# Patient Record
Sex: FEMALE | Race: WHITE | NOT HISPANIC OR LATINO | Employment: OTHER | URBAN - NONMETROPOLITAN AREA
[De-identification: names, ages, dates, MRNs, and addresses within clinical notes are randomized per-mention and may not be internally consistent; named-entity substitution may affect disease eponyms.]

---

## 2017-01-09 ENCOUNTER — OFFICE VISIT (OUTPATIENT)
Dept: INTERNAL MEDICINE | Facility: CLINIC | Age: 67
End: 2017-01-09

## 2017-01-09 VITALS
DIASTOLIC BLOOD PRESSURE: 68 MMHG | BODY MASS INDEX: 32.18 KG/M2 | SYSTOLIC BLOOD PRESSURE: 160 MMHG | TEMPERATURE: 97.6 F | HEART RATE: 52 BPM | HEIGHT: 67 IN | WEIGHT: 205 LBS | OXYGEN SATURATION: 98 %

## 2017-01-09 DIAGNOSIS — F41.9 ANXIETY: ICD-10-CM

## 2017-01-09 DIAGNOSIS — I10 ESSENTIAL HYPERTENSION: Primary | ICD-10-CM

## 2017-01-09 DIAGNOSIS — E78.5 HYPERLIPIDEMIA, UNSPECIFIED HYPERLIPIDEMIA TYPE: ICD-10-CM

## 2017-01-09 DIAGNOSIS — I48.0 PAROXYSMAL ATRIAL FIBRILLATION (HCC): ICD-10-CM

## 2017-01-09 DIAGNOSIS — F01.50 VASCULAR DEMENTIA WITHOUT BEHAVIORAL DISTURBANCE (HCC): ICD-10-CM

## 2017-01-09 DIAGNOSIS — R05.9 COUGH: ICD-10-CM

## 2017-01-09 PROCEDURE — 99213 OFFICE O/P EST LOW 20 MIN: CPT | Performed by: INTERNAL MEDICINE

## 2017-01-09 PROCEDURE — G0009 ADMIN PNEUMOCOCCAL VACCINE: HCPCS | Performed by: INTERNAL MEDICINE

## 2017-01-09 PROCEDURE — 90670 PCV13 VACCINE IM: CPT | Performed by: INTERNAL MEDICINE

## 2017-01-09 RX ORDER — AMLODIPINE BESYLATE 5 MG/1
5 TABLET ORAL DAILY
Qty: 30 TABLET | Refills: 6 | Status: SHIPPED | OUTPATIENT
Start: 2017-01-09 | End: 2017-01-10

## 2017-01-09 NOTE — MR AVS SNAPSHOT
Nery Garcia   1/9/2017 8:45 AM   Office Visit    Provider:  Marilyn K Vermeesch, MD   Department:  [facility] PRIMARY CARE   Dept Phone:  [phone]                Your Full Care Plan              Today's Medication Changes          These changes are accurate as of: 1/9/17  9:21 AM.  If you have any questions, ask your nurse or doctor.               New Medication(s)Ordered:     amLODIPine 5 MG tablet   Commonly known as:  NORVASC   Take 1 tablet by mouth Daily.            Where to Get Your Medications      These medications were sent to [facility] [address] [place] [address] - [address] [place] [address] - [phone]  - [phone]   [address] [place] [id number]     Phone:  [phone]     amLODIPine 5 MG tablet                  Your Updated Medication List          This list is accurate as of: 1/9/17  9:21 AM.  Always use your most recent med list.                Acidophilus wafer       amLODIPine 5 MG tablet   Commonly known as:  NORVASC   Take 1 tablet by mouth Daily.       ANORO ELLIPTA 62.5-25 MCG/INH aerosol powder    Generic drug:  Umeclidinium-Vilanterol       aspirin 81 MG tablet       atorvastatin 40 MG tablet   Commonly known as:  LIPITOR   Take 1 tablet by mouth Daily.       carbidopa-levodopa  MG per tablet   Commonly known as:  SINEMET       donepezil 10 MG tablet   Commonly known as:  ARICEPT   Take 1 tablet by mouth every night.       escitalopram 20 MG tablet   Commonly known as:  LEXAPRO       FIBER PO       fluticasone 50 MCG/ACT nasal spray   Commonly known as:  FLONASE       losartan 100 MG tablet   Commonly known as:  COZAAR   Take 1 tablet by mouth Daily.       montelukast 10 MG tablet   Commonly known as:  SINGULAIR   Take 1 tablet by mouth Daily.       MULTI-DAY VITAMINS tablet       OSCAL 500/200 D-3 500-200 MG-UNIT per tablet   Generic drug:  calcium-vitamin D       pantoprazole 40 MG EC tablet   Commonly known as:  PROTONIX    TAKE ONE TABLET BY MOUTH IN THE MORNING 1/2  HOUR  BEFORE  BREAKFAST       PRADAXA 150 MG capsu   Generic drug:  dabigatran etexilate       sotalol 80 MG tablet   Commonly known as:  BETAPACE   Take 1 tablet by mouth 2 (Two) Times a Day.       vitamin B-12 1000 MCG tablet   Commonly known as:  CYANOCOBALAMIN       Vitamin E 400 UNITS tablet               You Were Diagnosed With        Codes Comments    Essential hypertension    -  Primary ICD-10-CM: I10  ICD-9-CM: 401.9     Hyperlipidemia, unspecified hyperlipidemia type     ICD-10-CM: E78.5  ICD-9-CM: 272.4     Vascular dementia without behavioral disturbance     ICD-10-CM: F01.50  ICD-9-CM: 290.40     Anxiety     ICD-10-CM: F41.9  ICD-9-CM: 300.00     Paroxysmal atrial fibrillation     ICD-10-CM: I48.0  ICD-9-CM: 427.31     Cough     ICD-10-CM: R05  ICD-9-CM: 786.2       Instructions     None    Patient Instructions History      [employer] Signup     Our records indicate that you have an active [employer] account.    You can view your After Visit Summary by going to [employer] and logging in with your [employer] username and password.  If you don't have a [employer] username and password but a parent or guardian has access to your record, the parent or guardian should login with their own [employer] username and password and access your record to view the After Visit Summary.    If you have questions, you can email [email or web address] or call [phone] to talk to our [employer] staff.  Remember, [employer] is NOT to be used for urgent needs.  For medical emergencies, dial 911.               Other Info from Your Visit           Your Appointments     Sep 20, 2017 11:45 AM EDT   Follow Up with Mega Kendall MD   [facility] (--)    [address] [place] [id number] [phone]           Arrive 15 minutes prior to appointment.              Allergies     Lisinopril  Cough      Reason for  "Visit     Follow-up 1 month for HTN, GERD, Dementia, and Depression. Pt states she's been coughing up dark green mucus X 1 week, states she had one day where she lost her voice.       Vital Signs     Blood Pressure Pulse Temperature Height Weight Oxygen Saturation    160/68 52 97.6 °F (36.4 °C) 67\" (170.2 cm) 205 lb (93 kg) 98%    Body Mass Index Smoking Status                32.11 kg/m2 Former Smoker          Problems and Diagnoses Noted     Anxiety problem    Cough    Dementia    High cholesterol or triglycerides    High blood pressure    Atrial fibrillation (irregular heartbeat)      No Longer an Issue     Cognitive disorder    Dyslipidemia    Encounter for screening mammogram for breast cancer    Heartburn    Malignant essential hypertension    Periodic limb movement disorder      "

## 2017-01-09 NOTE — PROGRESS NOTES
"Chief Complaint   Patient presents with   • Follow-up     1 month for HTN, GERD, Dementia, and Depression. Pt states she's been coughing up dark green mucus X 1 week, states she had one day where she lost her voice.      Subjective   Novel Azael Garcia is a 66 y.o. female.     HPI Comments: PMH of HTN, HLD, parox. Afib, depression, PAT, dementia, history of PE, fatty liver disease, COPD and GERD.   GERD is well controlled. No abdominal pain, dark stools or blood in stool.   She is now enrolled in a study at  for dementia.  She remains on aricept and Sinemet.  She is on Lexapro for depression symptoms.  She has had a recent cough with dark green mucus for the past week.  Denies SOB or wheezing. She is on oxygen at night for PAT.  BP is elevated, home reads are running 152-177/56-69.  No CP, SOB or edema.   No EA, ST, fever.  Always has PND.           The following portions of the patient's history were reviewed and updated as appropriate: allergies, current medications, past family history, past medical history, past social history, past surgical history and problem list.    Review of Systems   Constitutional: Negative for chills and fever.   HENT: Positive for congestion and postnasal drip. Negative for ear pain and sore throat.    Respiratory: Positive for cough. Negative for shortness of breath.    Cardiovascular: Negative for chest pain and palpitations.   Psychiatric/Behavioral: Positive for confusion and dysphoric mood. The patient is not nervous/anxious.    All other systems reviewed and are negative.      Objective   Visit Vitals   • /68   • Pulse 52   • Temp 97.6 °F (36.4 °C)   • Ht 67\" (170.2 cm)   • Wt 205 lb (93 kg)   • SpO2 98%   • BMI 32.11 kg/m2     Body mass index is 32.11 kg/(m^2).  Physical Exam   Constitutional: She appears well-developed and well-nourished.   HENT:   Head: Normocephalic and atraumatic.   Right Ear: External ear normal.   Left Ear: External ear normal.   Mouth/Throat: " Oropharynx is clear and moist.   Eyes: Conjunctivae and EOM are normal. Pupils are equal, round, and reactive to light.   Neck: Normal range of motion. Neck supple. No thyromegaly present.   Cardiovascular: Normal rate, regular rhythm, normal heart sounds and intact distal pulses.    No murmur heard.  Pulmonary/Chest: Effort normal and breath sounds normal. No respiratory distress. She has no wheezes.   Abdominal: Soft. Bowel sounds are normal.   Musculoskeletal: She exhibits no edema.   Lymphadenopathy:     She has no cervical adenopathy.   Neurological: She is alert. No cranial nerve deficit.   Psychiatric: She has a normal mood and affect. Her behavior is normal. Judgment normal.   Nursing note and vitals reviewed.      Assessment/Plan   Nery Garcia is here today and the following problems have been addressed:      Nery was seen today for follow-up.    Diagnoses and all orders for this visit:    Essential hypertension    Hyperlipidemia, unspecified hyperlipidemia type    Vascular dementia without behavioral disturbance    Anxiety    Paroxysmal atrial fibrillation    Cough    Other orders  -     amLODIPine (NORVASC) 5 MG tablet; Take 1 tablet by mouth Daily.    She is uncertain if taking amlodipine, will call us and let us know, sent in new script for 5 mg tabs  Check BP regularly  Recommend mucinex once a day  Increase fluids and rest  No other med changes  Labs are UTD  Mammogram is UTD    RTC 2 mo for BP followup

## 2017-01-10 ENCOUNTER — TELEPHONE (OUTPATIENT)
Dept: INTERNAL MEDICINE | Facility: CLINIC | Age: 67
End: 2017-01-10

## 2017-01-10 RX ORDER — AMLODIPINE BESYLATE 10 MG/1
10 TABLET ORAL DAILY
Qty: 30 TABLET | Refills: 5 | Status: SHIPPED | OUTPATIENT
Start: 2017-01-10 | End: 2017-03-09 | Stop reason: SDUPTHER

## 2017-01-16 RX ORDER — ESCITALOPRAM OXALATE 10 MG/1
TABLET ORAL
Qty: 30 TABLET | Refills: 0 | Status: SHIPPED | OUTPATIENT
Start: 2017-01-16 | End: 2017-02-18 | Stop reason: SDUPTHER

## 2017-02-20 RX ORDER — ESCITALOPRAM OXALATE 10 MG/1
TABLET ORAL
Qty: 30 TABLET | Refills: 0 | Status: SHIPPED | OUTPATIENT
Start: 2017-02-20 | End: 2017-03-09

## 2017-03-09 ENCOUNTER — OFFICE VISIT (OUTPATIENT)
Dept: INTERNAL MEDICINE | Facility: CLINIC | Age: 67
End: 2017-03-09

## 2017-03-09 VITALS
WEIGHT: 208 LBS | OXYGEN SATURATION: 98 % | TEMPERATURE: 98 F | BODY MASS INDEX: 32.65 KG/M2 | HEART RATE: 53 BPM | SYSTOLIC BLOOD PRESSURE: 120 MMHG | HEIGHT: 67 IN | DIASTOLIC BLOOD PRESSURE: 66 MMHG

## 2017-03-09 DIAGNOSIS — I10 ESSENTIAL HYPERTENSION: ICD-10-CM

## 2017-03-09 DIAGNOSIS — R68.89 COLD FEELING: ICD-10-CM

## 2017-03-09 DIAGNOSIS — Z11.59 NEED FOR HEPATITIS C SCREENING TEST: Primary | ICD-10-CM

## 2017-03-09 PROBLEM — R05.9 COUGH: Status: RESOLVED | Noted: 2017-01-09 | Resolved: 2017-03-09

## 2017-03-09 LAB
BASOPHILS # BLD AUTO: 0.02 10*3/MM3 (ref 0–0.2)
BASOPHILS NFR BLD AUTO: 0.4 % (ref 0–1)
DEPRECATED RDW RBC AUTO: 44.7 FL (ref 37–54)
EOSINOPHIL # BLD AUTO: 0.21 10*3/MM3 (ref 0.1–0.3)
EOSINOPHIL NFR BLD AUTO: 3.8 % (ref 0–3)
ERYTHROCYTE [DISTWIDTH] IN BLOOD BY AUTOMATED COUNT: 12.9 % (ref 11.3–14.5)
HCT VFR BLD AUTO: 39.4 % (ref 34.5–44)
HGB BLD-MCNC: 12.7 G/DL (ref 11.5–15.5)
IMM GRANULOCYTES # BLD: 0 10*3/MM3 (ref 0–0.03)
IMM GRANULOCYTES NFR BLD: 0 % (ref 0–0.6)
LYMPHOCYTES # BLD AUTO: 2.25 10*3/MM3 (ref 0.6–4.8)
LYMPHOCYTES NFR BLD AUTO: 40.5 % (ref 24–44)
MCH RBC QN AUTO: 30.4 PG (ref 27–31)
MCHC RBC AUTO-ENTMCNC: 32.2 G/DL (ref 32–36)
MCV RBC AUTO: 94.3 FL (ref 80–99)
MONOCYTES # BLD AUTO: 0.36 10*3/MM3 (ref 0–1)
MONOCYTES NFR BLD AUTO: 6.5 % (ref 0–12)
NEUTROPHILS # BLD AUTO: 2.71 10*3/MM3 (ref 1.5–8.3)
NEUTROPHILS NFR BLD AUTO: 48.8 % (ref 41–71)
PLATELET # BLD AUTO: 159 10*3/MM3 (ref 150–450)
PMV BLD AUTO: 10.7 FL (ref 6–12)
RBC # BLD AUTO: 4.18 10*6/MM3 (ref 3.89–5.14)
WBC NRBC COR # BLD: 5.55 10*3/MM3 (ref 3.5–10.8)

## 2017-03-09 PROCEDURE — 85025 COMPLETE CBC W/AUTO DIFF WBC: CPT | Performed by: INTERNAL MEDICINE

## 2017-03-09 PROCEDURE — 36415 COLL VENOUS BLD VENIPUNCTURE: CPT | Performed by: INTERNAL MEDICINE

## 2017-03-09 PROCEDURE — 99213 OFFICE O/P EST LOW 20 MIN: CPT | Performed by: INTERNAL MEDICINE

## 2017-03-09 RX ORDER — AMLODIPINE BESYLATE 10 MG/1
10 TABLET ORAL DAILY
Qty: 90 TABLET | Refills: 1 | Status: SHIPPED | OUTPATIENT
Start: 2017-03-09 | End: 2017-06-09 | Stop reason: SDUPTHER

## 2017-03-09 RX ORDER — PANTOPRAZOLE SODIUM 40 MG/1
40 TABLET, DELAYED RELEASE ORAL DAILY
Qty: 90 TABLET | Refills: 1 | Status: SHIPPED | OUTPATIENT
Start: 2017-03-09 | End: 2017-06-09 | Stop reason: SDUPTHER

## 2017-03-09 RX ORDER — SOTALOL HYDROCHLORIDE 80 MG/1
80 TABLET ORAL 2 TIMES DAILY
Qty: 180 TABLET | Refills: 1 | Status: SHIPPED | OUTPATIENT
Start: 2017-03-09 | End: 2017-06-09 | Stop reason: SDUPTHER

## 2017-03-09 RX ORDER — LOSARTAN POTASSIUM 100 MG/1
100 TABLET ORAL DAILY
Qty: 90 TABLET | Refills: 1 | Status: SHIPPED | OUTPATIENT
Start: 2017-03-09 | End: 2017-06-09 | Stop reason: SDUPTHER

## 2017-03-09 RX ORDER — MONTELUKAST SODIUM 10 MG/1
10 TABLET ORAL DAILY
Qty: 90 TABLET | Refills: 1 | Status: SHIPPED | OUTPATIENT
Start: 2017-03-09 | End: 2017-06-09 | Stop reason: SDUPTHER

## 2017-03-09 NOTE — PROGRESS NOTES
"Chief Complaint   Patient presents with   • Follow-up     2 months for BP. Pt states BP seems to be doing better. Also states she is cold all the time.      Subjective   Novel Azael Garcia is a 66 y.o. female.     HPI Comments: Here for follow up of HTN.  BP is well controlled today.  No CP, but she has chronic SOB.  Has some edema at end of day.   She is on several meds for her BP.  She has not been checking it often, but it is usually good when she does.   Is complaining that she is cold when every one else is warm.   All labs were normal 3 months ago.   She does have loose stools and did have one episode of incontinence.   Last night she developed pain over her left upper chest wall.  It hurts with palpation.  She does not notice a nodule.         The following portions of the patient's history were reviewed and updated as appropriate: allergies, current medications, past family history, past medical history, past social history, past surgical history and problem list.    Review of Systems   Respiratory: Positive for shortness of breath.    Cardiovascular: Positive for leg swelling.   Gastrointestinal:        Loose stools but not frequent   Musculoskeletal:        Right upper chest wall pain       Objective   Visit Vitals   • /66   • Pulse 53   • Temp 98 °F (36.7 °C)   • Ht 67\" (170.2 cm)   • Wt 208 lb (94.3 kg)   • SpO2 98%   • BMI 32.58 kg/m2     Body mass index is 32.58 kg/(m^2).  Physical Exam   Constitutional: She is oriented to person, place, and time. She appears well-developed and well-nourished.   HENT:   Head: Normocephalic and atraumatic.   Mouth/Throat: Oropharynx is clear and moist.   Eyes: Conjunctivae and EOM are normal. Pupils are equal, round, and reactive to light.   Neck: Normal range of motion. Neck supple. No thyromegaly present.   Cardiovascular: Normal rate, regular rhythm, normal heart sounds and intact distal pulses.    No murmur heard.  Pulmonary/Chest: Effort normal and breath " sounds normal. No respiratory distress.   Musculoskeletal:   +1 edema at ankles  Right upper chest wall with no palpable mass, some pain to palpation over lateral second and third ribs   Lymphadenopathy:     She has no cervical adenopathy.   Neurological: She is alert and oriented to person, place, and time. No cranial nerve deficit.   Psychiatric: She has a normal mood and affect. Judgment normal.   Nursing note and vitals reviewed.      Assessment/Plan   Nery Garcia is here today and the following problems have been addressed:      Nery was seen today for follow-up.    Diagnoses and all orders for this visit:    Need for hepatitis C screening test    Essential hypertension  -     CBC & Differential; Future    Cold feeling  -     CBC & Differential; Future    Other orders  -     sotalol (BETAPACE) 80 MG tablet; Take 1 tablet by mouth 2 (Two) Times a Day.  -     pantoprazole (PROTONIX) 40 MG EC tablet; Take 1 tablet by mouth Daily.  -     montelukast (SINGULAIR) 10 MG tablet; Take 1 tablet by mouth Daily.  -     losartan (COZAAR) 100 MG tablet; Take 1 tablet by mouth Daily.  -     amLODIPine (NORVASC) 10 MG tablet; Take 1 tablet by mouth Daily.    lab as noted  No med changes  Recommend knee high compression stockings for edema due to amlodipine use  Loose stool likely from aricept  Recommend heat to chest wall and tylenol to area  If chest wall sxs worsen, RTC    RTc 3 mo    Please note that portions of this note were completed with a voice recognition program.  Efforts were made to edit dictation, but occasionally words are mistranscribed.

## 2017-03-10 ENCOUNTER — TELEPHONE (OUTPATIENT)
Dept: INTERNAL MEDICINE | Facility: CLINIC | Age: 67
End: 2017-03-10

## 2017-03-10 NOTE — TELEPHONE ENCOUNTER
----- Message from Marilyn K Vermeesch, MD sent at 3/9/2017  6:00 PM EST -----  Please tell patient or her daughter that CBC is completely normal.

## 2017-03-31 ENCOUNTER — TELEPHONE (OUTPATIENT)
Dept: INTERNAL MEDICINE | Facility: CLINIC | Age: 67
End: 2017-03-31

## 2017-03-31 NOTE — TELEPHONE ENCOUNTER
----- Message from Susan Stoner sent at 3/31/2017  4:03 PM EDT -----  Contact: Patient   Patient called the office stating that she got a letter from her insurance that she is going to have to switch from pradaxa and starts taking another blood thinner. She stated that dr Garcia had been giving her samples of elliquis and she has ran out of the samples. She has tried to get in contact with their office but so far they have not sent anything in. She wants to see if Dr Vermeesch could send her a Rx to [facility] pharmacy here in [place] before the weekend. She is very nervous about going the weekend without it and would like a contact back at [phone] when you get a chance.

## 2017-04-05 ENCOUNTER — TELEPHONE (OUTPATIENT)
Dept: INTERNAL MEDICINE | Facility: CLINIC | Age: 67
End: 2017-04-05

## 2017-04-05 NOTE — TELEPHONE ENCOUNTER
Pharmacy called stating Pt's Eliquis is going to cost her over $300. Dr. Kendall's office is currently out on spring break and the Pt is out of medication. Pharmacy would like to know if they can just fill her Pradaxa.

## 2017-04-11 ENCOUNTER — OFFICE VISIT (OUTPATIENT)
Dept: INTERNAL MEDICINE | Facility: CLINIC | Age: 67
End: 2017-04-11

## 2017-04-11 VITALS
HEART RATE: 57 BPM | BODY MASS INDEX: 32.55 KG/M2 | WEIGHT: 207.38 LBS | HEIGHT: 67 IN | OXYGEN SATURATION: 98 % | SYSTOLIC BLOOD PRESSURE: 120 MMHG | DIASTOLIC BLOOD PRESSURE: 62 MMHG | TEMPERATURE: 97.4 F

## 2017-04-11 DIAGNOSIS — H66.001 ACUTE SUPPURATIVE OTITIS MEDIA OF RIGHT EAR WITHOUT SPONTANEOUS RUPTURE OF TYMPANIC MEMBRANE, RECURRENCE NOT SPECIFIED: Primary | ICD-10-CM

## 2017-04-11 PROBLEM — R68.89 COLD FEELING: Status: RESOLVED | Noted: 2017-03-09 | Resolved: 2017-04-11

## 2017-04-11 PROCEDURE — 99213 OFFICE O/P EST LOW 20 MIN: CPT | Performed by: INTERNAL MEDICINE

## 2017-04-11 RX ORDER — AMOXICILLIN 875 MG/1
875 TABLET, COATED ORAL 2 TIMES DAILY
Qty: 20 TABLET | Refills: 0 | Status: SHIPPED | OUTPATIENT
Start: 2017-04-11 | End: 2017-06-09

## 2017-04-11 NOTE — PROGRESS NOTES
"Chief Complaint   Patient presents with   • Cough     right ear draining, SOA, chest congestion, and brownish-green mucus X 3 days now.      Subjective   Novel Azael Garcia is a 66 y.o. female.     HPI Comments: She is on a study for her dementia and is currently inhaling insulin in nose, thru [facility] study.     Cough   This is a new problem. The current episode started in the past 7 days. The problem has been waxing and waning. The problem occurs every few hours. The cough is productive of sputum (green /brown mucous). Associated symptoms include ear congestion, nasal congestion, rhinorrhea and shortness of breath. Pertinent negatives include no chills, ear pain, fever, headaches, hemoptysis, postnasal drip, sore throat, sweats or wheezing. Associated symptoms comments: Poor energy  Right ear fullness and drainage  Head feels full. The symptoms are aggravated by pollens. She has tried nothing for the symptoms. Her past medical history is significant for COPD.        The following portions of the patient's history were reviewed and updated as appropriate: allergies, current medications, past family history, past medical history, past social history, past surgical history and problem list.    Review of Systems   Constitutional: Negative for chills and fever.   HENT: Positive for rhinorrhea. Negative for ear pain, postnasal drip and sore throat.    Respiratory: Positive for cough and shortness of breath. Negative for hemoptysis and wheezing.    Neurological: Negative for headaches.       Objective   /62  Pulse 57  Temp 97.4 °F (36.3 °C)  Ht 67\" (170.2 cm)  Wt 207 lb 6 oz (94.1 kg)  SpO2 98%  BMI 32.48 kg/m2  Body mass index is 32.48 kg/(m^2).  Physical Exam   Constitutional: She is oriented to person, place, and time. She appears well-developed and well-nourished.   HENT:   Head: Normocephalic and atraumatic.   Mouth/Throat: Oropharynx is clear and moist.   Left tympanic membrane is normal, right tympanic " membrane is ruptured and erythematous with small amount of clear drainage, turbinates are slightly red with clear rhinorrhea, clear postnasal drip noted, right sinus tenderness over maxillary area   Eyes: Conjunctivae and EOM are normal. Pupils are equal, round, and reactive to light.   Neck: Normal range of motion. Neck supple. No thyromegaly present.   Cardiovascular: Normal rate, regular rhythm and normal heart sounds.    No murmur heard.  Pulmonary/Chest: Effort normal and breath sounds normal. No respiratory distress. She has no wheezes. She has no rales.   Lymphadenopathy:     She has no cervical adenopathy.   Neurological: She is alert and oriented to person, place, and time. No cranial nerve deficit.   Psychiatric: She has a normal mood and affect. Judgment normal.   Nursing note and vitals reviewed.      Assessment/Plan   Nery Garcia is here today and the following problems have been addressed:      Nery was seen today for cough.    Diagnoses and all orders for this visit:    Acute suppurative otitis media of right ear without spontaneous rupture of tympanic membrane, recurrence not specified    Other orders  -     amoxicillin (AMOXIL) 875 MG tablet; Take 1 tablet by mouth 2 (Two) Times a Day.    Given amoxil 875 mg BID  Recommend tylenol prn for pain  May apply heat to ear prn  If needed, use plain mucinex expectorant    RTC prn    Please note that portions of this note were completed with a voice recognition program.  Efforts were made to edit dictation, but occasionally words are mistranscribed.

## 2017-04-26 ENCOUNTER — OFFICE VISIT (OUTPATIENT)
Dept: INTERNAL MEDICINE | Facility: CLINIC | Age: 67
End: 2017-04-26

## 2017-04-26 ENCOUNTER — TELEPHONE (OUTPATIENT)
Dept: INTERNAL MEDICINE | Facility: CLINIC | Age: 67
End: 2017-04-26

## 2017-04-26 VITALS
BODY MASS INDEX: 32.8 KG/M2 | OXYGEN SATURATION: 97 % | DIASTOLIC BLOOD PRESSURE: 62 MMHG | SYSTOLIC BLOOD PRESSURE: 132 MMHG | TEMPERATURE: 97.1 F | HEART RATE: 89 BPM | WEIGHT: 209 LBS | HEIGHT: 67 IN

## 2017-04-26 DIAGNOSIS — R19.7 DIARRHEA OF PRESUMED INFECTIOUS ORIGIN: Primary | ICD-10-CM

## 2017-04-26 DIAGNOSIS — R53.82 CHRONIC FATIGUE: ICD-10-CM

## 2017-04-26 LAB
ALBUMIN SERPL-MCNC: 4.5 G/DL (ref 3.5–5)
ALBUMIN/GLOB SERPL: 1.7 G/DL (ref 1–2)
ALP SERPL-CCNC: 74 U/L (ref 38–126)
ALT SERPL-CCNC: 33 U/L (ref 13–69)
AST SERPL-CCNC: 27 U/L (ref 15–46)
BASOPHILS # BLD AUTO: 0.06 10*3/MM3 (ref 0–0.2)
BASOPHILS NFR BLD AUTO: 1.1 % (ref 0–2.5)
BILIRUB SERPL-MCNC: 0.8 MG/DL (ref 0.2–1.3)
BUN SERPL-MCNC: 14 MG/DL (ref 7–20)
BUN/CREAT SERPL: 11.7 (ref 7.1–23.5)
CALCIUM SERPL-MCNC: 9.8 MG/DL (ref 8.4–10.2)
CHLORIDE SERPL-SCNC: 103 MMOL/L (ref 98–107)
CO2 SERPL-SCNC: 28 MMOL/L (ref 26–30)
CREAT SERPL-MCNC: 1.2 MG/DL (ref 0.6–1.3)
EOSINOPHIL # BLD AUTO: 0.19 10*3/MM3 (ref 0–0.7)
EOSINOPHIL NFR BLD AUTO: 3.4 % (ref 0–7)
ERYTHROCYTE [DISTWIDTH] IN BLOOD BY AUTOMATED COUNT: 12.3 % (ref 11.5–14.5)
GLOBULIN SER CALC-MCNC: 2.6 GM/DL
GLUCOSE SERPL-MCNC: 94 MG/DL (ref 74–98)
HCT VFR BLD AUTO: 40.2 % (ref 37–47)
HGB BLD-MCNC: 13.1 G/DL (ref 12–16)
IMM GRANULOCYTES # BLD: 0.01 10*3/MM3 (ref 0–0.06)
IMM GRANULOCYTES NFR BLD: 0.2 % (ref 0–0.6)
LYMPHOCYTES # BLD AUTO: 2.19 10*3/MM3 (ref 0.6–3.4)
LYMPHOCYTES NFR BLD AUTO: 39 % (ref 10–50)
MCH RBC QN AUTO: 30.8 PG (ref 27–31)
MCHC RBC AUTO-ENTMCNC: 32.6 G/DL (ref 30–37)
MCV RBC AUTO: 94.4 FL (ref 81–99)
MONOCYTES # BLD AUTO: 0.41 10*3/MM3 (ref 0–0.9)
MONOCYTES NFR BLD AUTO: 7.3 % (ref 0–12)
NEUTROPHILS # BLD AUTO: 2.76 10*3/MM3 (ref 2–6.9)
NEUTROPHILS NFR BLD AUTO: 49 % (ref 37–80)
NRBC BLD AUTO-RTO: 0 /100 WBC (ref 0–0)
PLATELET # BLD AUTO: 175 10*3/MM3 (ref 130–400)
POTASSIUM SERPL-SCNC: 4.6 MMOL/L (ref 3.5–5.1)
PROT SERPL-MCNC: 7.1 G/DL (ref 6.3–8.2)
RBC # BLD AUTO: 4.26 10*6/MM3 (ref 4.2–5.4)
SODIUM SERPL-SCNC: 142 MMOL/L (ref 137–145)
WBC # BLD AUTO: 5.62 10*3/MM3 (ref 4.8–10.8)

## 2017-04-26 PROCEDURE — 99214 OFFICE O/P EST MOD 30 MIN: CPT | Performed by: FAMILY MEDICINE

## 2017-04-26 RX ORDER — BLACK COHOSH ROOT EXTRACT 80 MG
1 CAPSULE ORAL 3 TIMES DAILY
Qty: 30 CAPSULE | Refills: 0 | Status: SHIPPED | OUTPATIENT
Start: 2017-04-26 | End: 2018-08-29

## 2017-04-26 NOTE — TELEPHONE ENCOUNTER
----- Message from Reva Gilmore sent at 4/26/2017 10:26 AM EDT -----  Contact: Patient  Patient said for the past month after she eats she gets diarrhea.  She wants to know if there is anything that can be called in for her for this problem.    She'd like a callback at:  [phone]

## 2017-04-26 NOTE — PROGRESS NOTES
"Subjective   Novel Azael Garcia is a 66 y.o. female.     History of Present Illness   Having extreme fatigue. Has also been having extreme diarrhea. Runny dark BMs, stringy, after eating, urgency to go, has had some BMs on herself. Stools are dark brown. No blood. No black. No abdominal pain. Has been having diarrhea for a week, not improving. Feels so tired. She has sleep apnea per chart (she's not sure when I ask), wears \"oxygen\" from a big machine. Has seen Dr. Ramirez in the past but it's been a long time. Settings have not been changed on machine since she started using it.  Plans to go to [place] this weekend and wants to make sure she's good to go. Has city water. Was on antibiotics earlier this month (amoxicillin). Takes Protonix.    The following portions of the patient's history were reviewed and updated as appropriate: allergies, current medications, past family history, past medical history, past social history, past surgical history and problem list.    Review of Systems   Constitutional: Positive for activity change. Negative for fever.   Gastrointestinal: Negative for blood in stool.       Objective   Physical Exam   Constitutional: Vital signs are normal. She appears well-developed and well-nourished. She is active. She does not appear ill.   HENT:   Head: Normocephalic and atraumatic.   Right Ear: Hearing normal.   Left Ear: Hearing normal.   Eyes: EOM are normal. Pupils are equal, round, and reactive to light.   Neck: Neck supple.   Cardiovascular: Normal rate, regular rhythm and normal heart sounds.    Pulmonary/Chest: Effort normal and breath sounds normal.   Abdominal: Soft. Bowel sounds are normal. She exhibits no mass. There is no tenderness. There is no rigidity, no rebound and no guarding.   Exam delayed due to phone ringing after patient on exam table, she got down and answered and carried on a conversation with granddaughter for few minutes.   Neurological: She is alert. Gait normal. "   Skin: Skin is warm. She is not diaphoretic. No cyanosis. No pallor.   Psychiatric: She has a normal mood and affect. Her speech is normal and behavior is normal. Judgment normal.   Nursing note and vitals reviewed.    Lab Results   Component Value Date    TSH 3.604 12/07/2016     CMP 12/7/16 CO2 34.0  eGFR 72, Cr 0.8  CBC 3/9/17 not worrisome    Assessment/Plan   Novel was seen today for diarrhea and fatigue.    Diagnoses and all orders for this visit:    Diarrhea of presumed infectious origin  -     Fecal Leukocytes  -     Occult Blood X 1, Stool  -     Stool Culture  -     Clostridium Difficile EIA  -     Lactobacillus (ACIDOPHILUS PROBIOTIC) 100 MG capsule; Take 1 capsule by mouth 3 (Three) Times a Day.    Chronic fatigue  -     CBC & Differential  -     Comprehensive Metabolic Panel    Encouraged hydration, testing for infectious origin, discouraged use of pepto/immodium in case it is infectious. Needs to follow up with PCP. May have diarrhea associated with PPI use (~9% adverse event rate). Must rule out C diff. Encouraged CPAP compliance, needs to follow up with Dr. Ramirez to see if settings need changed.

## 2017-05-26 ENCOUNTER — TELEPHONE (OUTPATIENT)
Dept: INTERNAL MEDICINE | Facility: CLINIC | Age: 67
End: 2017-05-26

## 2017-06-09 ENCOUNTER — OFFICE VISIT (OUTPATIENT)
Dept: INTERNAL MEDICINE | Facility: CLINIC | Age: 67
End: 2017-06-09

## 2017-06-09 VITALS
HEIGHT: 67 IN | SYSTOLIC BLOOD PRESSURE: 116 MMHG | OXYGEN SATURATION: 96 % | BODY MASS INDEX: 32.57 KG/M2 | HEART RATE: 70 BPM | DIASTOLIC BLOOD PRESSURE: 58 MMHG | TEMPERATURE: 97.7 F | WEIGHT: 207.5 LBS

## 2017-06-09 DIAGNOSIS — E78.2 MIXED HYPERLIPIDEMIA: ICD-10-CM

## 2017-06-09 DIAGNOSIS — F32.89 OTHER DEPRESSION: ICD-10-CM

## 2017-06-09 DIAGNOSIS — K21.9 GASTROESOPHAGEAL REFLUX DISEASE, ESOPHAGITIS PRESENCE NOT SPECIFIED: ICD-10-CM

## 2017-06-09 DIAGNOSIS — I10 ESSENTIAL HYPERTENSION: Primary | ICD-10-CM

## 2017-06-09 DIAGNOSIS — F03.90 DEMENTIA WITHOUT BEHAVIORAL DISTURBANCE, UNSPECIFIED DEMENTIA TYPE: ICD-10-CM

## 2017-06-09 DIAGNOSIS — J43.8 OTHER EMPHYSEMA (HCC): ICD-10-CM

## 2017-06-09 PROCEDURE — 99214 OFFICE O/P EST MOD 30 MIN: CPT | Performed by: INTERNAL MEDICINE

## 2017-06-09 RX ORDER — PANTOPRAZOLE SODIUM 40 MG/1
40 TABLET, DELAYED RELEASE ORAL DAILY
Qty: 90 TABLET | Refills: 3 | Status: SHIPPED | OUTPATIENT
Start: 2017-06-09 | End: 2018-04-06 | Stop reason: SDUPTHER

## 2017-06-09 RX ORDER — MONTELUKAST SODIUM 10 MG/1
10 TABLET ORAL DAILY
Qty: 90 TABLET | Refills: 3 | Status: SHIPPED | OUTPATIENT
Start: 2017-06-09 | End: 2018-04-06 | Stop reason: SDUPTHER

## 2017-06-09 RX ORDER — LOSARTAN POTASSIUM 100 MG/1
100 TABLET ORAL DAILY
Qty: 90 TABLET | Refills: 1 | Status: SHIPPED | OUTPATIENT
Start: 2017-06-09 | End: 2017-12-06 | Stop reason: SDUPTHER

## 2017-06-09 RX ORDER — SOTALOL HYDROCHLORIDE 80 MG/1
80 TABLET ORAL 2 TIMES DAILY
Qty: 180 TABLET | Refills: 1 | Status: SHIPPED | OUTPATIENT
Start: 2017-06-09 | End: 2017-10-04 | Stop reason: SDUPTHER

## 2017-06-09 RX ORDER — AMLODIPINE BESYLATE 10 MG/1
10 TABLET ORAL DAILY
Qty: 90 TABLET | Refills: 1 | Status: SHIPPED | OUTPATIENT
Start: 2017-06-09 | End: 2017-12-06 | Stop reason: SDUPTHER

## 2017-06-09 NOTE — PROGRESS NOTES
"Chief Complaint   Patient presents with   • Follow-up     3 months for anxiety, depression, HTN, HLD, and GERD. No new complaints.      Subjective   Novel Azael Garcia is a 66 y.o. female.     HPI Comments: PMH of HTN, HLD, parox. Afib, depression, PAT, dementia, history of PE, fatty liver disease, COPD and GERD.   GERD is well controlled. No abdominal pain, dark stools or blood in stool.   She is enrolled in a study at  for dementia. She remains on aricept and Sinemet.   She is on Lexapro for depression symptoms.  She denies any sxs.   Denies SOB or wheezing. She is on oxygen at night for PAT.  No CP, SOB or edema. No palpitations.  No recent visits to cardiologist.   Mammogram is UTD.   She does not follow a specific diet for her cholesterol.  She does not exercise.   She does push mow her yard.  Denies any chest pain or shortness of breath during mowing.  She does admit to missing her medications in the evening frequently and occasionally in the morning.         The following portions of the patient's history were reviewed and updated as appropriate: allergies, current medications, past family history, past medical history, past social history, past surgical history and problem list.    Review of Systems   Respiratory: Negative for shortness of breath.    Cardiovascular: Negative for chest pain, palpitations and leg swelling.   Gastrointestinal: Negative.    Psychiatric/Behavioral: Positive for confusion. Negative for dysphoric mood. The patient is not nervous/anxious.    All other systems reviewed and are negative.      Objective   /58  Pulse 70  Temp 97.7 °F (36.5 °C)  Ht 67\" (170.2 cm)  Wt 207 lb 8 oz (94.1 kg)  SpO2 96%  BMI 32.5 kg/m2  Body mass index is 32.5 kg/(m^2).  Physical Exam   Constitutional: She is oriented to person, place, and time. She appears well-developed and well-nourished.   HENT:   Head: Normocephalic and atraumatic.   Mouth/Throat: Oropharynx is clear and moist.   Eyes: " Conjunctivae and EOM are normal. Pupils are equal, round, and reactive to light.   Neck: Normal range of motion. Neck supple. No thyromegaly present.   Cardiovascular: Normal rate, normal heart sounds and intact distal pulses.    No murmur heard.  Irregularly irregular   Pulmonary/Chest: Effort normal and breath sounds normal. No respiratory distress.   Abdominal: Soft. Bowel sounds are normal.   Musculoskeletal: She exhibits no edema.   Lymphadenopathy:     She has no cervical adenopathy.   Neurological: She is alert and oriented to person, place, and time. No cranial nerve deficit.   Psychiatric: She has a normal mood and affect. Judgment normal.   Nursing note and vitals reviewed.      Assessment/Plan   Nery Garcia is here today and the following problems have been addressed:      Nery was seen today for follow-up.    Diagnoses and all orders for this visit:    Essential hypertension    Mixed hyperlipidemia  -     Comprehensive Metabolic Panel  -     Lipid Panel    Other emphysema    Gastroesophageal reflux disease, esophagitis presence not specified    Other depression    Dementia without behavioral disturbance, unspecified dementia type    Other orders  -     sotalol (BETAPACE) 80 MG tablet; Take 1 tablet by mouth 2 (Two) Times a Day.  -     amLODIPine (NORVASC) 10 MG tablet; Take 1 tablet by mouth Daily.  -     montelukast (SINGULAIR) 10 MG tablet; Take 1 tablet by mouth Daily.  -     pantoprazole (PROTONIX) 40 MG EC tablet; Take 1 tablet by mouth Daily.  -     losartan (COZAAR) 100 MG tablet; Take 1 tablet by mouth Daily.    Follow heart healthy/low salt diet  Avoid processed foods  Monitor blood pressure as discussed  Exercise as tolerated up to 30 minutes 5 days per week  Take all medications as prescribed  Labs as noted  Her GERD is well controlled  Encouraged her to be compliant with PM meds    RTC 4 mo  Please note that portions of this note were completed with a voice recognition program.   Efforts were made to edit dictation, but occasionally words are mistranscribed.

## 2017-06-20 ENCOUNTER — TELEPHONE (OUTPATIENT)
Dept: INTERNAL MEDICINE | Facility: CLINIC | Age: 67
End: 2017-06-20

## 2017-06-20 RX ORDER — PERMETHRIN 50 MG/G
CREAM TOPICAL
Qty: 30 G | Refills: 1 | Status: SHIPPED | OUTPATIENT
Start: 2017-06-20 | End: 2017-08-14

## 2017-06-20 NOTE — TELEPHONE ENCOUNTER
Elimite 1% apply head to toe and leave on for 8 hours overnight.  Wash or rinse off and repeat same thing in 7 days.  Call in a 30 g bottle with 1 refill . Tell her to wash all bedding in hot water and dry on hot air.  I recommend that she spray all   of her couches and seats with Lysol.

## 2017-06-20 NOTE — TELEPHONE ENCOUNTER
Patient called the office requesting to see if Dr Vermeesch could send her something out to [facility] pharmacy for scabies. She said that she went to the vet and her dog has them and she has them too. She states that she needs the medication as soon as she could.

## 2017-06-20 NOTE — TELEPHONE ENCOUNTER
Patient is calling again. She would like to know when the medicine for scabies gets called in.    She'd like a callback.

## 2017-06-21 NOTE — TELEPHONE ENCOUNTER
Spoke with Pharmacy, it only comes in tube. Dr. Vermeesch suggest Pt use OTC medication if needed.

## 2017-06-21 NOTE — TELEPHONE ENCOUNTER
Patient is calling in regarding to the medicine she got for scabies. She said she needs to repeat the treatment, but Dr. Vermeesch didn't say in how many days she needs to repeat it.    She'd like a callback.

## 2017-06-21 NOTE — TELEPHONE ENCOUNTER
30 g of Elimite should've been enough to cover from head to toe except in eyes and mouth and ears.  Yesterday his head to toe and is meant to be left on for 8 hours and then washed off the next day.

## 2017-06-21 NOTE — TELEPHONE ENCOUNTER
Pt notified she needs to repeat it in 7 days. Pt wanted to know if she really has to apply head to toe, that it's a small tube and it cost $70. She would like to know if she can just apply it to the affected area?

## 2017-06-23 ENCOUNTER — TELEPHONE (OUTPATIENT)
Dept: INTERNAL MEDICINE | Facility: CLINIC | Age: 67
End: 2017-06-23

## 2017-06-23 NOTE — TELEPHONE ENCOUNTER
No there is not.  As stated it is normal to be itchy for the week in between before the second round of medication.  This is because the adult scabies have laid eggs and a second round is hatching.  She needs to take oral Benadryl 25 mg 2-3 times yuni  ly and applied topical calamine as recommended.

## 2017-06-23 NOTE — TELEPHONE ENCOUNTER
That is completely normal.  That is why she needs to do a second round of medication in one week.  I recommend that she take Benadryl and apply calamine lotion.  Did she apply the medication had to toe as directed?

## 2017-06-23 NOTE — TELEPHONE ENCOUNTER
PLEASE RETURN CALL. PATIENT SAID SHE'S BEEN USING THE CREAM DR. VERMEESCH PRESCRIBED FOR SCABIES, BUT SHE SAID IT HASN'T HELPED. SHE WOULD LIKE TO KNOW IF SHE CAN COME INTO THE OFFICE AND GET A SHOT?

## 2017-06-23 NOTE — TELEPHONE ENCOUNTER
Pt states scabies has spread and is really itchy. She would like to know if she can come in for a shot of something to help. Or what you recommend.

## 2017-07-18 RX ORDER — ATORVASTATIN CALCIUM 40 MG/1
TABLET, FILM COATED ORAL
Qty: 30 TABLET | Refills: 6 | Status: SHIPPED | OUTPATIENT
Start: 2017-07-18 | End: 2017-07-18 | Stop reason: SDUPTHER

## 2017-07-18 RX ORDER — ATORVASTATIN CALCIUM 40 MG/1
40 TABLET, FILM COATED ORAL DAILY
Qty: 90 TABLET | Refills: 3 | Status: SHIPPED | OUTPATIENT
Start: 2017-07-18 | End: 2017-09-20 | Stop reason: SDUPTHER

## 2017-07-28 ENCOUNTER — LAB (OUTPATIENT)
Dept: LAB | Facility: HOSPITAL | Age: 67
End: 2017-07-28
Attending: INTERNAL MEDICINE

## 2017-07-28 ENCOUNTER — TRANSCRIBE ORDERS (OUTPATIENT)
Dept: LAB | Facility: HOSPITAL | Age: 67
End: 2017-07-28

## 2017-07-28 DIAGNOSIS — R19.7 DIARRHEA, UNSPECIFIED TYPE: ICD-10-CM

## 2017-07-28 DIAGNOSIS — R19.7 DIARRHEA, UNSPECIFIED TYPE: Primary | ICD-10-CM

## 2017-07-28 LAB
ALBUMIN SERPL-MCNC: 4.1 G/DL (ref 3.5–5)
ALBUMIN/GLOB SERPL: 1.6 G/DL (ref 1–2)
ALP SERPL-CCNC: 66 U/L (ref 38–126)
ALT SERPL W P-5'-P-CCNC: 35 U/L (ref 13–69)
ANION GAP SERPL CALCULATED.3IONS-SCNC: 13.7 MMOL/L
AST SERPL-CCNC: 22 U/L (ref 15–46)
BASOPHILS # BLD AUTO: 0.02 10*3/MM3 (ref 0–0.2)
BASOPHILS NFR BLD AUTO: 0.3 % (ref 0–2.5)
BILIRUB SERPL-MCNC: 0.8 MG/DL (ref 0.2–1.3)
BUN BLD-MCNC: 11 MG/DL (ref 7–20)
BUN/CREAT SERPL: 15.7 (ref 7.1–23.5)
C DIFF TOX A+B STL QL IA: NEGATIVE
CALCIUM SPEC-SCNC: 8.9 MG/DL (ref 8.4–10.2)
CHLORIDE SERPL-SCNC: 104 MMOL/L (ref 98–107)
CO2 SERPL-SCNC: 29 MMOL/L (ref 26–30)
CREAT BLD-MCNC: 0.7 MG/DL (ref 0.6–1.3)
DEPRECATED RDW RBC AUTO: 44.4 FL (ref 37–54)
EOSINOPHIL # BLD AUTO: 0.15 10*3/MM3 (ref 0–0.7)
EOSINOPHIL NFR BLD AUTO: 2.5 % (ref 0–7)
ERYTHROCYTE [DISTWIDTH] IN BLOOD BY AUTOMATED COUNT: 13 % (ref 11.5–14.5)
GFR SERPL CREATININE-BSD FRML MDRD: 84 ML/MIN/1.73
GLOBULIN UR ELPH-MCNC: 2.6 GM/DL
GLUCOSE BLD-MCNC: 108 MG/DL (ref 74–98)
HCT VFR BLD AUTO: 38.6 % (ref 37–47)
HEMOCCULT STL QL: NEGATIVE
HGB BLD-MCNC: 12.7 G/DL (ref 12–16)
IMM GRANULOCYTES # BLD: 0.02 10*3/MM3 (ref 0–0.06)
IMM GRANULOCYTES NFR BLD: 0.3 % (ref 0–0.6)
LYMPHOCYTES # BLD AUTO: 1.76 10*3/MM3 (ref 0.6–3.4)
LYMPHOCYTES NFR BLD AUTO: 29.8 % (ref 10–50)
MCH RBC QN AUTO: 30.8 PG (ref 27–31)
MCHC RBC AUTO-ENTMCNC: 32.9 G/DL (ref 30–37)
MCV RBC AUTO: 93.7 FL (ref 81–99)
MONOCYTES # BLD AUTO: 0.54 10*3/MM3 (ref 0–0.9)
MONOCYTES NFR BLD AUTO: 9.1 % (ref 0–12)
NEUTROPHILS # BLD AUTO: 3.42 10*3/MM3 (ref 2–6.9)
NEUTROPHILS NFR BLD AUTO: 58 % (ref 37–80)
NRBC BLD MANUAL-RTO: 0 /100 WBC (ref 0–0)
PLAT MORPH BLD: NORMAL
PLATELET # BLD AUTO: 178 10*3/MM3 (ref 130–400)
PMV BLD AUTO: 10.2 FL (ref 6–12)
POTASSIUM BLD-SCNC: 3.7 MMOL/L (ref 3.5–5.1)
PROT SERPL-MCNC: 6.7 G/DL (ref 6.3–8.2)
RBC # BLD AUTO: 4.12 10*6/MM3 (ref 4.2–5.4)
RBC MORPH BLD: NORMAL
SODIUM BLD-SCNC: 143 MMOL/L (ref 137–145)
WBC MORPH BLD: NORMAL
WBC NRBC COR # BLD: 5.91 10*3/MM3 (ref 4.8–10.8)
WBC STL QL MICRO: NORMAL

## 2017-07-30 ENCOUNTER — HOSPITAL ENCOUNTER (INPATIENT)
Facility: HOSPITAL | Age: 67
LOS: 3 days | Discharge: HOME | End: 2017-08-02
Attending: EMERGENCY MEDICINE | Admitting: INTERNAL MEDICINE

## 2017-07-30 ENCOUNTER — APPOINTMENT (OUTPATIENT)
Dept: CT IMAGING | Facility: HOSPITAL | Age: 67
End: 2017-07-30

## 2017-07-30 DIAGNOSIS — K52.9 COLITIS: Primary | ICD-10-CM

## 2017-07-30 LAB
ALBUMIN SERPL-MCNC: 4 G/DL (ref 3.5–5)
ALBUMIN/GLOB SERPL: 1.5 G/DL (ref 1–2)
ALP SERPL-CCNC: 68 U/L (ref 38–126)
ALT SERPL W P-5'-P-CCNC: 30 U/L (ref 13–69)
ANION GAP SERPL CALCULATED.3IONS-SCNC: 14.5 MMOL/L
AST SERPL-CCNC: 22 U/L (ref 15–46)
BACTERIA UR QL AUTO: ABNORMAL /HPF
BASOPHILS # BLD AUTO: 0.03 10*3/MM3 (ref 0–0.2)
BASOPHILS NFR BLD AUTO: 0.6 % (ref 0–2.5)
BILIRUB SERPL-MCNC: 0.4 MG/DL (ref 0.2–1.3)
BILIRUB UR QL STRIP: ABNORMAL
BUN BLD-MCNC: 12 MG/DL (ref 7–20)
BUN/CREAT SERPL: 12 (ref 7.1–23.5)
CALCIUM SPEC-SCNC: 9.1 MG/DL (ref 8.4–10.2)
CHLORIDE SERPL-SCNC: 108 MMOL/L (ref 98–107)
CLARITY UR: CLEAR
CO2 SERPL-SCNC: 27 MMOL/L (ref 26–30)
COLOR UR: YELLOW
CREAT BLD-MCNC: 1 MG/DL (ref 0.6–1.3)
DEPRECATED RDW RBC AUTO: 44.8 FL (ref 37–54)
EOSINOPHIL # BLD AUTO: 0.14 10*3/MM3 (ref 0–0.7)
EOSINOPHIL NFR BLD AUTO: 2.8 % (ref 0–7)
ERYTHROCYTE [DISTWIDTH] IN BLOOD BY AUTOMATED COUNT: 13.3 % (ref 11.5–14.5)
GFR SERPL CREATININE-BSD FRML MDRD: 56 ML/MIN/1.73
GLOBULIN UR ELPH-MCNC: 2.7 GM/DL
GLUCOSE BLD-MCNC: 106 MG/DL (ref 74–98)
GLUCOSE UR STRIP-MCNC: NEGATIVE MG/DL
HCT VFR BLD AUTO: 35.4 % (ref 37–47)
HEMOCCULT STL QL: POSITIVE
HGB BLD-MCNC: 11.6 G/DL (ref 12–16)
HGB UR QL STRIP.AUTO: ABNORMAL
HOLD SPECIMEN: NORMAL
HOLD SPECIMEN: NORMAL
HYALINE CASTS UR QL AUTO: ABNORMAL /LPF
IMM GRANULOCYTES # BLD: 0.01 10*3/MM3 (ref 0–0.06)
IMM GRANULOCYTES NFR BLD: 0.2 % (ref 0–0.6)
INR PPP: 1.2 (ref 0.9–1.1)
KETONES UR QL STRIP: ABNORMAL
LEUKOCYTE ESTERASE UR QL STRIP.AUTO: NEGATIVE
LIPASE SERPL-CCNC: 137 U/L (ref 23–300)
LYMPHOCYTES # BLD AUTO: 1.93 10*3/MM3 (ref 0.6–3.4)
LYMPHOCYTES NFR BLD AUTO: 38.7 % (ref 10–50)
MCH RBC QN AUTO: 30.5 PG (ref 27–31)
MCHC RBC AUTO-ENTMCNC: 32.8 G/DL (ref 30–37)
MCV RBC AUTO: 93.2 FL (ref 81–99)
MONOCYTES # BLD AUTO: 0.53 10*3/MM3 (ref 0–0.9)
MONOCYTES NFR BLD AUTO: 10.6 % (ref 0–12)
MUCOUS THREADS URNS QL MICRO: ABNORMAL /HPF
NEUTROPHILS # BLD AUTO: 2.35 10*3/MM3 (ref 2–6.9)
NEUTROPHILS NFR BLD AUTO: 47.1 % (ref 37–80)
NITRITE UR QL STRIP: NEGATIVE
NRBC BLD MANUAL-RTO: 0 /100 WBC (ref 0–0)
PH UR STRIP.AUTO: 6 [PH] (ref 5–8)
PLATELET # BLD AUTO: 161 10*3/MM3 (ref 130–400)
PMV BLD AUTO: 9.9 FL (ref 6–12)
POTASSIUM BLD-SCNC: 3.5 MMOL/L (ref 3.5–5.1)
PROT SERPL-MCNC: 6.7 G/DL (ref 6.3–8.2)
PROT UR QL STRIP: ABNORMAL
PROTHROMBIN TIME: 13.1 SECONDS (ref 9.3–12.1)
RBC # BLD AUTO: 3.8 10*6/MM3 (ref 4.2–5.4)
RBC # UR: ABNORMAL /HPF
REF LAB TEST METHOD: ABNORMAL
SODIUM BLD-SCNC: 146 MMOL/L (ref 137–145)
SP GR UR STRIP: 1.02 (ref 1–1.03)
SQUAMOUS #/AREA URNS HPF: ABNORMAL /HPF
UROBILINOGEN UR QL STRIP: ABNORMAL
WBC NRBC COR # BLD: 4.99 10*3/MM3 (ref 4.8–10.8)
WBC UR QL AUTO: ABNORMAL /HPF
WHOLE BLOOD HOLD SPECIMEN: NORMAL
WHOLE BLOOD HOLD SPECIMEN: NORMAL

## 2017-07-30 PROCEDURE — 85610 PROTHROMBIN TIME: CPT | Performed by: EMERGENCY MEDICINE

## 2017-07-30 PROCEDURE — 81001 URINALYSIS AUTO W/SCOPE: CPT | Performed by: EMERGENCY MEDICINE

## 2017-07-30 PROCEDURE — 74177 CT ABD & PELVIS W/CONTRAST: CPT

## 2017-07-30 PROCEDURE — 80053 COMPREHEN METABOLIC PANEL: CPT | Performed by: EMERGENCY MEDICINE

## 2017-07-30 PROCEDURE — G0378 HOSPITAL OBSERVATION PER HR: HCPCS

## 2017-07-30 PROCEDURE — 82272 OCCULT BLD FECES 1-3 TESTS: CPT | Performed by: EMERGENCY MEDICINE

## 2017-07-30 PROCEDURE — 85025 COMPLETE CBC W/AUTO DIFF WBC: CPT | Performed by: EMERGENCY MEDICINE

## 2017-07-30 PROCEDURE — 0 IOPAMIDOL 61 % SOLUTION: Performed by: EMERGENCY MEDICINE

## 2017-07-30 PROCEDURE — 99284 EMERGENCY DEPT VISIT MOD MDM: CPT

## 2017-07-30 PROCEDURE — 83690 ASSAY OF LIPASE: CPT | Performed by: EMERGENCY MEDICINE

## 2017-07-30 RX ORDER — PANTOPRAZOLE SODIUM 40 MG/1
40 TABLET, DELAYED RELEASE ORAL DAILY
Status: DISCONTINUED | OUTPATIENT
Start: 2017-07-31 | End: 2017-08-02 | Stop reason: HOSPADM

## 2017-07-30 RX ORDER — MONTELUKAST SODIUM 10 MG/1
10 TABLET ORAL DAILY
Status: DISCONTINUED | OUTPATIENT
Start: 2017-07-31 | End: 2017-08-02 | Stop reason: HOSPADM

## 2017-07-30 RX ORDER — LEVOFLOXACIN 5 MG/ML
750 INJECTION, SOLUTION INTRAVENOUS EVERY 24 HOURS
Status: DISCONTINUED | OUTPATIENT
Start: 2017-08-01 | End: 2017-08-02 | Stop reason: SDUPTHER

## 2017-07-30 RX ORDER — SOTALOL HYDROCHLORIDE 80 MG/1
80 TABLET ORAL 2 TIMES DAILY
Status: DISCONTINUED | OUTPATIENT
Start: 2017-07-31 | End: 2017-08-02 | Stop reason: HOSPADM

## 2017-07-30 RX ORDER — ACETAMINOPHEN 325 MG/1
650 TABLET ORAL EVERY 4 HOURS PRN
Status: DISCONTINUED | OUTPATIENT
Start: 2017-07-30 | End: 2017-08-02 | Stop reason: HOSPADM

## 2017-07-30 RX ORDER — CHOLECALCIFEROL (VITAMIN D3) 125 MCG
1000 CAPSULE ORAL DAILY
Status: DISCONTINUED | OUTPATIENT
Start: 2017-07-31 | End: 2017-08-02 | Stop reason: HOSPADM

## 2017-07-30 RX ORDER — IPRATROPIUM BROMIDE AND ALBUTEROL SULFATE 2.5; .5 MG/3ML; MG/3ML
3 SOLUTION RESPIRATORY (INHALATION)
Status: DISCONTINUED | OUTPATIENT
Start: 2017-07-31 | End: 2017-08-02 | Stop reason: HOSPADM

## 2017-07-30 RX ORDER — SODIUM CHLORIDE 0.9 % (FLUSH) 0.9 %
10 SYRINGE (ML) INJECTION AS NEEDED
Status: DISCONTINUED | OUTPATIENT
Start: 2017-07-30 | End: 2017-08-02 | Stop reason: HOSPADM

## 2017-07-30 RX ORDER — SODIUM CHLORIDE 0.9 % (FLUSH) 0.9 %
1-10 SYRINGE (ML) INJECTION AS NEEDED
Status: DISCONTINUED | OUTPATIENT
Start: 2017-07-30 | End: 2017-08-02 | Stop reason: HOSPADM

## 2017-07-30 RX ORDER — METRONIDAZOLE 500 MG/1
500 TABLET ORAL ONCE
Status: DISCONTINUED | OUTPATIENT
Start: 2017-07-30 | End: 2017-07-30

## 2017-07-30 RX ORDER — ESCITALOPRAM OXALATE 20 MG/1
20 TABLET ORAL DAILY
Status: DISCONTINUED | OUTPATIENT
Start: 2017-07-31 | End: 2017-08-02 | Stop reason: HOSPADM

## 2017-07-30 RX ORDER — FLUTICASONE PROPIONATE 50 MCG
2 SPRAY, SUSPENSION (ML) NASAL DAILY
Status: DISCONTINUED | OUTPATIENT
Start: 2017-07-31 | End: 2017-08-02 | Stop reason: HOSPADM

## 2017-07-30 RX ORDER — ONDANSETRON 2 MG/ML
4 INJECTION INTRAMUSCULAR; INTRAVENOUS EVERY 6 HOURS PRN
Status: DISCONTINUED | OUTPATIENT
Start: 2017-07-30 | End: 2017-08-02 | Stop reason: HOSPADM

## 2017-07-30 RX ORDER — NALOXONE HCL 0.4 MG/ML
0.4 VIAL (ML) INJECTION
Status: DISCONTINUED | OUTPATIENT
Start: 2017-07-30 | End: 2017-08-02 | Stop reason: HOSPADM

## 2017-07-30 RX ORDER — MORPHINE SULFATE 2 MG/ML
2 INJECTION, SOLUTION INTRAMUSCULAR; INTRAVENOUS EVERY 4 HOURS PRN
Status: DISCONTINUED | OUTPATIENT
Start: 2017-07-30 | End: 2017-08-02 | Stop reason: HOSPADM

## 2017-07-30 RX ORDER — SODIUM CHLORIDE 9 MG/ML
100 INJECTION, SOLUTION INTRAVENOUS CONTINUOUS
Status: DISCONTINUED | OUTPATIENT
Start: 2017-07-30 | End: 2017-07-31

## 2017-07-30 RX ORDER — MULTIVIT WITH MINERALS/LUTEIN
1000 TABLET ORAL DAILY
Status: DISCONTINUED | OUTPATIENT
Start: 2017-07-31 | End: 2017-08-02 | Stop reason: HOSPADM

## 2017-07-30 RX ORDER — CALCIUM POLYCARBOPHIL 625 MG
625 TABLET ORAL DAILY
Status: DISCONTINUED | OUTPATIENT
Start: 2017-07-31 | End: 2017-08-02 | Stop reason: HOSPADM

## 2017-07-30 RX ORDER — ASPIRIN 81 MG/1
81 TABLET ORAL DAILY
Status: DISCONTINUED | OUTPATIENT
Start: 2017-07-31 | End: 2017-07-31

## 2017-07-30 RX ORDER — ATORVASTATIN CALCIUM 40 MG/1
40 TABLET, FILM COATED ORAL NIGHTLY
Status: DISCONTINUED | OUTPATIENT
Start: 2017-07-30 | End: 2017-08-02 | Stop reason: HOSPADM

## 2017-07-30 RX ORDER — DONEPEZIL HYDROCHLORIDE 5 MG/1
10 TABLET, FILM COATED ORAL NIGHTLY
Status: DISCONTINUED | OUTPATIENT
Start: 2017-07-30 | End: 2017-08-02 | Stop reason: HOSPADM

## 2017-07-30 RX ORDER — LEVOFLOXACIN 5 MG/ML
750 INJECTION, SOLUTION INTRAVENOUS ONCE
Status: COMPLETED | OUTPATIENT
Start: 2017-07-30 | End: 2017-07-31

## 2017-07-30 RX ORDER — AMLODIPINE BESYLATE 5 MG/1
10 TABLET ORAL NIGHTLY
Status: DISCONTINUED | OUTPATIENT
Start: 2017-07-30 | End: 2017-08-02 | Stop reason: HOSPADM

## 2017-07-30 RX ORDER — LEVOFLOXACIN 750 MG/1
750 TABLET ORAL ONCE
Status: DISCONTINUED | OUTPATIENT
Start: 2017-07-30 | End: 2017-07-30

## 2017-07-30 RX ORDER — PERMETHRIN 50 MG/G
CREAM TOPICAL ONCE
Status: COMPLETED | OUTPATIENT
Start: 2017-07-30 | End: 2017-07-31

## 2017-07-30 RX ORDER — L.ACID,PARA/B.BIFIDUM/S.THERM 8B CELL
1 CAPSULE ORAL DAILY
Status: DISCONTINUED | OUTPATIENT
Start: 2017-07-31 | End: 2017-08-02 | Stop reason: HOSPADM

## 2017-07-30 RX ADMIN — IOPAMIDOL 100 ML: 612 INJECTION, SOLUTION INTRAVENOUS at 20:52

## 2017-07-30 RX ADMIN — SODIUM CHLORIDE 1000 ML: 9 INJECTION, SOLUTION INTRAVENOUS at 21:16

## 2017-07-31 LAB
ANION GAP SERPL CALCULATED.3IONS-SCNC: 12.4 MMOL/L
BACTERIA SPEC AEROBE CULT: NORMAL
BUN BLD-MCNC: 10 MG/DL (ref 7–20)
BUN/CREAT SERPL: 16.7 (ref 7.1–23.5)
C DIFF TOX A+B STL QL IA: NEGATIVE
CALCIUM SPEC-SCNC: 8.5 MG/DL (ref 8.4–10.2)
CHLORIDE SERPL-SCNC: 107 MMOL/L (ref 98–107)
CO2 SERPL-SCNC: 27 MMOL/L (ref 26–30)
CREAT BLD-MCNC: 0.6 MG/DL (ref 0.6–1.3)
DEPRECATED RDW RBC AUTO: 44.3 FL (ref 37–54)
ERYTHROCYTE [DISTWIDTH] IN BLOOD BY AUTOMATED COUNT: 13.2 % (ref 11.5–14.5)
GFR SERPL CREATININE-BSD FRML MDRD: 100 ML/MIN/1.73
GLUCOSE BLD-MCNC: 120 MG/DL (ref 74–98)
HCT VFR BLD AUTO: 33.3 % (ref 37–47)
HGB BLD-MCNC: 11.1 G/DL (ref 12–16)
MAGNESIUM SERPL-MCNC: 1.8 MG/DL (ref 1.6–2.3)
MCH RBC QN AUTO: 30.8 PG (ref 27–31)
MCHC RBC AUTO-ENTMCNC: 33.3 G/DL (ref 30–37)
MCV RBC AUTO: 92.5 FL (ref 81–99)
PHOSPHATE SERPL-MCNC: 3.3 MG/DL (ref 2.5–4.5)
PLATELET # BLD AUTO: 143 10*3/MM3 (ref 130–400)
PMV BLD AUTO: 10.3 FL (ref 6–12)
POTASSIUM BLD-SCNC: 3.4 MMOL/L (ref 3.5–5.1)
RBC # BLD AUTO: 3.6 10*6/MM3 (ref 4.2–5.4)
SODIUM BLD-SCNC: 143 MMOL/L (ref 137–145)
TROPONIN I SERPL-MCNC: <0.012 NG/ML (ref 0–0.03)
WBC NRBC COR # BLD: 4.13 10*3/MM3 (ref 4.8–10.8)
WBC STL QL MICRO: NORMAL

## 2017-07-31 PROCEDURE — 80048 BASIC METABOLIC PNL TOTAL CA: CPT | Performed by: INTERNAL MEDICINE

## 2017-07-31 PROCEDURE — 85027 COMPLETE CBC AUTOMATED: CPT | Performed by: INTERNAL MEDICINE

## 2017-07-31 PROCEDURE — 84484 ASSAY OF TROPONIN QUANT: CPT | Performed by: INTERNAL MEDICINE

## 2017-07-31 PROCEDURE — 87045 FECES CULTURE AEROBIC BACT: CPT | Performed by: INTERNAL MEDICINE

## 2017-07-31 PROCEDURE — 25810000003 SODIUM CHLORIDE 0.9 % WITH KCL 20 MEQ 20-0.9 MEQ/L-% SOLUTION: Performed by: INTERNAL MEDICINE

## 2017-07-31 PROCEDURE — 87205 SMEAR GRAM STAIN: CPT | Performed by: INTERNAL MEDICINE

## 2017-07-31 PROCEDURE — 84100 ASSAY OF PHOSPHORUS: CPT | Performed by: INTERNAL MEDICINE

## 2017-07-31 PROCEDURE — 87046 STOOL CULTR AEROBIC BACT EA: CPT | Performed by: INTERNAL MEDICINE

## 2017-07-31 PROCEDURE — 94799 UNLISTED PULMONARY SVC/PX: CPT

## 2017-07-31 PROCEDURE — 99222 1ST HOSP IP/OBS MODERATE 55: CPT | Performed by: INTERNAL MEDICINE

## 2017-07-31 PROCEDURE — 94640 AIRWAY INHALATION TREATMENT: CPT

## 2017-07-31 PROCEDURE — 87324 CLOSTRIDIUM AG IA: CPT | Performed by: INTERNAL MEDICINE

## 2017-07-31 PROCEDURE — 83735 ASSAY OF MAGNESIUM: CPT | Performed by: INTERNAL MEDICINE

## 2017-07-31 PROCEDURE — 25010000002 LEVOFLOXACIN PER 250 MG: Performed by: INTERNAL MEDICINE

## 2017-07-31 RX ORDER — MORPHINE SULFATE 2 MG/ML
1 INJECTION, SOLUTION INTRAMUSCULAR; INTRAVENOUS EVERY 4 HOURS PRN
Status: DISCONTINUED | OUTPATIENT
Start: 2017-07-31 | End: 2017-08-02 | Stop reason: HOSPADM

## 2017-07-31 RX ORDER — POTASSIUM CHLORIDE 750 MG/1
20 CAPSULE, EXTENDED RELEASE ORAL ONCE
Status: COMPLETED | OUTPATIENT
Start: 2017-07-31 | End: 2017-07-31

## 2017-07-31 RX ORDER — SODIUM CHLORIDE AND POTASSIUM CHLORIDE 150; 900 MG/100ML; MG/100ML
100 INJECTION, SOLUTION INTRAVENOUS CONTINUOUS
Status: DISCONTINUED | OUTPATIENT
Start: 2017-07-31 | End: 2017-08-01

## 2017-07-31 RX ADMIN — DONEPEZIL HYDROCHLORIDE 10 MG: 5 TABLET, FILM COATED ORAL at 00:02

## 2017-07-31 RX ADMIN — ESCITALOPRAM OXALATE 20 MG: 20 TABLET ORAL at 08:41

## 2017-07-31 RX ADMIN — PERMETHRIN 1 APPLICATION: 50 CREAM TOPICAL at 00:03

## 2017-07-31 RX ADMIN — ACETAMINOPHEN 650 MG: 325 TABLET, FILM COATED ORAL at 13:52

## 2017-07-31 RX ADMIN — CARBIDOPA AND LEVODOPA 1 TABLET: 25; 100 TABLET ORAL at 20:39

## 2017-07-31 RX ADMIN — IPRATROPIUM BROMIDE AND ALBUTEROL SULFATE 3 ML: .5; 3 SOLUTION RESPIRATORY (INHALATION) at 07:08

## 2017-07-31 RX ADMIN — Medication 1000 UNITS: at 08:40

## 2017-07-31 RX ADMIN — ATORVASTATIN CALCIUM 40 MG: 40 TABLET, FILM COATED ORAL at 00:02

## 2017-07-31 RX ADMIN — SOTALOL HYDROCHLORIDE 80 MG: 80 TABLET ORAL at 18:37

## 2017-07-31 RX ADMIN — POTASSIUM CHLORIDE 20 MEQ: 750 CAPSULE, EXTENDED RELEASE ORAL at 13:16

## 2017-07-31 RX ADMIN — PANTOPRAZOLE SODIUM 40 MG: 40 TABLET, DELAYED RELEASE ORAL at 08:42

## 2017-07-31 RX ADMIN — FLUTICASONE PROPIONATE 2 SPRAY: 50 SPRAY, METERED NASAL at 08:40

## 2017-07-31 RX ADMIN — METRONIDAZOLE 500 MG: 500 INJECTION, SOLUTION INTRAVENOUS at 09:08

## 2017-07-31 RX ADMIN — AMLODIPINE BESYLATE 10 MG: 5 TABLET ORAL at 20:39

## 2017-07-31 RX ADMIN — DONEPEZIL HYDROCHLORIDE 10 MG: 5 TABLET, FILM COATED ORAL at 20:38

## 2017-07-31 RX ADMIN — IPRATROPIUM BROMIDE AND ALBUTEROL SULFATE 3 ML: .5; 3 SOLUTION RESPIRATORY (INHALATION) at 19:12

## 2017-07-31 RX ADMIN — Medication 1 CAPSULE: at 08:41

## 2017-07-31 RX ADMIN — METRONIDAZOLE 500 MG: 500 INJECTION, SOLUTION INTRAVENOUS at 23:34

## 2017-07-31 RX ADMIN — CYANOCOBALAMIN TAB 500 MCG 1000 MCG: 500 TAB at 08:41

## 2017-07-31 RX ADMIN — ATORVASTATIN CALCIUM 40 MG: 40 TABLET, FILM COATED ORAL at 20:39

## 2017-07-31 RX ADMIN — SOTALOL HYDROCHLORIDE 80 MG: 80 TABLET ORAL at 00:02

## 2017-07-31 RX ADMIN — IPRATROPIUM BROMIDE AND ALBUTEROL SULFATE 3 ML: .5; 3 SOLUTION RESPIRATORY (INHALATION) at 00:30

## 2017-07-31 RX ADMIN — AMLODIPINE BESYLATE 10 MG: 5 TABLET ORAL at 00:02

## 2017-07-31 RX ADMIN — MONTELUKAST SODIUM 10 MG: 10 TABLET, FILM COATED ORAL at 08:41

## 2017-07-31 RX ADMIN — CARBIDOPA AND LEVODOPA 1 TABLET: 25; 100 TABLET ORAL at 00:02

## 2017-07-31 RX ADMIN — OYSTER SHELL CALCIUM WITH VITAMIN D 1 TABLET: 500; 200 TABLET, FILM COATED ORAL at 08:42

## 2017-07-31 RX ADMIN — LEVOFLOXACIN 750 MG: 5 INJECTION, SOLUTION INTRAVENOUS at 00:04

## 2017-07-31 RX ADMIN — METRONIDAZOLE 500 MG: 500 INJECTION, SOLUTION INTRAVENOUS at 16:24

## 2017-07-31 RX ADMIN — METRONIDAZOLE 500 MG: 500 INJECTION, SOLUTION INTRAVENOUS at 00:05

## 2017-07-31 RX ADMIN — SODIUM CHLORIDE 100 ML/HR: 9 INJECTION, SOLUTION INTRAVENOUS at 00:04

## 2017-07-31 RX ADMIN — SODIUM CHLORIDE 100 ML/HR: 9 INJECTION, SOLUTION INTRAVENOUS at 18:45

## 2017-07-31 RX ADMIN — SOTALOL HYDROCHLORIDE 80 MG: 80 TABLET ORAL at 08:42

## 2017-07-31 RX ADMIN — SODIUM CHLORIDE AND POTASSIUM CHLORIDE 100 ML/HR: 9; 1.49 INJECTION, SOLUTION INTRAVENOUS at 20:53

## 2017-07-31 RX ADMIN — CALCIUM POLYCARBOPHIL 625 MG: 625 TABLET, FILM COATED ORAL at 08:41

## 2017-08-01 LAB
ALBUMIN SERPL-MCNC: 3.9 G/DL (ref 3.5–5)
ALBUMIN/GLOB SERPL: 1.5 G/DL (ref 1–2)
ALP SERPL-CCNC: 65 U/L (ref 38–126)
ALT SERPL W P-5'-P-CCNC: 27 U/L (ref 13–69)
ANION GAP SERPL CALCULATED.3IONS-SCNC: 13.7 MMOL/L
AST SERPL-CCNC: 21 U/L (ref 15–46)
BILIRUB SERPL-MCNC: 0.5 MG/DL (ref 0.2–1.3)
BUN BLD-MCNC: 5 MG/DL (ref 7–20)
BUN/CREAT SERPL: 8.3 (ref 7.1–23.5)
CALCIUM SPEC-SCNC: 9 MG/DL (ref 8.4–10.2)
CHLORIDE SERPL-SCNC: 106 MMOL/L (ref 98–107)
CO2 SERPL-SCNC: 27 MMOL/L (ref 26–30)
CREAT BLD-MCNC: 0.6 MG/DL (ref 0.6–1.3)
DEPRECATED RDW RBC AUTO: 44.9 FL (ref 37–54)
ERYTHROCYTE [DISTWIDTH] IN BLOOD BY AUTOMATED COUNT: 13.3 % (ref 11.5–14.5)
GFR SERPL CREATININE-BSD FRML MDRD: 100 ML/MIN/1.73
GLOBULIN UR ELPH-MCNC: 2.6 GM/DL
GLUCOSE BLD-MCNC: 107 MG/DL (ref 74–98)
HCT VFR BLD AUTO: 36.6 % (ref 37–47)
HGB BLD-MCNC: 11.9 G/DL (ref 12–16)
MCH RBC QN AUTO: 30.1 PG (ref 27–31)
MCHC RBC AUTO-ENTMCNC: 32.5 G/DL (ref 30–37)
MCV RBC AUTO: 92.4 FL (ref 81–99)
PHOSPHATE SERPL-MCNC: 3.2 MG/DL (ref 2.5–4.5)
PLATELET # BLD AUTO: 161 10*3/MM3 (ref 130–400)
PMV BLD AUTO: 10.2 FL (ref 6–12)
POTASSIUM BLD-SCNC: 3.7 MMOL/L (ref 3.5–5.1)
PROT SERPL-MCNC: 6.5 G/DL (ref 6.3–8.2)
RBC # BLD AUTO: 3.96 10*6/MM3 (ref 4.2–5.4)
SODIUM BLD-SCNC: 143 MMOL/L (ref 137–145)
WBC NRBC COR # BLD: 4.95 10*3/MM3 (ref 4.8–10.8)

## 2017-08-01 PROCEDURE — 84100 ASSAY OF PHOSPHORUS: CPT | Performed by: INTERNAL MEDICINE

## 2017-08-01 PROCEDURE — 80053 COMPREHEN METABOLIC PANEL: CPT | Performed by: INTERNAL MEDICINE

## 2017-08-01 PROCEDURE — 25010000002 LEVOFLOXACIN PER 250 MG: Performed by: INTERNAL MEDICINE

## 2017-08-01 PROCEDURE — 94799 UNLISTED PULMONARY SVC/PX: CPT

## 2017-08-01 PROCEDURE — 99232 SBSQ HOSP IP/OBS MODERATE 35: CPT | Performed by: INTERNAL MEDICINE

## 2017-08-01 PROCEDURE — 85027 COMPLETE CBC AUTOMATED: CPT | Performed by: INTERNAL MEDICINE

## 2017-08-01 RX ORDER — ASPIRIN 81 MG/1
81 TABLET ORAL DAILY
Status: DISCONTINUED | OUTPATIENT
Start: 2017-08-01 | End: 2017-08-02 | Stop reason: HOSPADM

## 2017-08-01 RX ADMIN — SOTALOL HYDROCHLORIDE 80 MG: 80 TABLET ORAL at 08:47

## 2017-08-01 RX ADMIN — MONTELUKAST SODIUM 10 MG: 10 TABLET, FILM COATED ORAL at 08:48

## 2017-08-01 RX ADMIN — Medication 1000 UNITS: at 09:53

## 2017-08-01 RX ADMIN — OYSTER SHELL CALCIUM WITH VITAMIN D 1 TABLET: 500; 200 TABLET, FILM COATED ORAL at 08:47

## 2017-08-01 RX ADMIN — CALCIUM POLYCARBOPHIL 625 MG: 625 TABLET, FILM COATED ORAL at 08:48

## 2017-08-01 RX ADMIN — ESCITALOPRAM OXALATE 20 MG: 20 TABLET ORAL at 08:47

## 2017-08-01 RX ADMIN — ATORVASTATIN CALCIUM 40 MG: 40 TABLET, FILM COATED ORAL at 21:50

## 2017-08-01 RX ADMIN — IPRATROPIUM BROMIDE AND ALBUTEROL SULFATE 3 ML: .5; 3 SOLUTION RESPIRATORY (INHALATION) at 12:22

## 2017-08-01 RX ADMIN — SOTALOL HYDROCHLORIDE 80 MG: 80 TABLET ORAL at 17:33

## 2017-08-01 RX ADMIN — IPRATROPIUM BROMIDE AND ALBUTEROL SULFATE 3 ML: .5; 3 SOLUTION RESPIRATORY (INHALATION) at 19:19

## 2017-08-01 RX ADMIN — ASPIRIN 81 MG: 81 TABLET, COATED ORAL at 16:21

## 2017-08-01 RX ADMIN — Medication 1 CAPSULE: at 08:47

## 2017-08-01 RX ADMIN — FLUTICASONE PROPIONATE 2 SPRAY: 50 SPRAY, METERED NASAL at 09:00

## 2017-08-01 RX ADMIN — IPRATROPIUM BROMIDE AND ALBUTEROL SULFATE 3 ML: .5; 3 SOLUTION RESPIRATORY (INHALATION) at 06:59

## 2017-08-01 RX ADMIN — METRONIDAZOLE 500 MG: 500 INJECTION, SOLUTION INTRAVENOUS at 16:21

## 2017-08-01 RX ADMIN — LEVOFLOXACIN 750 MG: 5 INJECTION, SOLUTION INTRAVENOUS at 00:33

## 2017-08-01 RX ADMIN — DONEPEZIL HYDROCHLORIDE 10 MG: 5 TABLET, FILM COATED ORAL at 21:51

## 2017-08-01 RX ADMIN — APIXABAN 5 MG: 2.5 TABLET, FILM COATED ORAL at 21:50

## 2017-08-01 RX ADMIN — AMLODIPINE BESYLATE 10 MG: 5 TABLET ORAL at 21:50

## 2017-08-01 RX ADMIN — CARBIDOPA AND LEVODOPA 1 TABLET: 25; 100 TABLET ORAL at 21:50

## 2017-08-01 RX ADMIN — METRONIDAZOLE 500 MG: 500 INJECTION, SOLUTION INTRAVENOUS at 08:47

## 2017-08-01 RX ADMIN — ACETAMINOPHEN 650 MG: 325 TABLET, FILM COATED ORAL at 13:11

## 2017-08-01 RX ADMIN — CYANOCOBALAMIN TAB 500 MCG 1000 MCG: 500 TAB at 08:47

## 2017-08-01 RX ADMIN — PANTOPRAZOLE SODIUM 40 MG: 40 TABLET, DELAYED RELEASE ORAL at 08:51

## 2017-08-01 NOTE — PROGRESS NOTES
"Hospitalist Progress Note.    LOS: 2 days    Patient Care Team:  Marilyn K Vermeesch, MD as PCP - General  Marilyn K Vermeesch, MD as PCP - Family Medicine  Marilyn K Vermeesch, MD as PCP - Claims Attributed    Chief Complaint:    Chief Complaint   Patient presents with   • Rectal Bleeding       Subjective      Patient seen and examined this morning.  Events from last night noted.  Patient denies having any fevers chills.  No nausea or vomiting no abdominal pain. She still have episodes of diarrhea.  Denies any chest pain shortness of breath cough or sputum production.  There is no significant edema.   Patient also denies having new onset weakness of numbness of either extremity.       Review of Systems:    The pertinent  ROS was done and it is noted above, rest  was negative.    Objective     Vital Signs  /63 (BP Location: Right arm, Patient Position: Lying)  Pulse 54  Temp 98.6 °F (37 °C) (Oral)   Resp 14  Ht 67.5\" (171.5 cm)  Wt 207 lb 14.3 oz (94.3 kg)  SpO2 92%  BMI 32.08 kg/m2      I/O this shift:  In: 1420 [P.O.:1420]  Out: -     Intake/Output Summary (Last 24 hours) at 08/01/17 1501  Last data filed at 08/01/17 1400   Gross per 24 hour   Intake          7545.33 ml   Output                0 ml   Net          7545.33 ml       Physical Exam:    General Appearance: alert, oriented x 3, no acute distress,   HEENT: pupils round and reactive to light, oral mucosa dry, extra occular movements intact.  Neck: supple, no JVD, trachea midline  Lungs:Chest shape is normal. Breath sounds heard bilaterally equally. No crackles, No wheezing. No Pleural rub or bronchial breathing.  Heart: RRR, normal S1 and S2, no S3, no rub  Abdomen: soft, non-tender, no palpable bladder, present bowel sounds to auscultation  Extremities: no edema, cyanosis or clubbing.   Neuro: normal speech and mental status, grossly non focal.     Results Review:      Results from last 7 days  Lab Units 08/01/17  0514 07/31/17  0526 " 07/30/17 1949 07/28/17  1119   SODIUM mmol/L 143 143 146* 143   POTASSIUM mmol/L 3.7 3.4* 3.5 3.7   CHLORIDE mmol/L 106 107 108* 104   CO2 mmol/L 27.0 27.0 27.0 29.0   BUN mg/dL 5* 10 12 11   CREATININE mg/dL 0.60 0.60 1.00 0.70   CALCIUM mg/dL 9.0 8.5 9.1 8.9   BILIRUBIN mg/dL 0.5  --  0.4 0.8   ALK PHOS U/L 65  --  68 66   ALT (SGPT) U/L 27  --  30 35   AST (SGOT) U/L 21  --  22 22   GLUCOSE mg/dL 107* 120* 106* 108*       Estimated Creatinine Clearance: 83.5 mL/min (by C-G formula based on Cr of 0.6).      Results from last 7 days  Lab Units 08/01/17  0514 07/31/17  0526   MAGNESIUM mg/dL  --  1.8   PHOSPHORUS mg/dL 3.2 3.3               Results from last 7 days  Lab Units 08/01/17 0514 07/31/17 0526 07/30/17 1949 07/28/17  1119   WBC 10*3/mm3 4.95 4.13* 4.99 5.91   HEMOGLOBIN g/dL 11.9* 11.1* 11.6* 12.7   PLATELETS 10*3/mm3 161 143 161 178         Results from last 7 days  Lab Units 07/30/17 1949   INR  1.20*         Imaging Results (last 24 hours)     ** No results found for the last 24 hours. **          amLODIPine 10 mg Oral Nightly   atorvastatin 40 mg Oral Nightly   calcium-vitamin D 1 tablet Oral Daily   carbidopa-levodopa 1 tablet Oral Nightly   donepezil 10 mg Oral Nightly   escitalopram 20 mg Oral Daily   fluticasone 2 spray Nasal Daily   ipratropium-albuterol 3 mL Nebulization Q6H - RT   lactobacillus acidophilus 1 capsule Oral Daily   levoFLOXacin 750 mg Intravenous Q24H   metroNIDAZOLE 500 mg Intravenous Q8H   montelukast 10 mg Oral Daily   pantoprazole 40 mg Oral Daily   polycarbophil 625 mg Oral Daily   sotalol 80 mg Oral BID   vitamin B-12 1,000 mcg Oral Daily   vitamin E 1,000 Units Oral Daily       Pharmacy Consult     sodium chloride 0.9 % with KCl 20 mEq 100 mL/hr Last Rate: 100 mL/hr (07/31/17 2053)       Medication Review:   Current Facility-Administered Medications   Medication Dose Route Frequency Provider Last Rate Last Dose   • acetaminophen (TYLENOL) tablet 650 mg  650 mg Oral Q4H  PRN Jay Jay Cuellar MD   650 mg at 08/01/17 1311   • amLODIPine (NORVASC) tablet 10 mg  10 mg Oral Nightly Jay Jay Cuellar MD   10 mg at 07/31/17 2039   • atorvastatin (LIPITOR) tablet 40 mg  40 mg Oral Nightly Jay Jay Cuellar MD   40 mg at 07/31/17 2039   • calcium-vitamin D 500-200 MG-UNIT per tablet 1 tablet  1 tablet Oral Daily Jay Jay Cuellar MD   1 tablet at 08/01/17 0847   • carbidopa-levodopa (SINEMET)  MG per tablet 1 tablet  1 tablet Oral Nightly Jay Jay Cuellar MD   1 tablet at 07/31/17 2039   • donepezil (ARICEPT) tablet 10 mg  10 mg Oral Nightly Jay Jay Cuellar MD   10 mg at 07/31/17 2038   • escitalopram (LEXAPRO) tablet 20 mg  20 mg Oral Daily Jay Jay Cuellar MD   20 mg at 08/01/17 0847   • fluticasone (FLONASE) 50 MCG/ACT nasal spray 2 spray  2 spray Nasal Daily Jay Jay Cuellar MD   2 spray at 08/01/17 0900   • ipratropium-albuterol (DUO-NEB) nebulizer solution 3 mL  3 mL Nebulization Q6H - RT Jay Jay Cuellar MD   3 mL at 08/01/17 1222   • lactobacillus acidophilus (RISAQUAD) capsule 1 capsule  1 capsule Oral Daily Jay Jay Cuellar MD   1 capsule at 08/01/17 0847   • levoFLOXacin (LEVAQUIN) 750 mg/150 mL D5W (premix) (LEVAQUIN) 750 mg  750 mg Intravenous Q24H Jay Jay Cuellar MD   750 mg at 08/01/17 0033   • metroNIDAZOLE (FLAGYL) IVPB 500 mg  500 mg Intravenous Q8H Jay Jay Cuellar MD   500 mg at 08/01/17 0847   • montelukast (SINGULAIR) tablet 10 mg  10 mg Oral Daily Jay Jay Cuellar MD   10 mg at 08/01/17 0848   • morphine injection 1 mg  1 mg Intravenous Q4H PRN Jay Jay Cuellar MD       • morphine injection 2 mg  2 mg Intravenous Q4H PRN Jay Jay Cuellar MD        And   • naloxone (NARCAN) injection 0.4 mg  0.4 mg Intravenous Q5 Min PRN Jay Jay Cuellar MD       • ondansetron (ZOFRAN) injection 4 mg  4 mg Intravenous Q6H PRN Jay Jay Cuellar MD       • pantoprazole (PROTONIX) EC tablet 40 mg  40 mg Oral Daily Jay Jay Cuellar MD   40 mg at 08/01/17 0851   •  Pharmacy Consult   Does not apply Continuous PRN Jay Jay Cuellar MD       • polycarbophil tablet 625 mg  625 mg Oral Daily Jay Jay Cuellar MD   625 mg at 08/01/17 0848   • sodium chloride 0.9 % flush 1-10 mL  1-10 mL Intravenous PRN Jay Jay Cuellar MD       • sodium chloride 0.9 % flush 10 mL  10 mL Intravenous PRN Jim Cates MD       • sodium chloride 0.9 % with KCl 20 mEq/L infusion  100 mL/hr Intravenous Continuous Severiano Calloway  mL/hr at 07/31/17 2053 100 mL/hr at 07/31/17 2053   • sotalol (BETAPACE) tablet 80 mg  80 mg Oral BID Jay Jay Cuellar MD   80 mg at 08/01/17 0847   • vitamin B-12 (CYANOCOBALAMIN) tablet 1,000 mcg  1,000 mcg Oral Daily Jay Jay Cuellar MD   1,000 mcg at 08/01/17 0847   • vitamin E capsule 1,000 Units  1,000 Units Oral Daily Jay Jay Cuellar MD   1,000 Units at 08/01/17 0953       Assessment/Plan     1.   Colitis: Continue current treatment and Dr. Calloway has been consulted, continue the antibiotics for another day and if she is stable can be discharged in the morning.  Dr. Calloway has suggested lactose free diet.  She does not have any signs of rectal bleed and/or other GI bleed issues.  We'll go ahead and restart her home medications.  2.   Hypokalemia: Resolved  3.   Dementia: Stable  4.   Depression : Continue home medications  5.   Hypertension: It is under fairly good control with current medications will be continued.  6.   Obstructive sleep apnea syndrome: Treated  7.   Paroxysmal atrial fibrillation: Rate controlled on Eliquis hold until GI evaluation.  8.   COPD (chronic obstructive pulmonary disease): Treated  9.   GERD (gastroesophageal reflux disease): Continue home medications  10.   Restless legs syndrome: Stable with home medications  11.   Chronic fatigue: Treated      Details were discussed with the patient as well as family in the room.   I also discussed the details with the nursing staff.    Rest as ordered.    Eloy Escobar MD  08/01/17  3:01  PM    Please note that portions of this note may have been completed with a voice recognition program. Efforts were made to edit the dictations, but occasionally words are mistranscribed.

## 2017-08-01 NOTE — PLAN OF CARE
Problem: Patient Care Overview (Adult)  Goal: Plan of Care Review  Outcome: Ongoing (interventions implemented as appropriate)    08/01/17 1701   Coping/Psychosocial Response Interventions   Plan Of Care Reviewed With patient   Patient Care Overview   Progress improving   Outcome Evaluation   Outcome Summary/Follow up Plan pt xiomy solid food less diaarhea states she is much better tely sb to sr denies pain up in room

## 2017-08-01 NOTE — PLAN OF CARE
Problem: Patient Care Overview (Adult)  Goal: Plan of Care Review  Outcome: Ongoing (interventions implemented as appropriate)    08/01/17 0255   Coping/Psychosocial Response Interventions   Plan Of Care Reviewed With patient   Patient Care Overview   Progress improving   Outcome Evaluation   Outcome Summary/Follow up Plan Patient denies diarrhea today, no abdominal tenderness, and no nausea. states she is feeling better.       Goal: Adult Individualization and Mutuality  Outcome: Ongoing (interventions implemented as appropriate)    08/01/17 0255   Individualization   Patient Specific Interventions continued fluids and antibiotics        Goal: Discharge Needs Assessment  Outcome: Ongoing (interventions implemented as appropriate)    07/30/17 2326 07/31/17 0853 08/01/17 0255   Discharge Needs Assessment   Concerns To Be Addressed --  --  no discharge needs identified   Readmission Within The Last 30 Days --  no previous admission in last 30 days --    Equipment Needed After Discharge --  --  none   Discharge Disposition --  --  still a patient   Current Health   Anticipated Changes Related to Illness --  --  none   Self-Care   Equipment Currently Used at Home --  --  none   Living Environment   Transportation Available family or friend will provide --  --          Problem: Bowel Disease, Inflammatory (Adult)  Goal: Signs and Symptoms of Listed Potential Problems Will be Absent or Manageable (Bowel Disease, Inflammatory)  Outcome: Ongoing (interventions implemented as appropriate)    08/01/17 0255   Bowel Disease, Inflammatory   Problems Assessed (Inflammatory Bowel Disease) pain;diarrhea;situational response   Problems Present (Inflammatory Bowel Disease) none

## 2017-08-01 NOTE — PROGRESS NOTES
"  SUBJECTIVE:  Overall feels better.  Diarrhea has improved.  Last night the patient did not have a bowel movement.  However since this morning she had 2 loose stools.  The patient has an urge to go after eating.  Stools are getting somewhat deformed.  There is no further bright red blood per rectum.  She denies abdominal pain.  There is no nausea or vomiting.  There is no hematemesis or melena.    UNDERLYING CHRONIC ILLNESS:  Hypertension.    REVIEW OF SYSTEMS:    Constitutional: No fever or chills.  CNS: No seizures or stroke.  Cardiovascular: No chest pains.  No palpitations.  Pulmonary: No shortness of breath.  No cough.  Liver: No jaundice.  Rheumatologic: No joint pains.  Skin: No skin rash.  Renal: Making urine.  Denies hematuria or dysuria.  Psychiatric: Denies depression or anxiety.  Nutrition: Clear liquid diet.  Activities: The patient has walked to the bathroom.    OBJECTIVE:  Alert and oriented ×3.  No apparent distress.    Vital signs:    Blood pressure 145/78, pulse 52, temperature 98.6 °F (37 °C), temperature source Oral, resp. rate 16, height 67.5\" (171.5 cm), weight 207 lb 14.3 oz (94.3 kg), SpO2 96 %.    HEENT: No icterus.  No pallor.  Oral mucosa moist.  Neck: Supple.  Chest: Clear to auscultation.  Cardiovascular: No S3 no murmurs.  Abdomen: Soft.  Non distended.  Non tender.  No hepatosplenomegaly.  No mass.  No ascites.Bowel sounds present.  Extremities: No edema.  Rectal: Deferred.  Stigmata of chronic liver disease: None.  Neurological: No focal motor neurological deficit.  Rheumatologic: No obvious joint swelling.    Laboratory Tests:    Upon review of medical records:     Dated April 26, 2017 H&H 13.1 and 40.2.  Platelet count 175.  Dated July 28, 2017 H&H 12.7 and 38.6.  Dated July 30, 2017 H&H 11.6 and 35.4 platelet count 161.     Dated 7/31/2017 sodium 143 potassium 3.4 chloride 107 CO2 27 BUN and creatinine 0.6 and glucose 120.  Troponin 1 less than 0.012.  Phosphorus 3.3.  Magnesium " 1.8.       White count 4.13 hemoglobin 11.1 hematocrit 33.3 and platelet count 143.  MCV 92.5.    UA revealed nitrite negative blood negative leukocyte esterase trace positive.    Stool for C. difficile toxin assay by EIA negative.  Stool for WBCs negative.    Dated August 1, 2017 sodium 143 potassium 3.7 chloride 106 CO2 27 BUN 5 creatinine 0.6 and glucose 107.  Alkaline phosphatase 65 ALT 27 AST 20 1T bili 0.5 phosphorus 3.2.  White count 4.95 hemoglobin 11.9 hematocrit 36.6 and platelet count 161K.     Abdominal Imaging:    Upon review of medical records:     Dated July 30, 2017 CT of abdomen and pelvis with contrast revealed by basal lower lobe atelectasis.  Liver with low attenuation which may suggest fatty liver.  Gallbladder contracted.  Spleen pancreas and adrenals and kidneys unremarkable.  Some renal vascular calcifications.  Aorta and celiac arteries are calcified.  No aneurysms.  Bowel gas pattern nonobstructive.  Scattered colonic diverticulosis without evidence of diverticulitis.  There is wall thickening of the transverse colon suggestive of colitis.  There is no evidence of appendicitis.  Bladder is normal in contour.     Assessment:    1. Recurrent diarrhea associated with tenesmus, and fecal incontinence.  Possible infectious.  Less likely inflammatory bowel disease is among the differential.  2. Recurrent hematochezia.  3. Anemia.  Drop in H&H likely secondary to lower GI bleed.  4. Hypokalemia.  Improved.  5. Epigastric abdominal tenderness likely secondary to transverse colitis.  Improved.  6. CT suggestion of transverse colitis.  7. Follow-up H&H  8. Overall picture is not consistent with ischemic colitis.      Recommendations:    1. Hold apixaban  2. May advance to low lactose-low-fat diet.    3. Await stool culture.  4. If the patient tolerates the diet and continues to do well she'll might be able to go home.  5. Continue with Cipro and Flagyl for a total of 5 days since the start.   6. The  patient may take probiotics.  7. If discharged follow up in office in 1-2 weeks.  8. Discussed with the patient and her daughter in detail.  Opportunity was given to ask questions.    9. Discussed with the nursing staff as well.

## 2017-08-01 NOTE — PROGRESS NOTES
"Adult Nutrition  Assessment/PES    Patient Name:  Nery Garcia  YOB: 1950  MRN: [id number]  Admit Date:  7/30/2017    Assessment Date:  8/1/2017        Reason for Assessment       08/01/17 1058    Reason for Assessment    Reason For Assessment/Visit diagnosis/disease state    Identified At Risk By Screening Criteria BMI    Diagnosis Diagnosis    Cardiac CAD;Other (comment);HTN;Dyslipidemia   A-Fib    Gastrointestinal GERD/Reflux;Colitis    Hepatic Fatty liver    Neurological Dementia    Pulmonary/Critical Care Obstructive sleep apnea                Anthropometrics       08/01/17 1100    Anthropometrics    Height Method Stated    Height 171.5 cm (67.5\")    Weight Method Bed scale    Ideal Body Weight (IBW)    Ideal Body Weight (IBW), Female 63.4    Body Mass Index (BMI)    BMI Grade 30 - 34.9- obesity - grade I            Labs/Tests/Procedures/Meds       08/01/17 1100    Labs/Tests/Procedures/Meds    Labs/Tests Review Reviewed   Low: BUN   High: Glu    Medication Review Reviewed, pertinent;Antibiotic              Estimated/Assessed Needs       08/01/17 1101    Calculation Measurements    Weight Used For Calculations 70.8 kg (156 lb)   ABW    Height Used for Calculations 1.715 m (5' 7.5\")    Estimated/Assessed Energy Needs    Energy Need Method [employer]    Age 66    RMR ([place] Equation) 1293.17    Activity Factors ([place] [employer])  Confined to bed  1.2    Estimated Kcal Range  ~8735-5888 kcal    Estimated/Assessed Protein Needs    Weight Used for Protein Calculation 70.8 kg (156 lb)    Protein (gm/kg) 1.2    1.2 Gm Protein (gm) 84.91    Estimated Protein Range ~71-85 gm    Estimated/Assessed Fluid Needs    Fluid Need Method RDA method    RDA Method (mL)  1850            Nutrition Prescription Ordered       08/01/17 1103    Nutrition Prescription PO    Current PO Diet Clear Liquid            Evaluation of Received Nutrient/Fluid Intake       08/01/17 1101    PO Evaluation    " Number of Days PO Intake Evaluated 2 days    Number of Meals 4    % PO Intake 41.75              Problem/Interventions:        Problem 1       08/01/17 1105    Nutrition Diagnoses Problem 1    Problem 1 Inadequate Intake/Infusion    Inadequate Intake Type Oral    Macronutrient Kcal;Fluid;Protein    Micronutrient Vitamin;Mineral    Etiology (related to) MNT for Treatment/Condition    Signs/Symptoms (evidenced by) Clear Liquid Diet            Problem 2       08/01/17 1106    Nutrition Diagnoses Problem 2    Problem 2 Altered GI Function    Etiology (related to) Medical Diagnosis    Gastrointestinal Colitis    Signs/Symptoms (evidenced by) Report/Observation    Reported GI Symptoms Diarrhea                  Intervention Goal       08/01/17 1107    Intervention Goal    General Meet nutritional needs for age/condition    PO Meet estimated needs;Increase intake;PO intake (%);Advance diet    PO Intake % 75 %    Weight No significant weight loss            Nutrition Intervention       08/01/17 1107    Nutrition Intervention    RD/Tech Action Care plan reviewd;Follow Tx progress;Encourage intake;Recommend/ordered    Recommended/Ordered Diet;Supplement            Nutrition Prescription       08/01/17 1108    Nutrition Prescription PO    PO Prescription Begin/change diet;Begin/change supplement    Begin/Change Diet to Regular    Supplement Ensure Clear    Supplement Frequency 2 times a day    Other Modifiers Low fiber    New PO Prescription Ordered? Yes    Other Orders    Obtain Weight Daily    Obtain Weight Ordered? No, recommended    Supplement Vitamin mineral supplement    Supplement Ordered? No, recommended            Education/Evaluation       08/01/17 1109    Education    Education Will Instruct as appropriate    Monitor/Evaluation    Monitor Per protocol;I&O;PO intake;Supplement intake;Pertinent labs        Comments:  Rec #1: Continue current diet order; Encouraging intake and advancing as medically appropriate. Pt may  benefit from a low fiber diet modification once diet is advanced. Pt receiving 41.75% PO intake over four meals of clear liquid. Nutritional supplement ordered Ensure Clear BID to promote PO intake. Rec #2: Consider MVI with minerals daily. Rec #3: Continue to monitor/replace electrolytes PRN. RD to follow pt. Consult RD PRN.       Electronically signed by:  Katie Mc RD  08/01/17 11:10 AM

## 2017-08-01 NOTE — PLAN OF CARE
Problem: Bowel Disease, Inflammatory (Adult)  Goal: Signs and Symptoms of Listed Potential Problems Will be Absent or Manageable (Bowel Disease, Inflammatory)  Outcome: Ongoing (interventions implemented as appropriate)    08/01/17 1701   Bowel Disease, Inflammatory   Problems Assessed (Inflammatory Bowel Disease) diarrhea;fluid imbalance   Problems Present (Inflammatory Bowel Disease) diarrhea;fluid imbalance

## 2017-08-02 VITALS
OXYGEN SATURATION: 94 % | DIASTOLIC BLOOD PRESSURE: 61 MMHG | WEIGHT: 203.1 LBS | BODY MASS INDEX: 30.78 KG/M2 | TEMPERATURE: 97.9 F | HEIGHT: 68 IN | RESPIRATION RATE: 16 BRPM | HEART RATE: 57 BPM | SYSTOLIC BLOOD PRESSURE: 129 MMHG

## 2017-08-02 PROCEDURE — 94799 UNLISTED PULMONARY SVC/PX: CPT

## 2017-08-02 PROCEDURE — 25010000002 LEVOFLOXACIN PER 250 MG: Performed by: INTERNAL MEDICINE

## 2017-08-02 PROCEDURE — 99217 PR OBSERVATION CARE DISCHARGE MANAGEMENT: CPT | Performed by: INTERNAL MEDICINE

## 2017-08-02 RX ORDER — METRONIDAZOLE 500 MG/1
500 TABLET ORAL 3 TIMES DAILY
Qty: 30 TABLET | Refills: 0 | Status: SHIPPED | OUTPATIENT
Start: 2017-08-02 | End: 2017-08-12

## 2017-08-02 RX ORDER — LEVOFLOXACIN 500 MG/1
500 TABLET, FILM COATED ORAL EVERY 24 HOURS
Status: DISCONTINUED | OUTPATIENT
Start: 2017-08-02 | End: 2017-08-02 | Stop reason: HOSPADM

## 2017-08-02 RX ORDER — METRONIDAZOLE 500 MG/1
500 TABLET ORAL EVERY 8 HOURS SCHEDULED
Status: DISCONTINUED | OUTPATIENT
Start: 2017-08-02 | End: 2017-08-02 | Stop reason: HOSPADM

## 2017-08-02 RX ORDER — LEVOFLOXACIN 500 MG/1
500 TABLET, FILM COATED ORAL EVERY 24 HOURS
Qty: 10 TABLET | Refills: 0 | Status: SHIPPED | OUTPATIENT
Start: 2017-08-02 | End: 2017-08-14

## 2017-08-02 RX ADMIN — ASPIRIN 81 MG: 81 TABLET, COATED ORAL at 08:26

## 2017-08-02 RX ADMIN — ACETAMINOPHEN 650 MG: 325 TABLET, FILM COATED ORAL at 08:26

## 2017-08-02 RX ADMIN — Medication 1 CAPSULE: at 08:22

## 2017-08-02 RX ADMIN — METRONIDAZOLE 500 MG: 500 INJECTION, SOLUTION INTRAVENOUS at 08:22

## 2017-08-02 RX ADMIN — SOTALOL HYDROCHLORIDE 80 MG: 80 TABLET ORAL at 08:22

## 2017-08-02 RX ADMIN — PANTOPRAZOLE SODIUM 40 MG: 40 TABLET, DELAYED RELEASE ORAL at 08:22

## 2017-08-02 RX ADMIN — OYSTER SHELL CALCIUM WITH VITAMIN D 1 TABLET: 500; 200 TABLET, FILM COATED ORAL at 08:22

## 2017-08-02 RX ADMIN — CALCIUM POLYCARBOPHIL 625 MG: 625 TABLET, FILM COATED ORAL at 08:23

## 2017-08-02 RX ADMIN — CYANOCOBALAMIN TAB 500 MCG 1000 MCG: 500 TAB at 08:22

## 2017-08-02 RX ADMIN — METRONIDAZOLE 500 MG: 500 INJECTION, SOLUTION INTRAVENOUS at 00:19

## 2017-08-02 RX ADMIN — Medication 1000 UNITS: at 08:22

## 2017-08-02 RX ADMIN — ESCITALOPRAM OXALATE 20 MG: 20 TABLET ORAL at 08:22

## 2017-08-02 RX ADMIN — IPRATROPIUM BROMIDE AND ALBUTEROL SULFATE 3 ML: .5; 3 SOLUTION RESPIRATORY (INHALATION) at 06:53

## 2017-08-02 RX ADMIN — APIXABAN 5 MG: 2.5 TABLET, FILM COATED ORAL at 08:22

## 2017-08-02 RX ADMIN — MONTELUKAST SODIUM 10 MG: 10 TABLET, FILM COATED ORAL at 08:22

## 2017-08-02 RX ADMIN — LEVOFLOXACIN 750 MG: 5 INJECTION, SOLUTION INTRAVENOUS at 00:18

## 2017-08-02 NOTE — DISCHARGE SUMMARY
[facility]   DISCHARGE SUMMARY      Name:  Nery Garcia   Age:  66 y.o.  Sex:  female  :  1950  MRN:  [id number]   Visit Number:  [id number]  Primary Care Physician:  Marilyn K. Vermeesch, MD  Date of Discharge:  2017  Admission Date:  2017      Discharge Diagnosis:       Principal Problem:    Colitis  Active Problems:    Dementia    Depression    Hypertension    Obstructive sleep apnea syndrome    Paroxysmal atrial fibrillation    COPD (chronic obstructive pulmonary disease)    GERD (gastroesophageal reflux disease)    Restless legs syndrome    Chronic fatigue          Consults:   Consulting Physician(s)     Provider Relationship    Severiano Calloway MD Consulting Physician        Consults     Date and Time Order Name Status Description    2017 1140 Inpatient Consult to Gastroenterology            Procedures Performed:    None           Hospital Course:   The patient was admitted on 2017  Please see H&P for details on admission HPI and ROS.    Patient is a 65 yo female recently treated for scabies with a pmhx of Dementia, COPD, PE in , Afib on Eliquis, PUD per EGD in , CAD/HT/HLD, obesity, fatty liver,GERD, depression admitted for watery diarrhea for the past week. She reports in the past 24 hours has noted bright blood when she wipes.  She reports however no abdominal pain, n/v, dizziness, soa, or cp.  She has been having approximately 3-5 episodes of diarrhea per day.  Her HGB currently is stable at 11.6, MCV is 93, FOBT +.  Will hold her ASA 81 mg po daily and Xarelto for now, restart once deemed appropriate. CT abdomen/pelvis showed transverse colitis.  Place patient on clears and treat with IV Levaquin/Flagyl. Cdiff/Stool cultures/Fecal Leukocytes pending.    Patient starting to feel better and did not have any more bleeding while in the hospital.  She was taken off aspirin as well as Eliquis, Dr. Calloway has seen patient about 10 years ago he  was consulted for further evaluation and treatment.  He recommended lactose-free diet patient tolerated it well and did feel there was some improvement in her diarrhea.  She did not bleed any at all while in the hospital.  She was restarted on these medicines the day before discharge.  Instructions were given to the daughter if she has any bleeding she can stop the 2 medications and follow-up with her primary care as well as Dr. Calloway.  Follow-up appointments with primary care is in one week and Dr. Calloway is 3 weeks.    She will continue her Levaquin 500 once a day as well as Flagyl 500 mg 3 times a day for another 10 days.    Vital Signs:    Temp:  [97.6 °F (36.4 °C)-98.6 °F (37 °C)] 97.9 °F (36.6 °C)  Heart Rate:  [50-76] 57  Resp:  [14-18] 16  BP: (129-145)/(59-78) 129/61            Physical Exam:   General Appearance: alert, oriented x 3, no acute distress,   HEENT: pupils round and reactive to light, oral mucosa dry, extra occular movements intact.  Neck: supple, no JVD, trachea midline  Lungs: Clear to Auscultation, unlabored breathing effort  Heart: RRR, normal S1 and S2, no S3, no rub  Abdomen: soft, non-tender, no palpable bladder, present bowel sounds to auscultation  Extremities: no edema, cyanosis or clubbing.   Neuro: normal speech and mental status, grossly non focal.    Pertinent Lab Results:       Results from last 7 days  Lab Units 08/01/17  0514 07/31/17  0526 07/30/17  1949 07/28/17  1119   SODIUM mmol/L 143 143 146* 143   POTASSIUM mmol/L 3.7 3.4* 3.5 3.7   CHLORIDE mmol/L 106 107 108* 104   CO2 mmol/L 27.0 27.0 27.0 29.0   BUN mg/dL 5* 10 12 11   CREATININE mg/dL 0.60 0.60 1.00 0.70   CALCIUM mg/dL 9.0 8.5 9.1 8.9   BILIRUBIN mg/dL 0.5  --  0.4 0.8   ALK PHOS U/L 65  --  68 66   ALT (SGPT) U/L 27  --  30 35   AST (SGOT) U/L 21  --  22 22   GLUCOSE mg/dL 107* 120* 106* 108*       Results from last 7 days  Lab Units 08/01/17  0514 07/31/17  0526 07/30/17  1949   WBC 10*3/mm3 4.95 4.13* 4.99    HEMOGLOBIN g/dL 11.9* 11.1* 11.6*   HEMATOCRIT % 36.6* 33.3* 35.4*   PLATELETS 10*3/mm3 161 143 161       Results from last 7 days  Lab Units 07/30/17  1949   INR  1.20*          Pertinent Radiology Results:  Imaging Results (most recent)     Procedure Component Value Units Date/Time    CT Abdomen Pelvis With Contrast [[id number]] Collected:  07/30/17 2134     Updated:  07/30/17 2137    Narrative:       FINAL REPORT    TECHNIQUE:  Multiple contiguous transaxial slices through the abdomen and pelvis were obtained after the administration of contrast.    CLINICAL HISTORY:  DIARRHEA, RECTAL BLEEDING    FINDINGS:  There is bilateral lower lobe atelectasis. There is a small hiatal hernia. The liver is low-attenuation consistent with fatty infiltration. The gallbladder is contracted. The  spleen, pancreas, adrenal glands and kidneys are unremarkable. There are renal   calcifications, likely vascular. The aorta and iliac arteries are calcified without aneurysm. The bowel gas pattern is nonobstructive. There is scattered colonic diverticulosis without evidence for diverticulitis. There is wall thickening of the   transverse colon suggesting colitis. There is no evidence for appendicitis. The bladder is normal in contour.    Impression:    Transverse colitis    Diverticulosis      Impression:       Authenticated by Aimee Lorenz MD on 07/30/2017 09:34:21 PM                  Discharge Disposition:    Home or Self Care    Discharge Medication:     Nery Garcia   Home Medication Instructions NIRMALA:[id number]    Printed on:08/02/17 0951   Medication Information                      amLODIPine (NORVASC) 10 MG tablet  Take 1 tablet by mouth Daily.             apixaban (ELIQUIS) 5 MG tablet tablet  Take 1 tablet by mouth 2 (Two) Times a Day.             aspirin 81 MG tablet  Take 1 tablet by mouth daily.             atorvastatin (LIPITOR) 40 MG tablet  Take 1 tablet by mouth Daily.             calcium-vitamin D (OSCAL  500/200 D-3) 500-200 MG-UNIT per tablet  Take 1 tablet by mouth Daily.             carbidopa-levodopa (SINEMET)  MG per tablet  Take 1 tablet by mouth Every Night.             donepezil (ARICEPT) 10 MG tablet  Take 1 tablet by mouth every night.             escitalopram (LEXAPRO) 20 MG tablet  Take 20 mg by mouth Daily.             FIBER PO  Take 1 tablet by mouth Daily.             fluticasone (FLONASE) 50 MCG/ACT nasal spray  2 sprays into each nostril Daily As Needed for Allergies.             Lactobacillus (ACIDOPHILUS PROBIOTIC) 100 MG capsule  Take 1 capsule by mouth 3 (Three) Times a Day.             levoFLOXacin (LEVAQUIN) 500 MG tablet  Take 1 tablet by mouth Daily for 10 days             losartan (COZAAR) 100 MG tablet  Take 1 tablet by mouth Daily.             metroNIDAZOLE (FLAGYL) 500 MG tablet  Take 1 tablet by mouth 3 (Three) Times a Day for 10 days             montelukast (SINGULAIR) 10 MG tablet  Take 1 tablet by mouth Daily.             Multiple Vitamin (MULTI-DAY VITAMINS) tablet  Take 1 tablet by mouth daily.             pantoprazole (PROTONIX) 40 MG EC tablet  Take 1 tablet by mouth Daily.             permethrin (ELIMITE) 5 % cream  Apply head to toe and leave on for 8 hours overnight. Wash or rinse off and repeat same thing in days.             sotalol (BETAPACE) 80 MG tablet  Take 1 tablet by mouth 2 (Two) Times a Day.             Umeclidinium-Vilanterol (ANORO ELLIPTA) 62.5-25 MCG/INH aerosol powder   Inhale 1 puff Daily.             vitamin B-12 (CYANOCOBALAMIN) 1000 MCG tablet  Take 500 mcg by mouth Daily.             Vitamin E 400 UNITS tablet  Take 1,000 Units by mouth.                 Discharge Diet:     Diet Instructions     Diet: Regular; Thin Liquids, No Restrictions       Discharge Diet:  Regular   Fluid Consistency:  Thin Liquids, No Restrictions   Low residue lactose-free                     Follow-up Appointments:    Future Appointments  Date Time Provider Department Center    8/14/2017 9:15 AM Marilyn K Vermeesch, MD [facility] None   8/24/2017 3:15 PM Severiano Calloway MD [facility] None   9/20/2017 11:45 AM Mega Kendall MD [facility] None   10/6/2017 11:00 AM Marilyn K Vermeesch, MD [facility] None         Test Results Pending at Discharge:     Order Current Status    Stool Culture In process             Eloy Escobar MD  08/02/17  9:51 AM    Time: Discharge 25 min

## 2017-08-02 NOTE — PLAN OF CARE
Problem: Patient Care Overview (Adult)  Goal: Plan of Care Review  Outcome: Ongoing (interventions implemented as appropriate)    08/02/17 0600   Coping/Psychosocial Response Interventions   Plan Of Care Reviewed With patient   Patient Care Overview   Progress improving   Outcome Evaluation   Outcome Summary/Follow up Plan No diarrhea or nausea noted overnight.       Goal: Adult Individualization and Mutuality  Outcome: Ongoing (interventions implemented as appropriate)    08/02/17 0600   Individualization   Patient Specific Goals possible discharge on 8/2/17   Patient Specific Interventions antibiotics       Goal: Discharge Needs Assessment  Outcome: Ongoing (interventions implemented as appropriate)    Problem: Bowel Disease, Inflammatory (Adult)  Goal: Signs and Symptoms of Listed Potential Problems Will be Absent or Manageable (Bowel Disease, Inflammatory)  Outcome: Ongoing (interventions implemented as appropriate)    08/01/17 1701 08/02/17 0600   Bowel Disease, Inflammatory   Problems Assessed (Inflammatory Bowel Disease) diarrhea;fluid imbalance --    Problems Present (Inflammatory Bowel Disease) --  diarrhea

## 2017-08-03 LAB — BACTERIA SPEC AEROBE CULT: NORMAL

## 2017-08-04 ENCOUNTER — TRANSITIONAL CARE MANAGEMENT TELEPHONE ENCOUNTER (OUTPATIENT)
Dept: INTERNAL MEDICINE | Facility: CLINIC | Age: 67
End: 2017-08-04

## 2017-08-04 RX ORDER — ONDANSETRON 4 MG/1
4 TABLET, FILM COATED ORAL EVERY 8 HOURS PRN
Qty: 12 TABLET | Refills: 0 | Status: SHIPPED | OUTPATIENT
Start: 2017-08-04 | End: 2017-08-08

## 2017-08-04 NOTE — PROGRESS NOTES
I tried contacting patient to see if this was a new symptom and discuss plan of care but there was no answer. Please contact patient and inform her I sent zofran rx. Please ask her when the nausea started bc I did not see it documented in the ER report.

## 2017-08-14 ENCOUNTER — OFFICE VISIT (OUTPATIENT)
Dept: INTERNAL MEDICINE | Facility: CLINIC | Age: 67
End: 2017-08-14

## 2017-08-14 VITALS
HEIGHT: 68 IN | HEART RATE: 47 BPM | OXYGEN SATURATION: 96 % | BODY MASS INDEX: 30.08 KG/M2 | WEIGHT: 198.5 LBS | TEMPERATURE: 97.9 F | SYSTOLIC BLOOD PRESSURE: 118 MMHG | DIASTOLIC BLOOD PRESSURE: 62 MMHG

## 2017-08-14 DIAGNOSIS — K52.9 COLITIS: Primary | ICD-10-CM

## 2017-08-14 PROBLEM — R19.7 DIARRHEA OF PRESUMED INFECTIOUS ORIGIN: Status: RESOLVED | Noted: 2017-04-26 | Resolved: 2017-08-14

## 2017-08-14 LAB
ALBUMIN SERPL-MCNC: 4.1 G/DL (ref 3.5–5)
ALBUMIN/GLOB SERPL: 1.6 G/DL (ref 1–2)
ALP SERPL-CCNC: 53 U/L (ref 38–126)
ALT SERPL-CCNC: 35 U/L (ref 13–69)
AST SERPL-CCNC: 29 U/L (ref 15–46)
BASOPHILS # BLD AUTO: 0.04 10*3/MM3 (ref 0–0.2)
BASOPHILS NFR BLD AUTO: 0.6 % (ref 0–2.5)
BILIRUB SERPL-MCNC: 0.8 MG/DL (ref 0.2–1.3)
BUN SERPL-MCNC: 11 MG/DL (ref 7–20)
BUN/CREAT SERPL: 12.2 (ref 7.1–23.5)
CALCIUM SERPL-MCNC: 9.6 MG/DL (ref 8.4–10.2)
CHLORIDE SERPL-SCNC: 99 MMOL/L (ref 98–107)
CO2 SERPL-SCNC: 29 MMOL/L (ref 26–30)
CREAT SERPL-MCNC: 0.9 MG/DL (ref 0.6–1.3)
EOSINOPHIL # BLD AUTO: 0.13 10*3/MM3 (ref 0–0.7)
EOSINOPHIL NFR BLD AUTO: 2.1 % (ref 0–7)
ERYTHROCYTE [DISTWIDTH] IN BLOOD BY AUTOMATED COUNT: 13.4 % (ref 11.5–14.5)
GLOBULIN SER CALC-MCNC: 2.5 GM/DL
GLUCOSE SERPL-MCNC: 165 MG/DL (ref 74–98)
HCT VFR BLD AUTO: 40.2 % (ref 37–47)
HGB BLD-MCNC: 13.2 G/DL (ref 12–16)
IMM GRANULOCYTES # BLD: 0.03 10*3/MM3 (ref 0–0.06)
IMM GRANULOCYTES NFR BLD: 0.5 % (ref 0–0.6)
LYMPHOCYTES # BLD AUTO: 1.54 10*3/MM3 (ref 0.6–3.4)
LYMPHOCYTES NFR BLD AUTO: 24.5 % (ref 10–50)
MCH RBC QN AUTO: 30.8 PG (ref 27–31)
MCHC RBC AUTO-ENTMCNC: 32.8 G/DL (ref 30–37)
MCV RBC AUTO: 93.7 FL (ref 81–99)
MONOCYTES # BLD AUTO: 0.32 10*3/MM3 (ref 0–0.9)
MONOCYTES NFR BLD AUTO: 5.1 % (ref 0–12)
NEUTROPHILS # BLD AUTO: 4.23 10*3/MM3 (ref 2–6.9)
NEUTROPHILS NFR BLD AUTO: 67.2 % (ref 37–80)
NRBC BLD AUTO-RTO: 0 /100 WBC (ref 0–0)
PLATELET # BLD AUTO: 164 10*3/MM3 (ref 130–400)
POTASSIUM SERPL-SCNC: 4.9 MMOL/L (ref 3.5–5.1)
PROT SERPL-MCNC: 6.6 G/DL (ref 6.3–8.2)
RBC # BLD AUTO: 4.29 10*6/MM3 (ref 4.2–5.4)
SODIUM SERPL-SCNC: 140 MMOL/L (ref 137–145)
WBC # BLD AUTO: 6.29 10*3/MM3 (ref 4.8–10.8)

## 2017-08-14 PROCEDURE — 99495 TRANSJ CARE MGMT MOD F2F 14D: CPT | Performed by: INTERNAL MEDICINE

## 2017-08-14 NOTE — PROGRESS NOTES
Transitional Care Follow Up Visit  Subjective     Nery Garcia is a 66 y.o. female who presents for a transitional care management visit.    Within 48 business hours after discharge our office contacted her via telephone to coordinate her care and needs.      I reviewed and discussed the details of that call along with the discharge summary, hospital problems, inpatient lab results, inpatient diagnostic studies, and consultation reports with Nery.    Date of TCM Phone Call 8/4/2017   [facility]   Date of Admission 7/30/2017   Date of Discharge 8/2/2017   Risk for Readmission (LACE Score) 11   Discharge Disposition Home or Self Care       History of Present Illness   Course During Hospital Stay:  Pt presented to hospital with 1 week history of loose stools and also noted blood on toilet paper when wiping.  She had been having 3-5 episodes of loose stools daily.  She denied NV or abdominal pain.  CT of abdomen revealed transverse colitis.  She was admitted for this and started on IV levaquin and flagyl.   Her ASA and eliquis were held during her admission.  She slowly improved.   Dr Driscoll was consulted.  He agreed with above plan of care.  He also recommended a lactose free diet.  Her ASA and eliquis were resumed the day prior to discharge.  There was no further bleeding.   Pt has follow up appt with Dr Driscoll in 3 weeks.   C diff was negative. Stool cultures and stool white blood cells were also negative.    Since she got home she is still having some loose stools with every BM, no blood or mucous.  Not as often as when she was hospitalized.  She has completed her ABX.   She is complaining of a HA currently.  She is complaining of nausea. She has not taken anything for her HA.   Her daughter is concerned she is not eating or drinking enough.      The following portions of the patient's history were reviewed and updated as appropriate: allergies, current medications, past family history,  past medical history, past social history, past surgical history and problem list.    Review of Systems   Constitutional: Positive for fatigue. Negative for chills and fever.   Respiratory: Negative for shortness of breath.    Cardiovascular: Negative for chest pain, palpitations and leg swelling.   Gastrointestinal: Positive for nausea. Negative for vomiting.        Loose stools after meals   Neurological: Positive for headaches.   Psychiatric/Behavioral: Positive for dysphoric mood.       Objective   Physical Exam   Constitutional: She is oriented to person, place, and time. She appears well-developed and well-nourished.   HENT:   Head: Normocephalic and atraumatic.   Mouth/Throat: Oropharynx is clear and moist.   Eyes: Conjunctivae and EOM are normal. Pupils are equal, round, and reactive to light.   Neck: Normal range of motion. Neck supple. No thyromegaly present.   Cardiovascular: Normal rate, regular rhythm, normal heart sounds and intact distal pulses.    No murmur heard.  Pulmonary/Chest: Effort normal and breath sounds normal. No respiratory distress.   Abdominal: Soft. Bowel sounds are normal. She exhibits no distension and no mass. There is no tenderness. There is no rebound and no guarding.   Musculoskeletal: She exhibits no edema.   Lymphadenopathy:     She has no cervical adenopathy.   Neurological: She is alert and oriented to person, place, and time. No cranial nerve deficit.   Psychiatric: She has a normal mood and affect. Judgment normal.   Nursing note and vitals reviewed.      Assessment/Plan   Novel was seen today for transitional care management.    Diagnoses and all orders for this visit:    Colitis  -     CBC & Differential  -     Comprehensive Metabolic Panel    Labs as noted  Recommend increase fluids  Start align as directed  Advance to regular diet  Take Tylenol as needed for headaches  See Dr Driscoll as scheduled    RTC 2 weeks

## 2017-08-15 ENCOUNTER — TELEPHONE (OUTPATIENT)
Dept: INTERNAL MEDICINE | Facility: CLINIC | Age: 67
End: 2017-08-15

## 2017-08-15 NOTE — TELEPHONE ENCOUNTER
----- Message from Marilyn K Vermeesch, MD sent at 8/15/2017  7:36 AM EDT -----  Please call daughter or patient with results.  Her complete blood count is normal and she is no longer anemic.  All electrolytes as well as kidney and liver function are normal.

## 2017-08-15 NOTE — TELEPHONE ENCOUNTER
Notes Recorded by Adri Mckinley MA on 8/15/2017 at 8:40 AM  LEFT MESSAGE FOR PT/DAUGHTER TO CALL BACK  ------

## 2017-08-24 ENCOUNTER — OFFICE VISIT (OUTPATIENT)
Dept: GASTROENTEROLOGY | Facility: CLINIC | Age: 67
End: 2017-08-24

## 2017-08-24 ENCOUNTER — PREP FOR SURGERY (OUTPATIENT)
Dept: OTHER | Facility: HOSPITAL | Age: 67
End: 2017-08-24

## 2017-08-24 VITALS
WEIGHT: 199 LBS | SYSTOLIC BLOOD PRESSURE: 105 MMHG | DIASTOLIC BLOOD PRESSURE: 44 MMHG | HEART RATE: 51 BPM | BODY MASS INDEX: 30.16 KG/M2 | RESPIRATION RATE: 16 BRPM | HEIGHT: 68 IN | TEMPERATURE: 97 F

## 2017-08-24 DIAGNOSIS — D64.9 ANEMIA, UNSPECIFIED TYPE: ICD-10-CM

## 2017-08-24 DIAGNOSIS — R19.7 DIARRHEA, UNSPECIFIED TYPE: Primary | ICD-10-CM

## 2017-08-24 DIAGNOSIS — R10.816 EPIGASTRIC ABDOMINAL TENDERNESS, REBOUND TENDERNESS PRESENCE NOT SPECIFIED: ICD-10-CM

## 2017-08-24 DIAGNOSIS — K62.5 BRBPR (BRIGHT RED BLOOD PER RECTUM): ICD-10-CM

## 2017-08-24 DIAGNOSIS — K92.1 HEMATOCHEZIA: ICD-10-CM

## 2017-08-24 DIAGNOSIS — R12 HEARTBURN: ICD-10-CM

## 2017-08-24 DIAGNOSIS — K52.9 COLITIS: ICD-10-CM

## 2017-08-24 PROCEDURE — 99214 OFFICE O/P EST MOD 30 MIN: CPT | Performed by: INTERNAL MEDICINE

## 2017-08-29 ENCOUNTER — OFFICE VISIT (OUTPATIENT)
Dept: INTERNAL MEDICINE | Facility: CLINIC | Age: 67
End: 2017-08-29

## 2017-08-29 VITALS
OXYGEN SATURATION: 97 % | HEIGHT: 68 IN | DIASTOLIC BLOOD PRESSURE: 60 MMHG | HEART RATE: 47 BPM | SYSTOLIC BLOOD PRESSURE: 110 MMHG | TEMPERATURE: 97.8 F | WEIGHT: 197.38 LBS | BODY MASS INDEX: 29.91 KG/M2

## 2017-08-29 DIAGNOSIS — I10 ESSENTIAL HYPERTENSION: Primary | ICD-10-CM

## 2017-08-29 DIAGNOSIS — F32.0 MILD SINGLE CURRENT EPISODE OF MAJOR DEPRESSIVE DISORDER (HCC): ICD-10-CM

## 2017-08-29 DIAGNOSIS — F03.90 DEMENTIA WITHOUT BEHAVIORAL DISTURBANCE, UNSPECIFIED DEMENTIA TYPE: ICD-10-CM

## 2017-08-29 DIAGNOSIS — K21.9 GASTROESOPHAGEAL REFLUX DISEASE WITHOUT ESOPHAGITIS: ICD-10-CM

## 2017-08-29 DIAGNOSIS — E78.2 MIXED HYPERLIPIDEMIA: ICD-10-CM

## 2017-08-29 LAB
ALBUMIN SERPL-MCNC: 4 G/DL (ref 3.5–5)
ALBUMIN/GLOB SERPL: 1.7 G/DL (ref 1–2)
ALP SERPL-CCNC: 52 U/L (ref 38–126)
ALT SERPL-CCNC: 32 U/L (ref 13–69)
AST SERPL-CCNC: 25 U/L (ref 15–46)
BILIRUB SERPL-MCNC: 0.7 MG/DL (ref 0.2–1.3)
BUN SERPL-MCNC: 9 MG/DL (ref 7–20)
BUN/CREAT SERPL: 11.3 (ref 7.1–23.5)
CALCIUM SERPL-MCNC: 9.7 MG/DL (ref 8.4–10.2)
CHLORIDE SERPL-SCNC: 101 MMOL/L (ref 98–107)
CHOLEST SERPL-MCNC: 155 MG/DL (ref 0–199)
CO2 SERPL-SCNC: 30 MMOL/L (ref 26–30)
CREAT SERPL-MCNC: 0.8 MG/DL (ref 0.6–1.3)
GLOBULIN SER CALC-MCNC: 2.4 GM/DL
GLUCOSE SERPL-MCNC: 108 MG/DL (ref 74–98)
HDLC SERPL-MCNC: 53 MG/DL (ref 40–60)
LDLC SERPL CALC-MCNC: 79 MG/DL (ref 0–99)
POTASSIUM SERPL-SCNC: 4.6 MMOL/L (ref 3.5–5.1)
PROT SERPL-MCNC: 6.4 G/DL (ref 6.3–8.2)
SODIUM SERPL-SCNC: 141 MMOL/L (ref 137–145)
TRIGL SERPL-MCNC: 113 MG/DL
VLDLC SERPL CALC-MCNC: 22.6 MG/DL

## 2017-08-29 PROCEDURE — 99213 OFFICE O/P EST LOW 20 MIN: CPT | Performed by: INTERNAL MEDICINE

## 2017-08-29 RX ORDER — SODIUM CHLORIDE 9 MG/ML
70 INJECTION, SOLUTION INTRAVENOUS CONTINUOUS PRN
Status: CANCELLED | OUTPATIENT
Start: 2017-08-29

## 2017-08-29 RX ORDER — SODIUM CHLORIDE 0.9 % (FLUSH) 0.9 %
1-10 SYRINGE (ML) INJECTION AS NEEDED
Status: CANCELLED | OUTPATIENT
Start: 2017-08-29

## 2017-08-29 NOTE — PROGRESS NOTES
"Chief Complaint   Patient presents with   • Follow-up     2 weeks for colitis, HLD, HTN, and GERD. Pt states she has colonoscopy set up.      Subjective   Novel Azael Garcia is a 66 y.o. female.     HPI Comments: Here for follow up of HTN, HLD, paroxysmal Afib, CAD, PAT, COPD, GERD, dementia, anx/depr, RLS, lymphocytosis and recent transverse colitis.   She still has some loose stools with some blood on occasion.  She has colonoscopy scheduled.   Her appetite is good.   No recent CP, SOB or edema.   She is compliant with her CPAP.  No GERD sxs.   Her anxiety and depression are doing well overall.  Her memory may be worsening per daughter. Pt states [employer] does not always call her to RF her aricept and she feels this is why.  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          The following portions of the patient's history were reviewed and updated as appropriate: allergies, current medications, past family history, past medical history, past social history, past surgical history and problem list.    Review of Systems   Constitutional: Negative for chills, fever and unexpected weight change.   Respiratory: Negative for shortness of breath.    Cardiovascular: Negative for chest pain, palpitations and leg swelling.   Gastrointestinal: Positive for blood in stool. Negative for abdominal pain.        Occasional loose stool with blood   Psychiatric/Behavioral: Positive for confusion. Negative for dysphoric mood and sleep disturbance. The patient is nervous/anxious.    All other systems reviewed and are negative.      Objective   /60  Pulse (!) 47  Temp 97.8 °F (36.6 °C)  Ht 67.5\" (171.5 cm)  Wt 197 lb 6 oz (89.5 kg)  SpO2 97%  BMI 30.46 kg/m2  Body mass index is 30.46 kg/(m^2).  Physical Exam   Constitutional: She appears well-developed and well-nourished.   HENT:   Head: Normocephalic and atraumatic.   Mouth/Throat: Oropharynx is clear and moist.   Eyes: Conjunctivae and EOM are normal. Pupils are equal, " round, and reactive to light.   Neck: Normal range of motion. Neck supple. No thyromegaly present.   Cardiovascular: Normal rate, regular rhythm, normal heart sounds and intact distal pulses.    No murmur heard.  Pulmonary/Chest: Effort normal and breath sounds normal. No respiratory distress.   Abdominal: Soft. Bowel sounds are normal.   Musculoskeletal: She exhibits no edema.   Lymphadenopathy:     She has no cervical adenopathy.   Neurological: She is alert. No cranial nerve deficit.   Psychiatric: She has a normal mood and affect.   Slightly anxious today   Nursing note and vitals reviewed.      Assessment/Plan   Nery Garcia is here today and the following problems have been addressed:      Nery was seen today for follow-up.    Diagnoses and all orders for this visit:    Essential hypertension    Mixed hyperlipidemia    Gastroesophageal reflux disease without esophagitis    Mild single current episode of major depressive disorder    Dementia without behavioral disturbance, unspecified dementia type    Follow heart healthy/low cholesterol diet  Avoid processed/fried foods/fast food  Exercise as tolerated up to 30 minutes 5 days a week  Take all medications as prescribed  Continue lexapro, depression is doing well  Continue aricept, dementia is stable  Colonoscopy is scheduled per Dr Yamila Salazar today from June 9 for lipid panel    RTC 4 mo  Please note that portions of this note were completed with a voice recognition program.  Efforts were made to edit dictation, but occasionally words are mistranscribed.

## 2017-08-30 PROBLEM — R10.816 EPIGASTRIC ABDOMINAL TENDERNESS: Status: ACTIVE | Noted: 2017-08-30

## 2017-08-30 PROBLEM — K92.1 HEMATOCHEZIA: Status: ACTIVE | Noted: 2017-08-30

## 2017-08-30 PROBLEM — D64.9 ANEMIA: Status: ACTIVE | Noted: 2017-08-30

## 2017-08-30 PROBLEM — R19.7 DIARRHEA: Status: ACTIVE | Noted: 2017-08-30

## 2017-09-20 RX ORDER — ATORVASTATIN CALCIUM 40 MG/1
40 TABLET, FILM COATED ORAL DAILY
Qty: 90 TABLET | Refills: 3 | Status: SHIPPED | OUTPATIENT
Start: 2017-09-20 | End: 2018-04-06 | Stop reason: SDUPTHER

## 2017-09-22 RX ORDER — CITALOPRAM 20 MG/1
20 TABLET ORAL DAILY
COMMUNITY
End: 2019-11-20

## 2017-09-27 ENCOUNTER — HOSPITAL ENCOUNTER (OUTPATIENT)
Facility: HOSPITAL | Age: 67
Setting detail: HOSPITAL OUTPATIENT SURGERY
Discharge: HOME | End: 2017-09-27
Attending: INTERNAL MEDICINE | Admitting: INTERNAL MEDICINE

## 2017-09-27 ENCOUNTER — ANESTHESIA EVENT (OUTPATIENT)
Dept: GASTROENTEROLOGY | Facility: HOSPITAL | Age: 67
End: 2017-09-27

## 2017-09-27 ENCOUNTER — ANESTHESIA (OUTPATIENT)
Dept: GASTROENTEROLOGY | Facility: HOSPITAL | Age: 67
End: 2017-09-27

## 2017-09-27 VITALS
OXYGEN SATURATION: 96 % | BODY MASS INDEX: 29.03 KG/M2 | RESPIRATION RATE: 16 BRPM | HEIGHT: 67 IN | SYSTOLIC BLOOD PRESSURE: 161 MMHG | HEART RATE: 50 BPM | DIASTOLIC BLOOD PRESSURE: 70 MMHG | TEMPERATURE: 96.9 F | WEIGHT: 185 LBS

## 2017-09-27 DIAGNOSIS — K92.1 HEMATOCHEZIA: ICD-10-CM

## 2017-09-27 DIAGNOSIS — R10.816 EPIGASTRIC ABDOMINAL TENDERNESS, REBOUND TENDERNESS PRESENCE NOT SPECIFIED: ICD-10-CM

## 2017-09-27 DIAGNOSIS — R19.7 DIARRHEA, UNSPECIFIED TYPE: ICD-10-CM

## 2017-09-27 DIAGNOSIS — D64.9 ANEMIA, UNSPECIFIED TYPE: ICD-10-CM

## 2017-09-27 PROCEDURE — S0260 H&P FOR SURGERY: HCPCS | Performed by: INTERNAL MEDICINE

## 2017-09-27 PROCEDURE — 45380 COLONOSCOPY AND BIOPSY: CPT | Performed by: INTERNAL MEDICINE

## 2017-09-27 PROCEDURE — 88305 TISSUE EXAM BY PATHOLOGIST: CPT | Performed by: INTERNAL MEDICINE

## 2017-09-27 PROCEDURE — 25010000002 PROPOFOL 200 MG/20ML EMULSION: Performed by: NURSE ANESTHETIST, CERTIFIED REGISTERED

## 2017-09-27 RX ORDER — PROPOFOL 10 MG/ML
INJECTION, EMULSION INTRAVENOUS AS NEEDED
Status: DISCONTINUED | OUTPATIENT
Start: 2017-09-27 | End: 2017-09-27 | Stop reason: SURG

## 2017-09-27 RX ORDER — SODIUM CHLORIDE 0.9 % (FLUSH) 0.9 %
1-10 SYRINGE (ML) INJECTION AS NEEDED
Status: DISCONTINUED | OUTPATIENT
Start: 2017-09-27 | End: 2017-09-27 | Stop reason: HOSPADM

## 2017-09-27 RX ORDER — SODIUM CHLORIDE 9 MG/ML
70 INJECTION, SOLUTION INTRAVENOUS CONTINUOUS PRN
Status: DISCONTINUED | OUTPATIENT
Start: 2017-09-27 | End: 2017-09-27 | Stop reason: HOSPADM

## 2017-09-27 RX ADMIN — PROPOFOL 50 MG: 10 INJECTION, EMULSION INTRAVENOUS at 09:59

## 2017-09-27 RX ADMIN — PROPOFOL 100 MG: 10 INJECTION, EMULSION INTRAVENOUS at 09:56

## 2017-09-27 RX ADMIN — PROPOFOL 50 MG: 10 INJECTION, EMULSION INTRAVENOUS at 10:20

## 2017-09-27 RX ADMIN — PROPOFOL 50 MG: 10 INJECTION, EMULSION INTRAVENOUS at 09:50

## 2017-09-27 RX ADMIN — PROPOFOL 100 MG: 10 INJECTION, EMULSION INTRAVENOUS at 10:03

## 2017-09-27 RX ADMIN — PROPOFOL 50 MG: 10 INJECTION, EMULSION INTRAVENOUS at 10:14

## 2017-09-27 RX ADMIN — PROPOFOL 50 MG: 10 INJECTION, EMULSION INTRAVENOUS at 10:07

## 2017-09-27 RX ADMIN — SODIUM CHLORIDE 70 ML/HR: 9 INJECTION, SOLUTION INTRAVENOUS at 09:11

## 2017-09-27 NOTE — ANESTHESIA PREPROCEDURE EVALUATION
Anesthesia Evaluation     Patient summary reviewed and Nursing notes reviewed   no history of anesthetic complications:  NPO Solid Status: > 8 hours  NPO Liquid Status: > 8 hours     Airway   Mallampati: II  Neck ROM: full  no difficulty expected  Dental - normal exam     Pulmonary - normal exam    breath sounds clear to auscultation  (+) pneumonia resolved , pulmonary embolism, COPD mild, sleep apnea,   Cardiovascular - normal exam    ECG reviewed  Patient on routine beta blocker and Beta blocker given within 24 hours of surgery  Rhythm: regular  Rate: normal    (+) hypertension well controlled, CAD, dysrhythmias Atrial Fib, PVD, hyperlipidemia      Neuro/Psych  (+) psychiatric history Anxiety and Depression, dementia,    GI/Hepatic/Renal/Endo    (+) obesity,  GERD well controlled, PUD, hepatitis, liver disease,   (-) morbid obesity    Musculoskeletal (-) negative ROS    Abdominal    Substance History - negative use     OB/GYN negative ob/gyn ROS         Other - negative ROS       ROS/Med Hx Other: EKG-SB, RBBB    O2 at HS    Hx paroxysmal A. Fib  RLS  Hepatitis as a child                                  Anesthesia Plan    ASA 3     MAC     Anesthetic plan and risks discussed with patient.

## 2017-09-27 NOTE — ANESTHESIA POSTPROCEDURE EVALUATION
Patient: Nery Garcia    Procedure Summary     Date Anesthesia Start Anesthesia Stop Room / Location    09/27/17 0947 1030 [facility] ENDOSCOPY 2 / [facility] ENDOSCOPY       Procedure Diagnosis Surgeon Provider    COLONOSCOPY WITH COLD BIOPSY POLYPECTOMY; BIOPSIES (N/A Anus) Diverticulosis; Colon polyp; Internal hemorrhoid; Epigastric abdominal tenderness, rebound tenderness presence not specified  (Hematochezia [K92.1]; Anemia, unspecified type [D64.9]; Diarrhea, unspecified type [R19.7]; Epigastric abdominal tenderness, rebound tenderness presence not specified [R10.816]) MD Xavi Atkins CRNA          Anesthesia Type: MAC  Last vitals  BP        Temp        Pulse       Resp        SpO2          Post Anesthesia Care and Evaluation    Patient location during evaluation: PACU  Patient participation: complete - patient participated  Level of consciousness: awake and alert  Pain score: 0  Pain management: satisfactory to patient  Airway patency: patent  Anesthetic complications: No anesthetic complications  PONV Status: none  Cardiovascular status: stable and acceptable  Respiratory status: acceptable  Hydration status: acceptable

## 2017-10-04 ENCOUNTER — OFFICE VISIT (OUTPATIENT)
Dept: CARDIOLOGY | Facility: CLINIC | Age: 67
End: 2017-10-04

## 2017-10-04 VITALS
SYSTOLIC BLOOD PRESSURE: 139 MMHG | HEART RATE: 47 BPM | WEIGHT: 193.8 LBS | BODY MASS INDEX: 29.37 KG/M2 | DIASTOLIC BLOOD PRESSURE: 62 MMHG | HEIGHT: 68 IN

## 2017-10-04 DIAGNOSIS — I10 ESSENTIAL HYPERTENSION: ICD-10-CM

## 2017-10-04 DIAGNOSIS — I48.0 PAROXYSMAL ATRIAL FIBRILLATION (HCC): Primary | ICD-10-CM

## 2017-10-04 DIAGNOSIS — E78.2 MIXED HYPERLIPIDEMIA: ICD-10-CM

## 2017-10-04 PROCEDURE — 93000 ELECTROCARDIOGRAM COMPLETE: CPT | Performed by: INTERNAL MEDICINE

## 2017-10-04 PROCEDURE — 99214 OFFICE O/P EST MOD 30 MIN: CPT | Performed by: INTERNAL MEDICINE

## 2017-10-04 RX ORDER — SOTALOL HYDROCHLORIDE 80 MG/1
TABLET ORAL
Qty: 180 TABLET | Refills: 1 | Status: SHIPPED | OUTPATIENT
Start: 2017-10-04 | End: 2018-10-23 | Stop reason: SDUPTHER

## 2017-10-04 NOTE — PROGRESS NOTES
[facility] at [facility]  Office Progress Note  Nery Garcia  1950        Visit Date: 12/19/2016    PCP: Marilyn K. Vermeesch, MD  [address] [place], [place] [id number]    IDENTIFICATION: A 66 y.o. female  retired  from [employer]    Chief Complaint   Patient presents with   • Follow-up   • Fatigue   • Coronary Artery Disease   • Atrial Fibrillation       PROBLEM LIST:  1. Carotid artery disease.  a. Carotid MRA, January 2012, revealing 50% to 75% right ICA stenosis.  b. April 2013, carotid duplex with 70% to 80% stenosis on the right, less than 50% on the left.   2. Nonobstructive coronary artery disease with normal left ventricular function per cardiac catheterization in October 2012.  3. Atrial fibrillation.  a. Initial diagnosis in November 2010.  b. Previous Multaq therapy - currently on propafenone.  c. CHADS score equal to 1 with Pradaxa therapy.  4. Pulmonary embolus, January 2012.  5. Hypertension - labile.  6. Dyslipidemia.  a. July 2014: Total cholesterol 195, triglycerides 110, HDL 64, .   7. History of tobacco abuse with cessation in 2006.  8. Chronic obstructive pulmonary disease.  9. Gastroesophageal reflux disease.  10. Peptic ulcer disease noted on EGD, November 2013.  11. Restless leg syndrome.  12. Anxiety.  13. Fatty liver per evaluation, November 2013.  14. Dementia followed per Dr. Chandler at [facility]. 3 sisters with precocious dementia  15. Questionable sleep apnea.  16. Surgical history.  a. Hysterectomy.  b. Bilateral breast lumpectomies (benign).  Benign cataract extractions.    Allergies  Allergies   Allergen Reactions   • Lisinopril Cough       Current Medications    Current Outpatient Prescriptions:   •  amLODIPine (NORVASC) 10 MG tablet, Take 1 tablet by mouth Daily., Disp: 90 tablet, Rfl: 1  •  apixaban (ELIQUIS) 5 MG tablet tablet, Take 5 mg by mouth 2 (Two) Times a Day., Disp: , Rfl:   •  aspirin 81 MG  tablet, Take 81 mg by mouth Daily., Disp: , Rfl:   •  atorvastatin (LIPITOR) 40 MG tablet, Take 1 tablet by mouth Daily. (Patient taking differently: Take 40 mg by mouth Every Night.), Disp: 90 tablet, Rfl: 3  •  calcium-vitamin D (OSCAL 500/200 D-3) 500-200 MG-UNIT per tablet, Take 1 tablet by mouth Daily., Disp: , Rfl:   •  carbidopa-levodopa (SINEMET)  MG per tablet, Take 1 tablet by mouth Every Night., Disp: , Rfl:   •  citalopram (CeleXA) 20 MG tablet, Take 20 mg by mouth Daily., Disp: , Rfl:   •  donepezil (ARICEPT) 10 MG tablet, Take 1 tablet by mouth every night., Disp: , Rfl:   •  fluticasone (FLONASE) 50 MCG/ACT nasal spray, 2 sprays into each nostril Daily As Needed for Rhinitis or Allergies., Disp: , Rfl:   •  Lactobacillus (ACIDOPHILUS PROBIOTIC) 100 MG capsule, Take 1 capsule by mouth 3 (Three) Times a Day. (Patient taking differently: Take 1 capsule by mouth Daily.), Disp: 30 capsule, Rfl: 0  •  losartan (COZAAR) 100 MG tablet, Take 1 tablet by mouth Daily., Disp: 90 tablet, Rfl: 1  •  montelukast (SINGULAIR) 10 MG tablet, Take 1 tablet by mouth Daily., Disp: 90 tablet, Rfl: 3  •  Multiple Vitamin (MULTI-DAY VITAMINS) tablet, Take 1 tablet by mouth daily., Disp: , Rfl:   •  O2 (OXYGEN), Inhale 1 L/min Every Night., Disp: , Rfl:   •  pantoprazole (PROTONIX) 40 MG EC tablet, Take 1 tablet by mouth Daily., Disp: 90 tablet, Rfl: 3  •  sotalol (BETAPACE) 80 MG tablet, 1/2 tablet twice daily, Disp: 180 tablet, Rfl: 1  •  vitamin B-12 (CYANOCOBALAMIN) 1000 MCG tablet, Take 500 mcg by mouth Daily., Disp: , Rfl:   •  Vitamin E 400 UNITS tablet, Take 400 Units by mouth Daily., Disp: , Rfl:       History of Present Illness     Pt denies any chest pain, dyspnea, dyspnea on exertion, orthopnea, PND, palpitations, lower extremity edema.  Gained 15 lbs since last visit    ROS:  All systems have been reviewed and are negative with the exception of those mentioned in the HPI.    OBJECTIVE:  Vitals:    10/04/17  "1006   BP: 139/62   BP Location: Right arm   Patient Position: Sitting   Pulse: (!) 47   Weight: 193 lb 12.8 oz (87.9 kg)   Height: 68\" (172.7 cm)     Physical Exam   Constitutional: She appears well-developed and well-nourished.   Neck: Normal range of motion. Neck supple. No hepatojugular reflux and no JVD present. Carotid bruit is not present. No tracheal deviation present. No thyromegaly present.   Cardiovascular: Normal rate, regular rhythm, S1 normal, S2 normal, intact distal pulses and normal pulses.  PMI is not displaced.  Exam reveals no gallop, no distant heart sounds, no friction rub, no midsystolic click and no opening snap.    No murmur heard.  Pulses:       Radial pulses are 2+ on the right side, and 2+ on the left side.        Dorsalis pedis pulses are 2+ on the right side, and 2+ on the left side.        Posterior tibial pulses are 2+ on the right side, and 2+ on the left side.   Pulmonary/Chest: Effort normal and breath sounds normal. She has no wheezes. She has no rales.   Abdominal: Soft. Bowel sounds are normal. She exhibits no mass. There is no tenderness. There is no guarding.       Diagnostic Data:    ECG 12 Lead  Date/Time: 10/4/2017 10:28 AM  Performed by: BIMAL PARDO  Authorized by: BIMAL PARDO   Rhythm: sinus bradycardia  Other findings: prolonged QTc interval              ASSESSMENT:   Diagnosis Plan   1. Paroxysmal atrial fibrillation     2. Essential hypertension     3. Mixed hyperlipidemia         PLAN:  Patient with progressive dementia followed per now [facility]  Known carotid disease will follow-up carotid duplex in 6 months with follow-up appointment  Hypertension adequately controlled on current regimen  Atrial fibrillation with lowheart rate prolonged qTC on sotalol and anticoagulated but need for decrease sotalol dose to 40 bid short of changing altogether.     Marilyn K. Vermeesch, MD, thank you for referring Ms. Garcia for evaluation.  I have " forwarded my electronically generated recommendations to you for review.  Please do not hesitate to call with any questions.      Mega Kendall MD, FACC

## 2017-10-05 LAB
LAB AP CASE REPORT: NORMAL
Lab: NORMAL
PATH REPORT.FINAL DX SPEC: NORMAL

## 2017-11-06 ENCOUNTER — OFFICE VISIT (OUTPATIENT)
Dept: GASTROENTEROLOGY | Facility: CLINIC | Age: 67
End: 2017-11-06

## 2017-11-06 VITALS
HEART RATE: 65 BPM | BODY MASS INDEX: 28.95 KG/M2 | RESPIRATION RATE: 16 BRPM | HEIGHT: 68 IN | WEIGHT: 191 LBS | TEMPERATURE: 97 F | DIASTOLIC BLOOD PRESSURE: 59 MMHG | SYSTOLIC BLOOD PRESSURE: 137 MMHG

## 2017-11-06 DIAGNOSIS — R11.0 NAUSEA: ICD-10-CM

## 2017-11-06 DIAGNOSIS — R19.7 DIARRHEA, UNSPECIFIED TYPE: Primary | ICD-10-CM

## 2017-11-06 DIAGNOSIS — K52.9 COLITIS: ICD-10-CM

## 2017-11-06 PROCEDURE — 99214 OFFICE O/P EST MOD 30 MIN: CPT | Performed by: INTERNAL MEDICINE

## 2017-11-06 RX ORDER — ESCITALOPRAM OXALATE 20 MG/1
20 TABLET ORAL DAILY
COMMUNITY
Start: 2017-10-11 | End: 2021-10-25

## 2017-11-06 NOTE — PROGRESS NOTES
Chief Complaint   Patient presents with   • Follow-up       History of Present Illness     The patient came back for follow visit today.  The patient feels better.  The patient denies further diarrhea.  There is no constipation.   There is no history of abdominal pain.  The patient has history of bright red blood per rectum off and on for the last 2 weeks.  This is described as moderately severe.  Quantity being a 1-3 tablespoons at a time.  Frequency being 1-2 times a week.  There is no history of associated dizziness.  The patient denies anorectal pain.      There is no history of melena or hematemesis.  The patient has occasional  nausea perhaps once a month or so related to taking medications.  This lasted for a few minutes.  There is no history of reflux.  The patient denies dysphagia or odynophagia.  There is no history of recent significant weight loss.  There is no history of liver or pancreatic disease.  There is no history of liver disease or pancreatitis in the past.  She denies recent weight loss.     Review of Systems   Constitutional: Positive for fatigue. Negative for appetite change, chills, fever and unexpected weight change.   HENT: Negative for mouth sores, nosebleeds and trouble swallowing.    Eyes: Negative for discharge and redness.   Respiratory: Negative for apnea, cough and shortness of breath.    Cardiovascular: Negative for chest pain, palpitations and leg swelling.   Gastrointestinal: Positive for blood in stool and nausea. Negative for abdominal distention, abdominal pain, anal bleeding, constipation, diarrhea and vomiting.   Endocrine: Negative for cold intolerance, heat intolerance and polydipsia.   Genitourinary: Negative for dysuria, hematuria and urgency.   Musculoskeletal: Negative for arthralgias, joint swelling and myalgias.   Skin: Negative for rash.   Allergic/Immunologic: Negative for food allergies and immunocompromised state.   Neurological: Negative for dizziness, seizures,  syncope and headaches.   Hematological: Negative for adenopathy. Bruises/bleeds easily.   Psychiatric/Behavioral: Negative for dysphoric mood. The patient is not nervous/anxious and is not hyperactive.      Patient Active Problem List   Diagnosis   • Anxiety   • Dementia   • Depression   • Hyperlipidemia   • Hypertension   • Obstructive sleep apnea syndrome   • Paroxysmal atrial fibrillation   • Lymphocytosis   • Allergic rhinitis   • Chronic constipation   • CAD (coronary artery disease)   • COPD (chronic obstructive pulmonary disease)   • GERD (gastroesophageal reflux disease)   • Restless legs syndrome   • Carotid artery disease   • Acute suppurative otitis media of right ear without spontaneous rupture of tympanic membrane   • Chronic fatigue   • Colitis   • Hematochezia   • Anemia   • Diarrhea   • Epigastric abdominal tenderness     Past Medical History:   Diagnosis Date   • Acute bronchitis    • Acute otitis media    • Anxiety    • Atrial fibrillation    • Body piercing     EARS   • CAD (coronary artery disease)    • CAD (coronary artery disease)    • Cardiac insufficiency    • Chest pain    • Colitis    • COPD (chronic obstructive pulmonary disease)    • Dementia     PATIENT REPORTS VERY EARLY STAGES OF THIS.  REPORTS CURRENTLY LIVES BY HERSELF.   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 BUT DOES NOT WEAR THEM ANYMORE   • Hepatitis     REPORTS AS A CHILD, UNSURE TYPE   • History of esophageal reflux    • History of obesity    • Hyperlipidemia    • Hypertension    • Iron deficiency anemia    • Obesity      TRUNKAL   • On home oxygen therapy     REPORTS SHE WEARS AT 1L NC HS   • Otitis externa    • Peptic ulcer disease     noted on EGD, November 2013   • Pneumonia    • Pulmonary embolus 01/01/2012   • RLS (restless legs syndrome)    • Wears glasses     • Wears partial dentures     FULL UPPER PLATE AND PARTIAL LOWER PLATE     Past Surgical History:   Procedure Laterality Date   • BREAST EXCISIONAL BIOPSY Bilateral     Patient unsure of dates-    • BREAST SURGERY Bilateral     lumpectomies, PATIENT REPORTS BENIGN AND THAT THERE ARE NO RESTRICTIONS ON BUE'S   • CATARACT EXTRACTION Bilateral    • COLONOSCOPY N/A 9/27/2017    Procedure: COLONOSCOPY WITH COLD BIOPSY POLYPECTOMY; BIOPSIES;  Surgeon: Severiano Calloway MD;  Location: [facility] ENDOSCOPY;  Service:    • CORONARY ANGIOPLASTY  10/01/2012    Nonobstructive coronary artery disease with normal left ventricular function    • HYSTERECTOMY     • OOPHORECTOMY       Family History   Problem Relation Age of Onset   • Heart attack Mother    • Stroke Mother    • Heart attack Father    • Stroke Father    • Heart attack Brother    • Breast cancer Neg Hx    • Ovarian cancer Neg Hx    • Colon cancer Neg Hx      Social History   Substance Use Topics   • Smoking status: Former Smoker     Packs/day: 3.00     Years: 35.00     Types: Cigarettes     Quit date: 2005   • Smokeless tobacco: Never Used   • Alcohol use Yes      Comment: REPORTS HAS SEVERAL MIXED DRINKS WEEKLY       Current Outpatient Prescriptions:   •  amLODIPine (NORVASC) 10 MG tablet, Take 1 tablet by mouth Daily., Disp: 90 tablet, Rfl: 1  •  apixaban (ELIQUIS) 5 MG tablet tablet, Take 5 mg by mouth 2 (Two) Times a Day., Disp: , Rfl:   •  aspirin 81 MG tablet, Take 81 mg by mouth Daily., Disp: , Rfl:   •  atorvastatin (LIPITOR) 40 MG tablet, Take 1 tablet by mouth Daily. (Patient taking differently: Take 40 mg by mouth Every Night.), Disp: 90 tablet, Rfl: 3  •  calcium-vitamin D (OSCAL 500/200 D-3) 500-200 MG-UNIT per tablet, Take 1 tablet by mouth Daily., Disp: , Rfl:   •  carbidopa-levodopa (SINEMET)  MG per tablet, Take 1 tablet by mouth Every Night., Disp: , Rfl:   •  citalopram (CeleXA) 20 MG tablet, Take 20 mg by mouth Daily., Disp: , Rfl:   •  donepezil  "(ARICEPT) 10 MG tablet, Take 1 tablet by mouth every night., Disp: , Rfl:   •  escitalopram (LEXAPRO) 20 MG tablet, , Disp: , Rfl:   •  fluticasone (FLONASE) 50 MCG/ACT nasal spray, 2 sprays into each nostril Daily As Needed for Rhinitis or Allergies., Disp: , Rfl:   •  Lactobacillus (ACIDOPHILUS PROBIOTIC) 100 MG capsule, Take 1 capsule by mouth 3 (Three) Times a Day. (Patient taking differently: Take 1 capsule by mouth Daily.), Disp: 30 capsule, Rfl: 0  •  losartan (COZAAR) 100 MG tablet, Take 1 tablet by mouth Daily., Disp: 90 tablet, Rfl: 1  •  montelukast (SINGULAIR) 10 MG tablet, Take 1 tablet by mouth Daily., Disp: 90 tablet, Rfl: 3  •  Multiple Vitamin (MULTI-DAY VITAMINS) tablet, Take 1 tablet by mouth daily., Disp: , Rfl:   •  O2 (OXYGEN), Inhale 1 L/min Every Night., Disp: , Rfl:   •  pantoprazole (PROTONIX) 40 MG EC tablet, Take 1 tablet by mouth Daily., Disp: 90 tablet, Rfl: 3  •  sotalol (BETAPACE) 80 MG tablet, 1/2 tablet twice daily, Disp: 180 tablet, Rfl: 1  •  vitamin B-12 (CYANOCOBALAMIN) 1000 MCG tablet, Take 500 mcg by mouth Daily., Disp: , Rfl:   •  Vitamin E 400 UNITS tablet, Take 400 Units by mouth Daily., Disp: , Rfl:     Allergies   Allergen Reactions   • Lisinopril Cough       Blood pressure 137/59, pulse 65, temperature 97 °F (36.1 °C), resp. rate 16, height 68\" (172.7 cm), weight 191 lb (86.6 kg).    Physical Exam   Constitutional: She is oriented to person, place, and time. She appears well-developed and well-nourished. No distress.   HENT:   Head: Normocephalic and atraumatic.   Right Ear: Hearing and external ear normal.   Left Ear: Hearing and external ear normal.   Nose: Nose normal.   Mouth/Throat: Oropharynx is clear and moist and mucous membranes are normal. Mucous membranes are not pale, not dry and not cyanotic. No oral lesions. No oropharyngeal exudate.   Eyes: Conjunctivae and EOM are normal. Right eye exhibits no discharge. Left eye exhibits no discharge. No scleral icterus. "   Neck: Trachea normal. Neck supple. No JVD present. No edema present. No thyroid mass and no thyromegaly present.   Cardiovascular: Normal rate, regular rhythm, S2 normal and normal heart sounds.  Exam reveals no gallop, no S3 and no friction rub.    No murmur heard.  Pulmonary/Chest: Effort normal and breath sounds normal. No respiratory distress. She has no wheezes. She has no rales. She exhibits no tenderness.   Abdominal: Soft. Normal appearance and bowel sounds are normal. She exhibits no distension, no ascites and no mass. There is no splenomegaly or hepatomegaly. There is no tenderness. There is no rigidity, no rebound and no guarding. No hernia.   Musculoskeletal: She exhibits no tenderness or deformity.       Vascular Status -  Her exam exhibits no right foot edema. Her exam exhibits no left foot edema.  Lymphadenopathy:     She has no cervical adenopathy.        Left: No supraclavicular adenopathy present.   Neurological: She is alert and oriented to person, place, and time. She has normal strength. No cranial nerve deficit or sensory deficit. She exhibits normal muscle tone. Coordination normal.   Skin: No rash noted. She is not diaphoretic. No cyanosis. No pallor. Nails show no clubbing.   Psychiatric: She has a normal mood and affect. Her behavior is normal. Judgment and thought content normal.   Nursing note and vitals reviewed.    Stigmata of chronic liver disease:  None.  Asterixis:  None.      Laboratory Testing:  Upon review of medical records:    Dated April 26, 2017 sodium 142 potassium 4.6 chloride 103 CO2 28 BUN 14 serum creatinine 1.20 glucose 94.  Calcium 9.8.  Albumin 4.50.  T bili 0.8 AST 27 ALT 33 alkaline phosphatase 74.  WBC 5.62 hemoglobin 13.1 hematocrit 40.2 MCV 94.4 and platelet count 175.    Dated July 28, 2017 stool for enteric pathogens negative.  Stool for C. difficile toxins A and B, EIA negative.  Dated July 31, 2014 stool for enteric pathogens negative.    Dated August 14,  2017 sodium 140 potassium 4.9 chloride 99 CO2 29 BUN 11 serum creatinine 0.90 glucose 165.  Calcium 9.6.  Albumin 4.10.  T bili 0.8 AST 29 ALT 35 alkaline phosphatase 53.  WBC 6.29 hemoglobin 13.2 hematocrit 40.2 MCV 93.7 platelet count 164.    Dated August 29, 2017 sodium 141 potassium 4.6 chloride 101 CO2 30 BUN 9 serum creatinine 0.80 glucose 108.  Calcium 9.7.  Albumin 4.00.  T bili 0.7.  AST 25 ALT 32 alkaline phosphatase 52.  Total cholesterol 155.  Triglycerides 113.    Abdominal Imaging:  Upon review of medical records:    Dated July 30, 2017 the patient underwent a CT of the abdomen and pelvis with contrast which revealed: Bilateral lower lobe atelectasis.  Small hiatal hernia.  Liver is low attenuation consistent with fatty infiltration.  Gallbladder is contracted.  Spleen, pancreas, adrenal glands and kidneys are unremarkable.  There are renal calcifications, likely vascular.  Aorta and iliac arteries are calcified without aneurysm.  The bowel gas pattern is nonobstructive.  Scattered colonic diverticulosis without evidence for diverticulitis.  There is wall thickening of transverse colon suggesting colitis.  No evidence for appendicitis.  Bladder is normal in contour.    Procedures:  Upon review of medical records:     Dated February 6, 2014 the patient underwent an upper endoscopy which revealed: Somewhat thickened distal esophageal mucosa with some adherent debris status post biopsies.  Small sliding hiatal hernia less than 3 cm.  Erythematous, erosive gastritis.  Significant tortuosity and tertiary contractions of esophageal body and distal esophagus were noted.  No scalloping was seen within the second portion of duodenum.  No Manning mucosa was noted.  Good distensibility of the stomach was noted.  No giant folds were seen.  No gastroesophageal varices or portal hypertensive gastropathy was seen.  Second portion of duodenum, biopsy revealed no pathologic alterations.  Negative for celiac disease.   Gastric antrum and body, biopsy revealed reactive gastropathy (chemical gastropathy).  No H. pylori identified (negative CFV stain, adequate control).  Negative for metaplasia, dysplasia or malignancy.  Gastric body, linear erosion, biopsy revealed mucosal congestion without ulceration.  No H. pylori identified (negative CFV stain, adequate control).  Negative for metaplasia, dysplasia or malignancy.  Distal esophagus biopsy revealed reflux esophagitis.  No metaplasia identified (negative AB/PAS stain, adequate control).  Negative for dysplasia and malignancy.    Dated September 27, 2017 the patient underwent a colonoscopy to the terminal ileum which revealed: Diverticulosis.  Colon polyp.  Mild focal erythema within the rectum.  Internal hemorrhoids.  Rectal biopsies revealed focal active colitis with reactive epithelial changes, mild focal crypt architectural distortion, clusters up epithelioid cells resembling neurons and ganglion cells are present.  No evidence of basal plasmacytosis, microscopic colitis, dysplasia or malignancy.  Descending colon polyp, biopsy revealed fragments of benign colonic mucosa with prominent lymphoid aggregates.  No evidence of dysplasia or malignancy.  Random colon biopsies revealed fragments of benign colonic mucosa with reactive epithelial changes, mild focal crypt architectural distortion and clusters of epithelioid cells resembling neurons and ganglion cyst cells are present.  No evidence of dysplasia or malignancy.  These clusters of epithelioid cells are favored to represent neuronal hyperplasia, however they could be a granulomas.  There is minimal evidence of chronicity to suggest that the active colitis is caused by IBD.    Assessment and Plan:      Nery was seen today for follow-up.    Diagnoses and all orders for this visit:    Diarrhea, unspecified type  Comments:  Improved.    Colitis  Comments:  No colonoscopic suggestion of ileitis or colitis.  Biopsies not consistent  with collagenous or lymphocytic colitis.  However, inflammatory changes were noted.    Nausea  Comments:  History of occasional nausea to some medications at times.              Plan  and Patient Instructions:    Patient Instructions   1. Low-fat-moderate lactose moderate fiber diet.  2. Hemorrhoidal care.  3. The patient should continue to watch the symptoms of diarrhea.  If there is recurrence of diarrhea the patient has been advised to call back.  4. She has been advised to call in 1 week regarding progress.  5. A possible follow-up colonoscopy may be considered in September 2022.  It may be considered sooner if the patient has symptoms.  6. Discussed with the patient and her daughter in detail.  Opportunity was given to ask questions.        Severiano Calloway MD

## 2017-11-06 NOTE — PATIENT INSTRUCTIONS
1. Low-fat-moderate lactose moderate fiber diet.  2. Hemorrhoidal care.  3. The patient should continue to watch the symptoms of diarrhea.  If there is recurrence of diarrhea the patient has been advised to call back.  4. She has been advised to call in 1 week regarding progress.  5. A possible follow-up colonoscopy may be considered in September 2022.  It may be considered sooner if the patient has symptoms.  6. Discussed with the patient and her daughter in detail.  Opportunity was given to ask questions.

## 2017-12-06 ENCOUNTER — OFFICE VISIT (OUTPATIENT)
Dept: INTERNAL MEDICINE | Facility: CLINIC | Age: 67
End: 2017-12-06

## 2017-12-06 VITALS
SYSTOLIC BLOOD PRESSURE: 120 MMHG | OXYGEN SATURATION: 98 % | DIASTOLIC BLOOD PRESSURE: 62 MMHG | WEIGHT: 194 LBS | HEIGHT: 68 IN | TEMPERATURE: 98.1 F | HEART RATE: 60 BPM | BODY MASS INDEX: 29.4 KG/M2

## 2017-12-06 DIAGNOSIS — I10 ESSENTIAL HYPERTENSION: Primary | ICD-10-CM

## 2017-12-06 DIAGNOSIS — F41.9 ANXIETY: ICD-10-CM

## 2017-12-06 DIAGNOSIS — I48.0 PAROXYSMAL ATRIAL FIBRILLATION (HCC): ICD-10-CM

## 2017-12-06 DIAGNOSIS — E78.2 MIXED HYPERLIPIDEMIA: ICD-10-CM

## 2017-12-06 DIAGNOSIS — K21.9 GASTROESOPHAGEAL REFLUX DISEASE WITHOUT ESOPHAGITIS: ICD-10-CM

## 2017-12-06 DIAGNOSIS — F32.0 MILD SINGLE CURRENT EPISODE OF MAJOR DEPRESSIVE DISORDER (HCC): ICD-10-CM

## 2017-12-06 PROBLEM — K92.1 HEMATOCHEZIA: Status: RESOLVED | Noted: 2017-08-30 | Resolved: 2017-12-06

## 2017-12-06 PROBLEM — R10.816 EPIGASTRIC ABDOMINAL TENDERNESS: Status: RESOLVED | Noted: 2017-08-30 | Resolved: 2017-12-06

## 2017-12-06 PROBLEM — H66.001 ACUTE SUPPURATIVE OTITIS MEDIA OF RIGHT EAR WITHOUT SPONTANEOUS RUPTURE OF TYMPANIC MEMBRANE: Status: RESOLVED | Noted: 2017-04-11 | Resolved: 2017-12-06

## 2017-12-06 PROBLEM — K52.9 COLITIS: Status: RESOLVED | Noted: 2017-07-30 | Resolved: 2017-12-06

## 2017-12-06 PROBLEM — R19.7 DIARRHEA: Status: RESOLVED | Noted: 2017-08-30 | Resolved: 2017-12-06

## 2017-12-06 LAB — TSH SERPL DL<=0.005 MIU/L-ACNC: 1.88 MIU/ML (ref 0.47–4.68)

## 2017-12-06 PROCEDURE — 99213 OFFICE O/P EST LOW 20 MIN: CPT | Performed by: INTERNAL MEDICINE

## 2017-12-06 PROCEDURE — G0008 ADMIN INFLUENZA VIRUS VAC: HCPCS | Performed by: INTERNAL MEDICINE

## 2017-12-06 PROCEDURE — 90662 IIV NO PRSV INCREASED AG IM: CPT | Performed by: INTERNAL MEDICINE

## 2017-12-06 RX ORDER — LOSARTAN POTASSIUM 100 MG/1
100 TABLET ORAL DAILY
Qty: 90 TABLET | Refills: 1 | Status: SHIPPED | OUTPATIENT
Start: 2017-12-06 | End: 2018-04-06 | Stop reason: SDUPTHER

## 2017-12-06 RX ORDER — AMLODIPINE BESYLATE 10 MG/1
10 TABLET ORAL DAILY
Qty: 90 TABLET | Refills: 1 | Status: SHIPPED | OUTPATIENT
Start: 2017-12-06 | End: 2018-04-06 | Stop reason: SDUPTHER

## 2017-12-06 NOTE — PROGRESS NOTES
"Chief Complaint   Patient presents with   • Follow-up     4 months for anxiety, depression, GERD, HLD, and HTN. Pt states she stays cold all the time.      Subjective   Novel Azael Garcia is a 67 y.o. female.     HPI Comments: Here for follow up of HTN, HLD, paroxysmal Afib, CAD, PAT, COPD, GERD, dementia, anx/depr, RLS, and lymphocytosis.   She has not had any CP, SOA, palpitations or edema.   She is having sxs of feeling cold all the time.  Is compliant with her CPAP every night.  Her RLS is doing well.   Her GERD is doing well on protonix in AM.   She has an albuterol MDI to use if needed, but never uses it.   Has an appt Dec 18th with [facility] for her memory.  Has been on study for intranasal insulin, has been removed from study.   Her mammogram is due later this month, she will schedule this.               The following portions of the patient's history were reviewed and updated as appropriate: allergies, current medications, past family history, past medical history, past social history, past surgical history and problem list.    Review of Systems   Respiratory: Negative for shortness of breath.    Cardiovascular: Negative for chest pain, palpitations and leg swelling.   Gastrointestinal: Negative.    Endocrine: Positive for cold intolerance.   Musculoskeletal: Negative for myalgias.   Psychiatric/Behavioral: Positive for confusion. Negative for dysphoric mood. The patient is not nervous/anxious.    All other systems reviewed and are negative.      Objective   /62  Pulse 60  Temp 98.1 °F (36.7 °C)  Ht 172.7 cm (68\")  Wt 88 kg (194 lb)  SpO2 98%  BMI 29.5 kg/m2  Body mass index is 29.5 kg/(m^2).  Physical Exam   Constitutional: She is oriented to person, place, and time. She appears well-developed and well-nourished.   HENT:   Head: Normocephalic and atraumatic.   Mouth/Throat: Oropharynx is clear and moist.   Dentures noted   Eyes: Conjunctivae and EOM are normal. Pupils are equal, round, and reactive " to light.   Neck: Normal range of motion. Neck supple. No thyromegaly present.   Cardiovascular: Normal rate, regular rhythm, normal heart sounds and intact distal pulses.    No murmur heard.  Currently in normal sinus rhythm today   Pulmonary/Chest: Effort normal and breath sounds normal. No respiratory distress.   Abdominal: Soft. Bowel sounds are normal.   Musculoskeletal: She exhibits no edema.   Lymphadenopathy:     She has no cervical adenopathy.   Neurological: She is alert and oriented to person, place, and time. No cranial nerve deficit.   Psychiatric: She has a normal mood and affect.   Nursing note and vitals reviewed.      Assessment/Plan   Nery Garcia is here today and the following problems have been addressed:      Nery was seen today for follow-up.    Diagnoses and all orders for this visit:    Essential hypertension    Mixed hyperlipidemia    Gastroesophageal reflux disease without esophagitis    Mild single current episode of major depressive disorder    Paroxysmal atrial fibrillation  -     TSH    Anxiety  -     TSH    Other orders  -     losartan (COZAAR) 100 MG tablet; Take 1 tablet by mouth Daily.  -     amLODIPine (NORVASC) 10 MG tablet; Take 1 tablet by mouth Daily.    Follow heart healthy/low salt diet  Avoid processed foods  Monitor blood pressure as discussed  Exercise as tolerated up to 30 minutes 5 days per week  Take all medications as prescribed  Labs as noted  She will arrange mammogram  Check on shingles vaccine  Flu vaccine given today  It was recommended she get regular eye exam    RTC 4 mo    Please note that portions of this note were completed with a voice recognition program.  Efforts were made to edit dictation, but occasionally words are mistranscribed.

## 2017-12-07 ENCOUNTER — TELEPHONE (OUTPATIENT)
Dept: INTERNAL MEDICINE | Facility: CLINIC | Age: 67
End: 2017-12-07

## 2017-12-07 NOTE — TELEPHONE ENCOUNTER
This may be a side effect of her eliquis and heart medications.  I do not want to make any changes in these medications, please tell her to wear several layers of clothing.

## 2017-12-07 NOTE — TELEPHONE ENCOUNTER
----- Message from Marilyn K Vermeesch, MD sent at 12/7/2017  7:42 AM EST -----  Please tell family that patient's TSH level is in normal range.

## 2017-12-12 ENCOUNTER — TRANSCRIBE ORDERS (OUTPATIENT)
Dept: MAMMOGRAPHY | Facility: HOSPITAL | Age: 67
End: 2017-12-12

## 2017-12-12 DIAGNOSIS — Z12.39 BREAST CANCER SCREENING: Primary | ICD-10-CM

## 2018-01-11 ENCOUNTER — HOSPITAL ENCOUNTER (OUTPATIENT)
Dept: MAMMOGRAPHY | Facility: HOSPITAL | Age: 68
Discharge: HOME | End: 2018-01-11
Admitting: INTERNAL MEDICINE

## 2018-01-11 DIAGNOSIS — Z12.39 BREAST CANCER SCREENING: ICD-10-CM

## 2018-01-11 PROCEDURE — 77067 SCR MAMMO BI INCL CAD: CPT

## 2018-01-11 PROCEDURE — 77063 BREAST TOMOSYNTHESIS BI: CPT

## 2018-04-06 ENCOUNTER — OFFICE VISIT (OUTPATIENT)
Dept: INTERNAL MEDICINE | Facility: CLINIC | Age: 68
End: 2018-04-06

## 2018-04-06 VITALS
OXYGEN SATURATION: 97 % | DIASTOLIC BLOOD PRESSURE: 62 MMHG | WEIGHT: 200.5 LBS | HEIGHT: 68 IN | BODY MASS INDEX: 30.39 KG/M2 | TEMPERATURE: 97.6 F | HEART RATE: 59 BPM | SYSTOLIC BLOOD PRESSURE: 124 MMHG

## 2018-04-06 DIAGNOSIS — R09.89 BRUIT OF LEFT CAROTID ARTERY: ICD-10-CM

## 2018-04-06 DIAGNOSIS — E78.2 MIXED HYPERLIPIDEMIA: ICD-10-CM

## 2018-04-06 DIAGNOSIS — K21.9 GASTROESOPHAGEAL REFLUX DISEASE WITHOUT ESOPHAGITIS: ICD-10-CM

## 2018-04-06 DIAGNOSIS — I10 ESSENTIAL HYPERTENSION: Primary | ICD-10-CM

## 2018-04-06 DIAGNOSIS — I77.9 BILATERAL CAROTID ARTERY DISEASE (HCC): ICD-10-CM

## 2018-04-06 DIAGNOSIS — I48.0 PAROXYSMAL ATRIAL FIBRILLATION (HCC): ICD-10-CM

## 2018-04-06 DIAGNOSIS — F32.0 MILD SINGLE CURRENT EPISODE OF MAJOR DEPRESSIVE DISORDER (HCC): ICD-10-CM

## 2018-04-06 PROCEDURE — 99213 OFFICE O/P EST LOW 20 MIN: CPT | Performed by: INTERNAL MEDICINE

## 2018-04-06 RX ORDER — PANTOPRAZOLE SODIUM 40 MG/1
40 TABLET, DELAYED RELEASE ORAL DAILY
Qty: 90 TABLET | Refills: 3 | Status: SHIPPED | OUTPATIENT
Start: 2018-04-06 | End: 2019-01-07 | Stop reason: SDUPTHER

## 2018-04-06 RX ORDER — LOSARTAN POTASSIUM 100 MG/1
100 TABLET ORAL DAILY
Qty: 90 TABLET | Refills: 1 | Status: SHIPPED | OUTPATIENT
Start: 2018-04-06 | End: 2019-01-07 | Stop reason: SDUPTHER

## 2018-04-06 RX ORDER — MONTELUKAST SODIUM 10 MG/1
10 TABLET ORAL DAILY
Qty: 90 TABLET | Refills: 3 | Status: SHIPPED | OUTPATIENT
Start: 2018-04-06 | End: 2019-06-22 | Stop reason: SDUPTHER

## 2018-04-06 RX ORDER — AMLODIPINE BESYLATE 10 MG/1
10 TABLET ORAL DAILY
Qty: 90 TABLET | Refills: 1 | Status: SHIPPED | OUTPATIENT
Start: 2018-04-06 | End: 2019-03-11 | Stop reason: SDUPTHER

## 2018-04-06 RX ORDER — ATORVASTATIN CALCIUM 40 MG/1
40 TABLET, FILM COATED ORAL DAILY
Qty: 90 TABLET | Refills: 3 | Status: SHIPPED | OUTPATIENT
Start: 2018-04-06 | End: 2020-01-14 | Stop reason: SDUPTHER

## 2018-04-06 NOTE — PROGRESS NOTES
"Chief Complaint   Patient presents with   • Follow-up     4 months for anxiety, COPD, depression, HLD, and HTN. No new complaints.      Subjective   Novel Azael Garcia is a 67 y.o. female.     Here for follow up of HTN, HLD, paroxysmal Afib, CAD, PAT, COPD, GERD, dementia, anx/depr, RLS and lymphocytosis.   Blood pressure is well controlled today.  Heart rate is in acceptable range.  Patient remains on sotalol and eliquis for A. Fib.  She does not check her BP.  No CP, palpitations.  She does get SOA with exertion. No edema.   Uses her oxygen at night.  She does not use her nebulizer machine.  Denies wheezing or tightness in chest.  No current allergies.   No current GERD sxs.   She does have some good days and bad days in regards to depression and anxiety.  If she is alone a lot that makes it worse.  She is sleeping well overall.  She is still being seen regularly at  neurology for her dementia, but is not in any current studies.  No changes in any medications.   She does not eat a healthy diet.  She does not cook much, eats out a lot.  Does not exercise.  She lives a lone.          The following portions of the patient's history were reviewed and updated as appropriate: allergies, current medications, past family history, past medical history, past social history, past surgical history and problem list.    Review of Systems   Respiratory: Positive for shortness of breath.    Cardiovascular: Negative for chest pain, palpitations and leg swelling.   Gastrointestinal: Negative.    Genitourinary: Negative.    Psychiatric/Behavioral: Positive for confusion and dysphoric mood. Negative for sleep disturbance. The patient is nervous/anxious.    All other systems reviewed and are negative.      Objective   /62   Pulse 59   Temp 97.6 °F (36.4 °C)   Ht 172.7 cm (68\")   Wt 90.9 kg (200 lb 8 oz)   SpO2 97%   BMI 30.49 kg/m²   Body mass index is 30.49 kg/m².  Physical Exam   Constitutional: She appears " well-developed and well-nourished.   HENT:   Head: Normocephalic and atraumatic.   Mouth/Throat: Oropharynx is clear and moist.   Eyes: Conjunctivae and EOM are normal. Pupils are equal, round, and reactive to light.   Neck: Normal range of motion. Neck supple. No thyromegaly present.   Cardiovascular: Normal rate, regular rhythm, normal heart sounds and intact distal pulses.    No murmur heard.  Bilateral carotid bruits, left greater than right   Pulmonary/Chest: Effort normal and breath sounds normal. No respiratory distress.   Abdominal: Soft. Bowel sounds are normal.   Musculoskeletal: She exhibits no edema.   Lymphadenopathy:     She has no cervical adenopathy.   Neurological: She is alert. No cranial nerve deficit.   Psychiatric: Her behavior is normal.   Flat affect today   Nursing note and vitals reviewed.      Assessment/Plan   Nery Garcia is here today and the following problems have been addressed:      Nery was seen today for follow-up.    Diagnoses and all orders for this visit:    Essential hypertension    Mixed hyperlipidemia    Gastroesophageal reflux disease without esophagitis    Mild single current episode of major depressive disorder    Paroxysmal atrial fibrillation    Bilateral carotid artery disease  -     US Carotid Bilateral    Bruit of left carotid artery  -     US Carotid Bilateral    Other orders  -     pantoprazole (PROTONIX) 40 MG EC tablet; Take 1 tablet by mouth Daily.  -     montelukast (SINGULAIR) 10 MG tablet; Take 1 tablet by mouth Daily.  -     amLODIPine (NORVASC) 10 MG tablet; Take 1 tablet by mouth Daily.  -     losartan (COZAAR) 100 MG tablet; Take 1 tablet by mouth Daily.  -     atorvastatin (LIPITOR) 40 MG tablet; Take 1 tablet by mouth Daily.    Follow heart healthy/low salt diet  Avoid processed foods  Monitor blood pressure as discussed  Exercise as tolerated up to 30 minutes 5 days per week  Take all medications as prescribed  US of carotids due to bruits  Her  GERD is doing well on protonix  Depression and anxiety are stable    RTC 4 mo, labs then    Please note that portions of this note were completed with a voice recognition program.  Efforts were made to edit dictation, but occasionally words are mistranscribed.

## 2018-04-10 ENCOUNTER — HOSPITAL ENCOUNTER (OUTPATIENT)
Dept: ULTRASOUND IMAGING | Facility: HOSPITAL | Age: 68
Discharge: HOME | End: 2018-04-10
Admitting: INTERNAL MEDICINE

## 2018-04-10 PROCEDURE — 93880 EXTRACRANIAL BILAT STUDY: CPT

## 2018-04-10 NOTE — PROGRESS NOTES
Please call family with ultrasound results.  Ultrasound of her carotid arteries reveals that she is at a point where she is having significant stenosis on the right side that increases her risk for stroke.  I recommend consult with a vascular surgeon for a carotid endarterectomy.  Please ask them if she is ever seen a vascular surgeon for any reason, if so I prefer she see the same person.  If not I will make referral for her.

## 2018-04-12 ENCOUNTER — TELEPHONE (OUTPATIENT)
Dept: INTERNAL MEDICINE | Facility: CLINIC | Age: 68
End: 2018-04-12

## 2018-04-12 DIAGNOSIS — I65.21 CAROTID STENOSIS, RIGHT: Primary | ICD-10-CM

## 2018-04-12 NOTE — TELEPHONE ENCOUNTER
----- Message from Marilyn K Vermeesch, MD sent at 4/10/2018  5:42 PM EDT -----  Please call family with ultrasound results.  Ultrasound of her carotid arteries reveals that she is at a point where she is having significant stenosis on the right side that increases her risk for stroke.  I recommend consult with a vascular surgeon   for a carotid endarterectomy.  Please ask them if she is ever seen a vascular surgeon for any reason, if so I prefer she see the same person.  If not I will make referral for her.

## 2018-07-16 ENCOUNTER — OFFICE VISIT (OUTPATIENT)
Dept: INTERNAL MEDICINE | Facility: CLINIC | Age: 68
End: 2018-07-16

## 2018-07-16 VITALS
OXYGEN SATURATION: 95 % | DIASTOLIC BLOOD PRESSURE: 60 MMHG | HEART RATE: 56 BPM | HEIGHT: 68 IN | BODY MASS INDEX: 29.4 KG/M2 | SYSTOLIC BLOOD PRESSURE: 122 MMHG | WEIGHT: 194 LBS | TEMPERATURE: 97.9 F

## 2018-07-16 DIAGNOSIS — Z00.00 ENCOUNTER FOR MEDICARE ANNUAL WELLNESS EXAM: Primary | ICD-10-CM

## 2018-07-16 DIAGNOSIS — M89.9 DISORDER OF BONE: ICD-10-CM

## 2018-07-16 DIAGNOSIS — Z13.820 SCREENING FOR OSTEOPOROSIS: ICD-10-CM

## 2018-07-16 PROCEDURE — G0439 PPPS, SUBSEQ VISIT: HCPCS | Performed by: INTERNAL MEDICINE

## 2018-07-16 NOTE — PROGRESS NOTES
QUICK REFERENCE INFORMATION:  The ABCs of the Annual Wellness Visit    Subsequent Medicare Wellness Visit    HEALTH RISK ASSESSMENT    1950    Recent Hospitalizations:  Recently treated at the following:  [facility]. For colitis        Current Medical Providers:  Patient Care Team:  Marilyn K Vermeesch, MD as PCP - General  Marilyn K Vermeesch, MD as PCP - Family Medicine  Marilyn K Vermeesch, MD as PCP - Claims Attributed        Smoking Status:  History   Smoking Status   • Former Smoker   • Packs/day: 3.00   • Years: 35.00   • Types: Cigarettes   • Quit date: 2005   Smokeless Tobacco   • Never Used       Alcohol Consumption:  History   Alcohol Use   • Yes     Comment: REPORTS HAS SEVERAL MIXED DRINKS WEEKLY       Depression Screen:   PHQ-2/PHQ-9 Depression Screening 7/16/2018   Little interest or pleasure in doing things 0   Feeling down, depressed, or hopeless 1   Total Score 1       Health Habits and Functional and Cognitive Screening:  Functional & Cognitive Status 7/16/2018   Do you have difficulty preparing food and eating? No   Do you have difficulty bathing yourself, getting dressed or grooming yourself? No   Do you have difficulty using the toilet? No   Do you have difficulty moving around from place to place? No   Do you have trouble with steps or getting out of a bed or a chair? No   In the past year have you fallen or experienced a near fall? No   Current Diet Unhealthy Diet   Dental Exam Not up to date   Eye Exam Not up to date   Exercise (times per week) 1 times per week   Current Exercise Activities Include Gardening   Do you need help using the phone?  No   Are you deaf or do you have serious difficulty hearing?  No   Do you need help with transportation? No   Do you need help shopping? No   Do you need help preparing meals?  No   Do you need help with housework?  No   Do you need help with laundry? No   Do you need help taking your medications? Yes   Do you need help managing  money? No   Do you ever drive or ride in a car without wearing a seat belt? No   Have you felt unusual stress, anger or loneliness in the last month? No   Who do you live with? Alone   If you need help, do you have trouble finding someone available to you? No   Do you have difficulty concentrating, remembering or making decisions? Yes           Does the patient have evidence of cognitive impairment? Yes    Aspirin use counseling: Taking ASA appropriately as indicated      Recent Lab Results:  CMP:  Lab Results   Component Value Date     (H) 08/29/2017    BUN 9 08/29/2017    CREATININE 0.80 08/29/2017    EGFRIFNONA 72 08/29/2017    EGFRIFAFRI 87 08/29/2017    BCR 11.3 08/29/2017     08/29/2017    K 4.6 08/29/2017    CO2 30.0 08/29/2017    CALCIUM 9.7 08/29/2017    PROTENTOTREF 6.4 08/29/2017    ALBUMIN 4.00 08/29/2017    LABGLOBREF 2.4 08/29/2017    LABIL2 1.7 08/29/2017    BILITOT 0.7 08/29/2017    ALKPHOS 52 08/29/2017    AST 25 08/29/2017    ALT 32 08/29/2017     Lipid Panel:  Lab Results   Component Value Date    CHOL 176 07/08/2016    TRIG 113 08/29/2017    HDL 53 08/29/2017    VLDL 22.6 08/29/2017     HbA1c:       Visual Acuity:  No exam data present    Age-appropriate Screening Schedule:  Refer to the list below for future screening recommendations based on patient's age, sex and/or medical conditions. Orders for these recommended tests are listed in the plan section. The patient has been provided with a written plan.    Health Maintenance   Topic Date Due   • ZOSTER VACCINE (1 of 2) 11/14/2001   • INFLUENZA VACCINE  08/01/2018   • LIPID PANEL  08/29/2018   • PNEUMOCOCCAL VACCINES (65+ LOW/MEDIUM RISK) (2 of 2 - PPSV23) 01/01/2019   • MAMMOGRAM  01/11/2020   • COLONOSCOPY  09/27/2027   • TDAP/TD VACCINES  Excluded        Subjective   History of Present Illness    Nery Garcia is a 67 y.o. female who presents for an Subsequent Wellness Visit.  PMH of HTN, HLD, Afib, CAD, PAT, carotid  stenosis, COPD, GERD, dementia, anemia, lymphocytosis, anx/depression, RLS.  Her BP and HR are well controlled.  She uses O2 at night to sleep. She does not use her nebulizer. No GERD sxs.  She denies any RLS.  Her daughter does feel she is depressed at times.  She has some anxiety or panic when she forgets something.     The following portions of the patient's history were reviewed and updated as appropriate: allergies, current medications, past family history, past medical history, past social history, past surgical history and problem list.    Outpatient Medications Prior to Visit   Medication Sig Dispense Refill   • amLODIPine (NORVASC) 10 MG tablet Take 1 tablet by mouth Daily. 90 tablet 1   • apixaban (ELIQUIS) 5 MG tablet tablet Take 5 mg by mouth 2 (Two) Times a Day.     • aspirin 81 MG tablet Take 81 mg by mouth Daily.     • atorvastatin (LIPITOR) 40 MG tablet Take 1 tablet by mouth Daily. 90 tablet 3   • calcium-vitamin D (OSCAL 500/200 D-3) 500-200 MG-UNIT per tablet Take 1 tablet by mouth Daily.     • carbidopa-levodopa (SINEMET)  MG per tablet Take 1 tablet by mouth Every Night.     • citalopram (CeleXA) 20 MG tablet Take 20 mg by mouth Daily.     • donepezil (ARICEPT) 10 MG tablet Take 1 tablet by mouth every night.     • escitalopram (LEXAPRO) 20 MG tablet      • fluticasone (FLONASE) 50 MCG/ACT nasal spray 2 sprays into each nostril Daily As Needed for Rhinitis or Allergies.     • Lactobacillus (ACIDOPHILUS PROBIOTIC) 100 MG capsule Take 1 capsule by mouth 3 (Three) Times a Day. (Patient taking differently: Take 1 capsule by mouth Daily.) 30 capsule 0   • losartan (COZAAR) 100 MG tablet Take 1 tablet by mouth Daily. 90 tablet 1   • montelukast (SINGULAIR) 10 MG tablet Take 1 tablet by mouth Daily. 90 tablet 3   • Multiple Vitamin (MULTI-DAY VITAMINS) tablet Take 1 tablet by mouth daily.     • O2 (OXYGEN) Inhale 1 L/min Every Night.     • pantoprazole (PROTONIX) 40 MG EC tablet Take 1 tablet by  mouth Daily. 90 tablet 3   • sotalol (BETAPACE) 80 MG tablet 1/2 tablet twice daily 180 tablet 1   • vitamin B-12 (CYANOCOBALAMIN) 1000 MCG tablet Take 500 mcg by mouth Daily.     • Vitamin E 400 UNITS tablet Take 400 Units by mouth Daily.       No facility-administered medications prior to visit.        Patient Active Problem List   Diagnosis   • Anxiety   • Dementia   • Depression   • Hyperlipidemia   • Hypertension   • Obstructive sleep apnea syndrome   • Paroxysmal atrial fibrillation (CMS/HCC)   • Lymphocytosis   • Allergic rhinitis   • Chronic constipation   • CAD (coronary artery disease)   • COPD (chronic obstructive pulmonary disease) (CMS/HCC)   • GERD (gastroesophageal reflux disease)   • Restless legs syndrome   • Carotid artery disease (CMS/[facility])   • Chronic fatigue   • Anemia   • Bruit of left carotid artery   • Encounter for Medicare annual wellness exam   • Screening for osteoporosis       Advance Care Planning:  has an advance directive - a copy HAS NOT been provided. Have asked the patient to send this to us to add to record.    Identification of Risk Factors:  Risk factors include: weight , unhealthy diet, cardiovascular risk, inactivity, cognitive impairment, hearing limitations and polypharmacy.    Review of Systems   Constitutional: Positive for fatigue.   HENT: Positive for hearing loss and postnasal drip.    Eyes: Negative.    Respiratory: Positive for shortness of breath.    Cardiovascular: Negative.    Gastrointestinal: Negative.    Endocrine: Negative.    Genitourinary: Negative.    Musculoskeletal: Positive for arthralgias.   Skin: Negative.    Allergic/Immunologic: Positive for environmental allergies.   Neurological: Negative.    Hematological: Negative.    Psychiatric/Behavioral: Positive for dysphoric mood and sleep disturbance. The patient is nervous/anxious.        Compared to one year ago, the patient feels her physical health is the same.  Compared to one year ago, the patient  "feels her mental health is the same.    Objective     Physical Exam   Constitutional: She is oriented to person, place, and time. She appears well-developed and well-nourished.   HENT:   Head: Normocephalic and atraumatic.   Right Ear: External ear normal.   Left Ear: External ear normal.   Mouth/Throat: Oropharynx is clear and moist.   Full dentures are noted   Eyes: Conjunctivae and EOM are normal. Pupils are equal, round, and reactive to light.   Neck: Normal range of motion. Neck supple. No thyromegaly present.   Cardiovascular: Normal rate, regular rhythm, normal heart sounds and intact distal pulses.    No murmur heard.  Left carotid bruit noted   Pulmonary/Chest: Effort normal and breath sounds normal. No respiratory distress.   Abdominal: Soft. Bowel sounds are normal.   Musculoskeletal: She exhibits no edema.   Lymphadenopathy:     She has no cervical adenopathy.   Neurological: She is alert and oriented to person, place, and time. No cranial nerve deficit.   Psychiatric: Her behavior is normal. Judgment and thought content normal.   Patient does have some mild confusion and occasionally looks to her daughter to help with answers to some questions.  She does not appear depressed, but does appear anxious at times   Nursing note and vitals reviewed.      Vitals:    07/16/18 1136   BP: 122/60   Pulse: 56   Temp: 97.9 °F (36.6 °C)   SpO2: 95%   Weight: 88 kg (194 lb)   Height: 172.7 cm (68\")   PainSc: 0-No pain       Patient's Body mass index is 29.5 kg/m². BMI is above normal parameters. Recommendations include: exercise counseling and nutrition counseling.      Assessment/Plan   Patient Self-Management and Personalized Health Advice  The patient has been provided with information about: diet, exercise, weight management, alcohol use and supplements and preventive services including:   · Alcohol use counseling completed, Bone densitometry screening, Diabetes screening, see lab orders, Exercise counseling " provided, Glaucoma screening recommended, Nutrition counseling provided, shingles shot at pharmacy.    Visit Diagnoses:    ICD-10-CM ICD-9-CM   1. Encounter for Medicare annual wellness exam Z00.00 V70.0   2. Screening for osteoporosis Z13.820 V82.81   3. Disorder of bone  M89.9 733.90       Orders Placed This Encounter   Procedures   • DEXA Bone Density Axial     Order Specific Question:   Reason for Exam:     Answer:   screening   • Comprehensive Metabolic Panel   • Hemoglobin A1c   • Lipid Panel With / Chol / HDL Ratio   • CBC & Differential     Order Specific Question:   Manual Differential     Answer:   No       Outpatient Encounter Prescriptions as of 7/16/2018   Medication Sig Dispense Refill   • amLODIPine (NORVASC) 10 MG tablet Take 1 tablet by mouth Daily. 90 tablet 1   • apixaban (ELIQUIS) 5 MG tablet tablet Take 5 mg by mouth 2 (Two) Times a Day.     • aspirin 81 MG tablet Take 81 mg by mouth Daily.     • atorvastatin (LIPITOR) 40 MG tablet Take 1 tablet by mouth Daily. 90 tablet 3   • calcium-vitamin D (OSCAL 500/200 D-3) 500-200 MG-UNIT per tablet Take 1 tablet by mouth Daily.     • carbidopa-levodopa (SINEMET)  MG per tablet Take 1 tablet by mouth Every Night.     • citalopram (CeleXA) 20 MG tablet Take 20 mg by mouth Daily.     • donepezil (ARICEPT) 10 MG tablet Take 1 tablet by mouth every night.     • escitalopram (LEXAPRO) 20 MG tablet      • fluticasone (FLONASE) 50 MCG/ACT nasal spray 2 sprays into each nostril Daily As Needed for Rhinitis or Allergies.     • Lactobacillus (ACIDOPHILUS PROBIOTIC) 100 MG capsule Take 1 capsule by mouth 3 (Three) Times a Day. (Patient taking differently: Take 1 capsule by mouth Daily.) 30 capsule 0   • losartan (COZAAR) 100 MG tablet Take 1 tablet by mouth Daily. 90 tablet 1   • montelukast (SINGULAIR) 10 MG tablet Take 1 tablet by mouth Daily. 90 tablet 3   • Multiple Vitamin (MULTI-DAY VITAMINS) tablet Take 1 tablet by mouth daily.     • O2 (OXYGEN) Inhale  1 L/min Every Night.     • pantoprazole (PROTONIX) 40 MG EC tablet Take 1 tablet by mouth Daily. 90 tablet 3   • sotalol (BETAPACE) 80 MG tablet 1/2 tablet twice daily 180 tablet 1   • vitamin B-12 (CYANOCOBALAMIN) 1000 MCG tablet Take 500 mcg by mouth Daily.     • Vitamin E 400 UNITS tablet Take 400 Units by mouth Daily.       No facility-administered encounter medications on file as of 7/16/2018.        Reviewed use of high risk medication in the elderly: yes  Reviewed for potential of harmful drug interactions in the elderly: yes     HILTON ordered  Stop vit D  Bring in copy of living will  She will make appt with eye doctor  She will get shingles vaccine at pharmacy  Labs as noted  She recently saw Dr Romano for left carotid bruit and has follow up again at end of yr    RTC 6 mo    Follow Up:  Return in about 6 months (around 1/16/2019) for Next scheduled follow up.     An After Visit Summary and PPPS with all of these plans were given to the patient.

## 2018-07-17 LAB
ALBUMIN SERPL-MCNC: 4.5 G/DL (ref 3.6–4.8)
ALBUMIN/GLOB SERPL: 2.1 {RATIO} (ref 1.2–2.2)
ALP SERPL-CCNC: 59 IU/L (ref 39–117)
ALT SERPL-CCNC: 17 IU/L (ref 0–32)
AST SERPL-CCNC: 22 IU/L (ref 0–40)
BASOPHILS # BLD AUTO: 0 X10E3/UL (ref 0–0.2)
BASOPHILS NFR BLD AUTO: 0 %
BILIRUB SERPL-MCNC: 0.7 MG/DL (ref 0–1.2)
BUN SERPL-MCNC: 10 MG/DL (ref 8–27)
BUN/CREAT SERPL: 12 (ref 12–28)
CALCIUM SERPL-MCNC: 9.8 MG/DL (ref 8.7–10.3)
CHLORIDE SERPL-SCNC: 102 MMOL/L (ref 96–106)
CHOLEST SERPL-MCNC: 182 MG/DL (ref 100–199)
CHOLEST/HDLC SERPL: 2.4 RATIO (ref 0–4.4)
CO2 SERPL-SCNC: 26 MMOL/L (ref 20–29)
CREAT SERPL-MCNC: 0.83 MG/DL (ref 0.57–1)
EOSINOPHIL # BLD AUTO: 0.2 X10E3/UL (ref 0–0.4)
EOSINOPHIL NFR BLD AUTO: 2 %
ERYTHROCYTE [DISTWIDTH] IN BLOOD BY AUTOMATED COUNT: 13 % (ref 12.3–15.4)
GLOBULIN SER CALC-MCNC: 2.1 G/DL (ref 1.5–4.5)
GLUCOSE SERPL-MCNC: 101 MG/DL (ref 65–99)
HBA1C MFR BLD: 5.7 % (ref 4.8–5.6)
HCT VFR BLD AUTO: 41.2 % (ref 34–46.6)
HDLC SERPL-MCNC: 77 MG/DL
HGB BLD-MCNC: 13.4 G/DL (ref 11.1–15.9)
IMM GRANULOCYTES # BLD: 0 X10E3/UL (ref 0–0.1)
IMM GRANULOCYTES NFR BLD: 0 %
LDLC SERPL CALC-MCNC: 86 MG/DL (ref 0–99)
LYMPHOCYTES # BLD AUTO: 2.4 X10E3/UL (ref 0.7–3.1)
LYMPHOCYTES NFR BLD AUTO: 34 %
MCH RBC QN AUTO: 30.7 PG (ref 26.6–33)
MCHC RBC AUTO-ENTMCNC: 32.5 G/DL (ref 31.5–35.7)
MCV RBC AUTO: 94 FL (ref 79–97)
MONOCYTES # BLD AUTO: 0.4 X10E3/UL (ref 0.1–0.9)
MONOCYTES NFR BLD AUTO: 5 %
NEUTROPHILS # BLD AUTO: 4.1 X10E3/UL (ref 1.4–7)
NEUTROPHILS NFR BLD AUTO: 59 %
PLATELET # BLD AUTO: 172 X10E3/UL (ref 150–379)
POTASSIUM SERPL-SCNC: 4.7 MMOL/L (ref 3.5–5.2)
PROT SERPL-MCNC: 6.6 G/DL (ref 6–8.5)
RBC # BLD AUTO: 4.37 X10E6/UL (ref 3.77–5.28)
SODIUM SERPL-SCNC: 140 MMOL/L (ref 134–144)
TRIGL SERPL-MCNC: 95 MG/DL (ref 0–149)
VLDLC SERPL CALC-MCNC: 19 MG/DL (ref 5–40)
WBC # BLD AUTO: 7 X10E3/UL (ref 3.4–10.8)

## 2018-07-26 ENCOUNTER — APPOINTMENT (OUTPATIENT)
Dept: BONE DENSITY | Facility: HOSPITAL | Age: 68
End: 2018-07-26

## 2018-07-26 PROCEDURE — 77080 DXA BONE DENSITY AXIAL: CPT

## 2018-08-29 ENCOUNTER — OFFICE VISIT (OUTPATIENT)
Dept: CARDIOLOGY | Facility: CLINIC | Age: 68
End: 2018-08-29

## 2018-08-29 VITALS
HEIGHT: 68 IN | DIASTOLIC BLOOD PRESSURE: 40 MMHG | SYSTOLIC BLOOD PRESSURE: 112 MMHG | WEIGHT: 195 LBS | BODY MASS INDEX: 29.55 KG/M2 | HEART RATE: 58 BPM

## 2018-08-29 DIAGNOSIS — I10 ESSENTIAL HYPERTENSION: ICD-10-CM

## 2018-08-29 DIAGNOSIS — I48.0 PAROXYSMAL ATRIAL FIBRILLATION (HCC): ICD-10-CM

## 2018-08-29 DIAGNOSIS — R09.89 CAROTID BRUIT, UNSPECIFIED LATERALITY: ICD-10-CM

## 2018-08-29 DIAGNOSIS — I67.9 CVD (CEREBROVASCULAR DISEASE): Primary | ICD-10-CM

## 2018-08-29 PROCEDURE — 93000 ELECTROCARDIOGRAM COMPLETE: CPT | Performed by: INTERNAL MEDICINE

## 2018-08-29 PROCEDURE — 99214 OFFICE O/P EST MOD 30 MIN: CPT | Performed by: INTERNAL MEDICINE

## 2018-08-29 RX ORDER — MEMANTINE HYDROCHLORIDE 5 MG/1
10 TABLET ORAL 2 TIMES DAILY
COMMUNITY
Start: 2018-07-19 | End: 2021-09-23 | Stop reason: ALTCHOICE

## 2018-08-29 NOTE — PROGRESS NOTES
[facility] at [facility]  Office Progress Note  Nery Garcia  1950        Visit Date: 8/29/2018      PCP: Vermeesch, Marilyn K, MD  [address] [place], [place] [id number]    IDENTIFICATION: A 67 y.o. female  retired  from [employer]    Chief Complaint   Patient presents with   • Paroxysmal atrial fibrillation (CMS/HCC)       PROBLEM LIST:  1. Carotid artery disease.  a. Carotid MRA, January 2012, revealing 50% to 75% right ICA stenosis.  b. April 2018, carotid duplex with 70% stenosis on the right, less than 50% on the left. Dr Jackson saldivar, pt deferred future fu   2. Nonobstructive coronary artery disease with normal left ventricular function per cardiac catheterization in October 2012.  3. Atrial fibrillation.  a. Initial diagnosis in November 2010.  b. Previous Multaq therapy - currently on propafenone.  c. CHADS score equal to 1 with Pradaxa therapy.  4. Pulmonary embolus, January 2012.  5. Hypertension - labile.  6. Dyslipidemia.  a. July 2014: Total cholesterol 195, triglycerides 110, HDL 64, .   7. History of tobacco abuse with cessation in 2006.  8. Chronic obstructive pulmonary disease.  9. Gastroesophageal reflux disease.  10. Peptic ulcer disease noted on EGD, November 2013.  11. Restless leg syndrome.  12. Anxiety.  13. Fatty liver per evaluation, November 2013.  14. Dementia followed per Dr. Chandler at [facility]. 3 sisters with precocious dementia  15. Questionable sleep apnea.  16. Surgical history.  a. Hysterectomy.  b. Bilateral breast lumpectomies (benign).  Benign cataract extractions.    Allergies  Allergies   Allergen Reactions   • Lisinopril Cough       Current Medications    Current Outpatient Prescriptions:   •  amLODIPine (NORVASC) 10 MG tablet, Take 1 tablet by mouth Daily., Disp: 90 tablet, Rfl: 1  •  apixaban (ELIQUIS) 5 MG tablet tablet, Take 5 mg by mouth 2 (Two) Times a Day., Disp: , Rfl:   •  aspirin 81 MG  tablet, Take 81 mg by mouth Daily., Disp: , Rfl:   •  atorvastatin (LIPITOR) 40 MG tablet, Take 1 tablet by mouth Daily., Disp: 90 tablet, Rfl: 3  •  calcium-vitamin D (OSCAL 500/200 D-3) 500-200 MG-UNIT per tablet, Take 1 tablet by mouth Daily., Disp: , Rfl:   •  carbidopa-levodopa (SINEMET)  MG per tablet, Take 1 tablet by mouth Every Night., Disp: , Rfl:   •  citalopram (CeleXA) 20 MG tablet, Take 20 mg by mouth Daily., Disp: , Rfl:   •  donepezil (ARICEPT) 10 MG tablet, Take 1 tablet by mouth every night., Disp: , Rfl:   •  escitalopram (LEXAPRO) 20 MG tablet, Take 20 mg by mouth Daily., Disp: , Rfl:   •  fluticasone (FLONASE) 50 MCG/ACT nasal spray, 2 sprays into each nostril Daily As Needed for Rhinitis or Allergies., Disp: , Rfl:   •  losartan (COZAAR) 100 MG tablet, Take 1 tablet by mouth Daily., Disp: 90 tablet, Rfl: 1  •  memantine (NAMENDA) 5 MG tablet, Take 5 mg by mouth Daily., Disp: , Rfl:   •  Multiple Vitamin (MULTI-DAY VITAMINS) tablet, Take 1 tablet by mouth daily., Disp: , Rfl:   •  O2 (OXYGEN), Inhale 1 L/min Every Night., Disp: , Rfl:   •  pantoprazole (PROTONIX) 40 MG EC tablet, Take 1 tablet by mouth Daily., Disp: 90 tablet, Rfl: 3  •  vitamin B-12 (CYANOCOBALAMIN) 1000 MCG tablet, Take 500 mcg by mouth Daily., Disp: , Rfl:   •  montelukast (SINGULAIR) 10 MG tablet, Take 1 tablet by mouth Daily., Disp: 90 tablet, Rfl: 3  •  sotalol (BETAPACE) 80 MG tablet, 1/2 tablet twice daily, Disp: 180 tablet, Rfl: 1      History of Present Illness     Pt denies any chest pain, dyspnea, dyspnea on exertion, orthopnea, PND, palpitations, lower extremity edema.  Memory impairment worsening.  Had unrewarding appt w Dr Paz and wants future carotid evals elsewhere.  ROS:  All systems have been reviewed and are negative with the exception of those mentioned in the HPI.    OBJECTIVE:  Vitals:    08/29/18 1457   BP: 112/40   BP Location: Right arm   Patient Position: Sitting   Pulse: 58   Weight: 88.5  "kg (195 lb)   Height: 172.7 cm (68\")     Physical Exam   Constitutional: She appears well-developed and well-nourished.   Neck: Normal range of motion. Neck supple. No hepatojugular reflux and no JVD present. Carotid bruit is not present. No tracheal deviation present. No thyromegaly present.   Cardiovascular: Normal rate, regular rhythm, S1 normal, S2 normal, intact distal pulses and normal pulses.  PMI is not displaced.  Exam reveals no gallop, no distant heart sounds, no friction rub, no midsystolic click and no opening snap.    No murmur heard.  Pulses:       Carotid pulses are on the right side with bruit, and on the left side with bruit.       Radial pulses are 2+ on the right side, and 2+ on the left side.        Dorsalis pedis pulses are 2+ on the right side, and 2+ on the left side.        Posterior tibial pulses are 2+ on the right side, and 2+ on the left side.   Pulmonary/Chest: Effort normal and breath sounds normal. She has no wheezes. She has no rales.   Abdominal: Soft. Bowel sounds are normal. She exhibits no mass. There is no tenderness. There is no guarding.       Diagnostic Data:    ECG 12 Lead  Date/Time: 8/29/2018 1:20 PM  Performed by: BIMAL PARDO  Authorized by: BIMAL PARDO   Comparison: compared with previous ECG from 10/4/2017  Rhythm: sinus bradycardia  Rate: bradycardic  BPM: 56  Conduction: right bundle branch block  ST Segments: ST segments normal  QRS axis: normal  Clinical impression: abnormal ECG              ASSESSMENT:   Diagnosis Plan   1. CVD (cerebrovascular disease)  Duplex Carotid Ultrasound CAR   2. Essential hypertension     3. Paroxysmal atrial fibrillation (CMS/HCC)     4. Carotid bruit, unspecified laterality  Duplex Carotid Ultrasound CAR       PLAN:  Patient with progressive dementia followed per now [facility]  Known carotid disease will follow-up carotid duplex in 8 months with follow-up appointment as is asymptomatic  Hypertension " adequately controlled on current regimen  Atrial fibrillation with low heart rate prolonged qTC on sotalol and anticoagulated but need for decrease sotalol dose to 40 bid short of changing altogether.     Vermeesch, Marilyn K, MD, thank you for referring Ms. Garcia for evaluation.  I have forwarded my electronically generated recommendations to you for review.  Please do not hesitate to call with any questions.      Mega Kendall MD, FACC

## 2018-09-25 ENCOUNTER — OFFICE VISIT (OUTPATIENT)
Dept: INTERNAL MEDICINE | Facility: CLINIC | Age: 68
End: 2018-09-25

## 2018-09-25 VITALS
HEART RATE: 60 BPM | OXYGEN SATURATION: 96 % | TEMPERATURE: 97.7 F | SYSTOLIC BLOOD PRESSURE: 138 MMHG | DIASTOLIC BLOOD PRESSURE: 72 MMHG

## 2018-09-25 DIAGNOSIS — J00 ACUTE NASOPHARYNGITIS: Primary | ICD-10-CM

## 2018-09-25 PROCEDURE — 99213 OFFICE O/P EST LOW 20 MIN: CPT | Performed by: INTERNAL MEDICINE

## 2018-09-25 NOTE — PROGRESS NOTES
Chief Complaint   Patient presents with   • Cough     head congestion, headache, no energy, and runny nose X 1 week.      Subjective   Novel Azael Garcia is a 67 y.o. female.     Cough   This is a new problem. The current episode started in the past 7 days. The problem has been waxing and waning. The problem occurs hourly. The cough is non-productive. Associated symptoms include headaches, nasal congestion, postnasal drip and rhinorrhea. Pertinent negatives include no chills, ear congestion, ear pain, fever, sore throat, shortness of breath, sweats or wheezing. Nothing aggravates the symptoms. She has tried nothing for the symptoms. Her past medical history is significant for COPD.        The following portions of the patient's history were reviewed and updated as appropriate: allergies, current medications, past family history, past medical history, past social history, past surgical history and problem list.    Review of Systems   Constitutional: Negative for chills and fever.   HENT: Positive for postnasal drip and rhinorrhea. Negative for ear pain and sore throat.    Respiratory: Positive for cough. Negative for shortness of breath and wheezing.    Neurological: Positive for headaches.       Objective   /72   Pulse 60   Temp 97.7 °F (36.5 °C)   SpO2 96%   There is no height or weight on file to calculate BMI.  Physical Exam   Constitutional: She is oriented to person, place, and time. She appears well-developed and well-nourished.   Pleasant female with loose cough noted, no apparent distress   HENT:   Head: Normocephalic and atraumatic.   Right Ear: External ear normal.   Left Ear: External ear normal.   Mouth/Throat: Oropharynx is clear and moist.   Tympanic membranes slightly dull, turbinates erythematous with white rhinorrhea and white postnasal drip, no sinus tenderness   Eyes: Pupils are equal, round, and reactive to light. Conjunctivae and EOM are normal.   Neck: Normal range of motion. Neck  supple. No thyromegaly present.   Cardiovascular: Normal rate, regular rhythm and normal heart sounds.    No murmur heard.  Pulmonary/Chest: Effort normal and breath sounds normal. No respiratory distress. She has no wheezes. She has no rales.   Abdominal: There is no tenderness.   Lymphadenopathy:     She has no cervical adenopathy.   Neurological: She is alert and oriented to person, place, and time. No cranial nerve deficit.   Skin: Skin is warm and dry.   Psychiatric: She has a normal mood and affect. Her behavior is normal. Judgment and thought content normal.   Nursing note and vitals reviewed.      Assessment/Plan   Nery Garcia is here today and the following problems have been addressed:      Nery was seen today for cough.    Diagnoses and all orders for this visit:    Acute nasopharyngitis    Loose cough noted on exam, otherwise no significant abnormalities  Patient was encouraged to take Coricidin HBP for flu and cold symptoms as directed  She may also take one a day Mucinex 12 hour in the morning with a significant amount of water to help with cough  Increase fluids and rest    Return to clinic if symptoms worsen or persist    Please note that portions of this note were completed with a voice recognition program.  Efforts were made to edit dictation, but occasionally words are mistranscribed.

## 2018-10-17 ENCOUNTER — OFFICE VISIT (OUTPATIENT)
Dept: INTERNAL MEDICINE | Facility: CLINIC | Age: 68
End: 2018-10-17

## 2018-10-17 VITALS
WEIGHT: 197 LBS | TEMPERATURE: 97.5 F | BODY MASS INDEX: 29.86 KG/M2 | HEART RATE: 62 BPM | OXYGEN SATURATION: 96 % | HEIGHT: 68 IN | SYSTOLIC BLOOD PRESSURE: 147 MMHG | DIASTOLIC BLOOD PRESSURE: 79 MMHG

## 2018-10-17 DIAGNOSIS — L72.3 SEBACEOUS CYST: Primary | ICD-10-CM

## 2018-10-17 PROCEDURE — 99213 OFFICE O/P EST LOW 20 MIN: CPT | Performed by: PHYSICIAN ASSISTANT

## 2018-10-17 RX ORDER — SULFAMETHOXAZOLE AND TRIMETHOPRIM 800; 160 MG/1; MG/1
1 TABLET ORAL 2 TIMES DAILY
Qty: 14 TABLET | Refills: 0 | Status: SHIPPED | OUTPATIENT
Start: 2018-10-17 | End: 2019-01-16

## 2018-10-17 NOTE — PROGRESS NOTES
Subjective     Chief Complaint: knot on chest    History of Present Illness     Nery Garcia is a 67 y.o. female presenting with complaints of a sore not to center of chest that she first noticed around 3 days ago.  It is uncomfortable to the touch.  It is not increasing in size.  Denies any fevers, chills.  It has not drained anything.  Denies any similar areas elsewhere on body. Otherwise patient feels well.    The following portions of the patient's history were reviewed and updated as appropriate: current medications, allergies, PMH.    Review of Systems   Constitutional: Negative for appetite change, chills, fatigue, fever and unexpected weight change.   HENT: Negative for congestion, ear pain, hearing loss, nosebleeds, sinus pressure, sore throat, tinnitus and trouble swallowing.    Eyes: Negative for pain, discharge, redness, itching and visual disturbance.   Respiratory: Negative for cough, chest tightness, shortness of breath and wheezing.    Cardiovascular: Negative for chest pain, palpitations and leg swelling.   Gastrointestinal: Negative for abdominal pain, blood in stool, constipation, diarrhea, nausea and vomiting.   Endocrine: Negative for cold intolerance, heat intolerance, polydipsia, polyphagia and polyuria.   Genitourinary: Negative for decreased urine volume, dysuria, flank pain, frequency and hematuria.   Musculoskeletal: Negative for arthralgias, back pain, gait problem, joint swelling, myalgias, neck pain and neck stiffness.   Skin: Negative for color change and rash.        Cyst.   Allergic/Immunologic: Negative for environmental allergies, food allergies and immunocompromised state.   Neurological: Negative for dizziness, syncope, weakness, light-headedness and headaches.   Hematological: Negative for adenopathy. Does not bruise/bleed easily.   Psychiatric/Behavioral: Negative for dysphoric mood, sleep disturbance and suicidal ideas. The patient is not nervous/anxious.      Objective  "    Vitals:    10/17/18 0859   BP: 147/79   Pulse: 62   Temp: 97.5 °F (36.4 °C)   SpO2: 96%   Weight: 89.4 kg (197 lb)   Height: 172.7 cm (67.99\")     Physical Exam   Constitutional: She appears well-developed and well-nourished.   HENT:   Mouth/Throat: Oropharynx is clear and moist.   Eyes: Pupils are equal, round, and reactive to light. Conjunctivae and EOM are normal.   Cardiovascular: Normal rate and regular rhythm.    Pulmonary/Chest: Effort normal and breath sounds normal.   Skin:        Round, small, erythematous nodule to chest wall. Uncomfortable to palpation. Umbilicated at center.              Assessment/Plan     Diagnoses and all orders for this visit:    Sebaceous cyst  -     sulfamethoxazole-trimethoprim (BACTRIM DS) 800-160 MG per tablet; Take 1 tablet by mouth 2 (Two) Times a Day.    Appears as infected cyst. Differential would also include insect bite, lipoma. We will try bactrim, warm compresses. Discussed RTC with any expanding redness, fever, etc.  If not improving with bactrim, will have patient see dermatology. May need surgical intervention.  Daughter and patient verbalized understanding.    Andreea Cabrera PA-C  10/17/2018         Please note that portions of this note were completed with a voice recognition program. Efforts were made to edit dictation, but occasionally words are mistranscribed.    "

## 2018-10-24 RX ORDER — SOTALOL HYDROCHLORIDE 80 MG/1
TABLET ORAL
Qty: 90 TABLET | Refills: 3 | Status: SHIPPED | OUTPATIENT
Start: 2018-10-24 | End: 2020-01-13

## 2018-11-14 ENCOUNTER — TELEPHONE (OUTPATIENT)
Dept: CARDIOLOGY | Facility: CLINIC | Age: 68
End: 2018-11-14

## 2018-11-14 NOTE — TELEPHONE ENCOUNTER
Patient called about her oxygen and if she should turn that back into they company. Asked patient who put her on oxygen and she stated she did not remember. Advised her to contact her pulmonary doctor, Dr Ramirez as it is likely they are managing. Patient verbalized understanding.

## 2018-12-07 ENCOUNTER — TELEPHONE (OUTPATIENT)
Dept: CARDIOLOGY | Facility: CLINIC | Age: 68
End: 2018-12-07

## 2018-12-07 NOTE — TELEPHONE ENCOUNTER
Patient called to see if we got back test to determine if she needed O2. Informed her that was done with Dr Ramirez. Provided her with [employer] number to call.

## 2019-01-07 RX ORDER — PANTOPRAZOLE SODIUM 40 MG/1
40 TABLET, DELAYED RELEASE ORAL DAILY
Qty: 90 TABLET | Refills: 1 | Status: SHIPPED | OUTPATIENT
Start: 2019-01-07 | End: 2019-09-30 | Stop reason: SDUPTHER

## 2019-01-07 RX ORDER — LOSARTAN POTASSIUM 100 MG/1
100 TABLET ORAL DAILY
Qty: 90 TABLET | Refills: 1 | Status: SHIPPED | OUTPATIENT
Start: 2019-01-07 | End: 2019-01-12 | Stop reason: SDUPTHER

## 2019-01-12 ENCOUNTER — HOSPITAL ENCOUNTER (EMERGENCY)
Facility: HOSPITAL | Age: 69
Discharge: HOME | End: 2019-01-12
Attending: EMERGENCY MEDICINE | Admitting: EMERGENCY MEDICINE

## 2019-01-12 ENCOUNTER — APPOINTMENT (OUTPATIENT)
Dept: GENERAL RADIOLOGY | Facility: HOSPITAL | Age: 69
End: 2019-01-12

## 2019-01-12 VITALS
HEIGHT: 67 IN | HEART RATE: 81 BPM | SYSTOLIC BLOOD PRESSURE: 149 MMHG | WEIGHT: 200 LBS | OXYGEN SATURATION: 94 % | TEMPERATURE: 98.2 F | BODY MASS INDEX: 31.39 KG/M2 | RESPIRATION RATE: 16 BRPM | DIASTOLIC BLOOD PRESSURE: 61 MMHG

## 2019-01-12 DIAGNOSIS — N39.0 URINARY TRACT INFECTION WITHOUT HEMATURIA, SITE UNSPECIFIED: ICD-10-CM

## 2019-01-12 DIAGNOSIS — R53.1 GENERAL WEAKNESS: ICD-10-CM

## 2019-01-12 DIAGNOSIS — R51.9 NONINTRACTABLE HEADACHE, UNSPECIFIED CHRONICITY PATTERN, UNSPECIFIED HEADACHE TYPE: ICD-10-CM

## 2019-01-12 DIAGNOSIS — I10 HYPERTENSION, UNSPECIFIED TYPE: Primary | ICD-10-CM

## 2019-01-12 LAB
ALBUMIN SERPL-MCNC: 4.5 G/DL (ref 3.5–5)
ALBUMIN/GLOB SERPL: 1.7 G/DL (ref 1–2)
ALP SERPL-CCNC: 63 U/L (ref 38–126)
ALT SERPL W P-5'-P-CCNC: 27 U/L (ref 13–69)
ANION GAP SERPL CALCULATED.3IONS-SCNC: 9.5 MMOL/L (ref 10–20)
AST SERPL-CCNC: 24 U/L (ref 15–46)
BACTERIA UR QL AUTO: ABNORMAL /HPF
BASOPHILS # BLD AUTO: 0.03 10*3/MM3 (ref 0–0.2)
BASOPHILS NFR BLD AUTO: 0.4 % (ref 0–2.5)
BILIRUB SERPL-MCNC: 0.5 MG/DL (ref 0.2–1.3)
BILIRUB UR QL STRIP: NEGATIVE
BUN BLD-MCNC: 12 MG/DL (ref 7–20)
BUN/CREAT SERPL: 17.1 (ref 7.1–23.5)
CALCIUM SPEC-SCNC: 9.5 MG/DL (ref 8.4–10.2)
CHLORIDE SERPL-SCNC: 104 MMOL/L (ref 98–107)
CLARITY UR: CLEAR
CO2 SERPL-SCNC: 30 MMOL/L (ref 26–30)
COLOR UR: YELLOW
CREAT BLD-MCNC: 0.7 MG/DL (ref 0.6–1.3)
DEPRECATED RDW RBC AUTO: 43.8 FL (ref 37–54)
EOSINOPHIL # BLD AUTO: 0.02 10*3/MM3 (ref 0–0.7)
EOSINOPHIL NFR BLD AUTO: 0.3 % (ref 0–7)
ERYTHROCYTE [DISTWIDTH] IN BLOOD BY AUTOMATED COUNT: 12.7 % (ref 11.5–14.5)
FLUAV AG NPH QL: NEGATIVE
FLUBV AG NPH QL IA: NEGATIVE
GFR SERPL CREATININE-BSD FRML MDRD: 83 ML/MIN/1.73
GLOBULIN UR ELPH-MCNC: 2.6 GM/DL
GLUCOSE BLD-MCNC: 126 MG/DL (ref 74–98)
GLUCOSE UR STRIP-MCNC: NEGATIVE MG/DL
HCT VFR BLD AUTO: 39.8 % (ref 37–47)
HGB BLD-MCNC: 13.4 G/DL (ref 12–16)
HGB UR QL STRIP.AUTO: NEGATIVE
HYALINE CASTS UR QL AUTO: ABNORMAL /LPF
IMM GRANULOCYTES # BLD AUTO: 0.02 10*3/MM3 (ref 0–0.06)
IMM GRANULOCYTES NFR BLD AUTO: 0.3 % (ref 0–0.6)
KETONES UR QL STRIP: NEGATIVE
LEUKOCYTE ESTERASE UR QL STRIP.AUTO: ABNORMAL
LYMPHOCYTES # BLD AUTO: 1.48 10*3/MM3 (ref 0.6–3.4)
LYMPHOCYTES NFR BLD AUTO: 21.1 % (ref 10–50)
MAGNESIUM SERPL-MCNC: 2.2 MG/DL (ref 1.6–2.3)
MCH RBC QN AUTO: 32 PG (ref 27–31)
MCHC RBC AUTO-ENTMCNC: 33.7 G/DL (ref 30–37)
MCV RBC AUTO: 95 FL (ref 81–99)
MONOCYTES # BLD AUTO: 0.5 10*3/MM3 (ref 0–0.9)
MONOCYTES NFR BLD AUTO: 7.1 % (ref 0–12)
MUCOUS THREADS URNS QL MICRO: ABNORMAL /HPF
NEUTROPHILS # BLD AUTO: 4.96 10*3/MM3 (ref 2–6.9)
NEUTROPHILS NFR BLD AUTO: 70.8 % (ref 37–80)
NITRITE UR QL STRIP: NEGATIVE
NRBC BLD AUTO-RTO: 0 /100 WBC (ref 0–0)
NT-PROBNP SERPL-MCNC: 226 PG/ML (ref 0–125)
PH UR STRIP.AUTO: 8.5 [PH] (ref 5–8)
PLATELET # BLD AUTO: 169 10*3/MM3 (ref 130–400)
PMV BLD AUTO: 9.4 FL (ref 6–12)
POTASSIUM BLD-SCNC: 3.5 MMOL/L (ref 3.5–5.1)
PROT SERPL-MCNC: 7.1 G/DL (ref 6.3–8.2)
PROT UR QL STRIP: NEGATIVE
RBC # BLD AUTO: 4.19 10*6/MM3 (ref 4.2–5.4)
RBC # UR: ABNORMAL /HPF
REF LAB TEST METHOD: ABNORMAL
SODIUM BLD-SCNC: 140 MMOL/L (ref 137–145)
SP GR UR STRIP: 1.01 (ref 1–1.03)
SQUAMOUS #/AREA URNS HPF: ABNORMAL /HPF
TRANS CELLS #/AREA URNS HPF: ABNORMAL /HPF
TROPONIN I SERPL-MCNC: <0.012 NG/ML (ref 0–0.03)
UROBILINOGEN UR QL STRIP: ABNORMAL
WBC NRBC COR # BLD: 7.01 10*3/MM3 (ref 4.8–10.8)
WBC UR QL AUTO: ABNORMAL /HPF

## 2019-01-12 PROCEDURE — 71046 X-RAY EXAM CHEST 2 VIEWS: CPT

## 2019-01-12 PROCEDURE — 83735 ASSAY OF MAGNESIUM: CPT | Performed by: EMERGENCY MEDICINE

## 2019-01-12 PROCEDURE — 25010000002 KETOROLAC TROMETHAMINE PER 15 MG: Performed by: EMERGENCY MEDICINE

## 2019-01-12 PROCEDURE — 96375 TX/PRO/DX INJ NEW DRUG ADDON: CPT

## 2019-01-12 PROCEDURE — 84484 ASSAY OF TROPONIN QUANT: CPT | Performed by: EMERGENCY MEDICINE

## 2019-01-12 PROCEDURE — 96374 THER/PROPH/DIAG INJ IV PUSH: CPT

## 2019-01-12 PROCEDURE — 87086 URINE CULTURE/COLONY COUNT: CPT | Performed by: EMERGENCY MEDICINE

## 2019-01-12 PROCEDURE — 81001 URINALYSIS AUTO W/SCOPE: CPT | Performed by: EMERGENCY MEDICINE

## 2019-01-12 PROCEDURE — 87186 SC STD MICRODIL/AGAR DIL: CPT | Performed by: EMERGENCY MEDICINE

## 2019-01-12 PROCEDURE — 87804 INFLUENZA ASSAY W/OPTIC: CPT | Performed by: EMERGENCY MEDICINE

## 2019-01-12 PROCEDURE — 80053 COMPREHEN METABOLIC PANEL: CPT | Performed by: EMERGENCY MEDICINE

## 2019-01-12 PROCEDURE — 85025 COMPLETE CBC W/AUTO DIFF WBC: CPT | Performed by: EMERGENCY MEDICINE

## 2019-01-12 PROCEDURE — 87077 CULTURE AEROBIC IDENTIFY: CPT | Performed by: EMERGENCY MEDICINE

## 2019-01-12 PROCEDURE — 87147 CULTURE TYPE IMMUNOLOGIC: CPT | Performed by: EMERGENCY MEDICINE

## 2019-01-12 PROCEDURE — 93005 ELECTROCARDIOGRAM TRACING: CPT | Performed by: EMERGENCY MEDICINE

## 2019-01-12 PROCEDURE — 99284 EMERGENCY DEPT VISIT MOD MDM: CPT

## 2019-01-12 PROCEDURE — 25010000002 HYDRALAZINE PER 20 MG: Performed by: EMERGENCY MEDICINE

## 2019-01-12 PROCEDURE — 36415 COLL VENOUS BLD VENIPUNCTURE: CPT | Performed by: EMERGENCY MEDICINE

## 2019-01-12 PROCEDURE — 83880 ASSAY OF NATRIURETIC PEPTIDE: CPT | Performed by: EMERGENCY MEDICINE

## 2019-01-12 RX ORDER — CEPHALEXIN 500 MG/1
500 CAPSULE ORAL 2 TIMES DAILY
Qty: 14 CAPSULE | Refills: 0 | Status: SHIPPED | OUTPATIENT
Start: 2019-01-12 | End: 2019-01-16

## 2019-01-12 RX ORDER — BUTALBITAL, ACETAMINOPHEN AND CAFFEINE 50; 325; 40 MG/1; MG/1; MG/1
1 TABLET ORAL EVERY 6 HOURS PRN
Qty: 10 TABLET | Refills: 0 | Status: SHIPPED | OUTPATIENT
Start: 2019-01-12 | End: 2019-02-13

## 2019-01-12 RX ORDER — HYDRALAZINE HYDROCHLORIDE 20 MG/ML
10 INJECTION INTRAMUSCULAR; INTRAVENOUS ONCE
Status: COMPLETED | OUTPATIENT
Start: 2019-01-12 | End: 2019-01-12

## 2019-01-12 RX ORDER — CLONIDINE HYDROCHLORIDE 0.1 MG/1
0.1 TABLET ORAL 2 TIMES DAILY PRN
Qty: 20 TABLET | Refills: 0 | Status: SHIPPED | OUTPATIENT
Start: 2019-01-12 | End: 2019-02-18

## 2019-01-12 RX ORDER — BUTALBITAL, ACETAMINOPHEN AND CAFFEINE 50; 325; 40 MG/1; MG/1; MG/1
1 TABLET ORAL ONCE
Status: COMPLETED | OUTPATIENT
Start: 2019-01-12 | End: 2019-01-12

## 2019-01-12 RX ORDER — CEPHALEXIN 250 MG/1
500 CAPSULE ORAL ONCE
Status: COMPLETED | OUTPATIENT
Start: 2019-01-12 | End: 2019-01-12

## 2019-01-12 RX ORDER — KETOROLAC TROMETHAMINE 30 MG/ML
10 INJECTION, SOLUTION INTRAMUSCULAR; INTRAVENOUS ONCE
Status: COMPLETED | OUTPATIENT
Start: 2019-01-12 | End: 2019-01-12

## 2019-01-12 RX ORDER — LOSARTAN POTASSIUM 100 MG/1
100 TABLET ORAL DAILY
Qty: 90 TABLET | Refills: 0 | Status: SHIPPED | OUTPATIENT
Start: 2019-01-12 | End: 2019-11-20

## 2019-01-12 RX ADMIN — BUTALBITAL, ACETAMINOPHEN AND CAFFEINE 1 TABLET: 50; 325; 40 TABLET ORAL at 18:48

## 2019-01-12 RX ADMIN — CEPHALEXIN 500 MG: 250 CAPSULE ORAL at 18:48

## 2019-01-12 RX ADMIN — HYDRALAZINE HYDROCHLORIDE 10 MG: 20 INJECTION INTRAMUSCULAR; INTRAVENOUS at 16:22

## 2019-01-12 RX ADMIN — KETOROLAC TROMETHAMINE 10 MG: 30 INJECTION INTRAMUSCULAR; INTRAVENOUS at 16:20

## 2019-01-12 NOTE — ED PROVIDER NOTES
TRIAGE CHIEF COMPLAINT:     Nursing and triage notes reviewed    Chief Complaint   Patient presents with   • Hypertension      HPI: Nery Garcia is a 68 y.o. female who presents to the emergency department complaining of hypertension and generalized weakness.  The patient had been on multiple antihypertensive medications but her losartan had been recalled recently and she is been without this for approximately the past week.  Patient states that she had called her primary care physician but they have not responded as of yet and have no new prescription.  Patient knows her blood pressure is been running high the past few days especially this morning.  During this time she is felt generally weak with no energy.  She has had a mild diffuse headache during this time.  She denies any changes in her vision.  She denies focal weakness.  No numbness or tingling in her extremities.  She is been somewhat short of breath with exertion.  She denies any chest pain.  She denies coughing.  No fevers or chills.  No pain, nausea, or vomiting.     REVIEW OF SYSTEMS: All other systems reviewed and are negative     PAST MEDICAL HISTORY:   Past Medical History:   Diagnosis Date   • Acute bronchitis    • Acute otitis media    • Anxiety    • Atrial fibrillation (CMS/HCC)    • Body piercing     EARS   • CAD (coronary artery disease)    • CAD (coronary artery disease)    • Cardiac insufficiency (CMS/HCC)    • Chest pain    • Colitis    • COPD (chronic obstructive pulmonary disease) (CMS/HCC)    • Dementia     PATIENT REPORTS VERY EARLY STAGES OF THIS.  REPORTS CURRENTLY LIVES BY HERSELF.   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 BUT DOES NOT WEAR THEM ANYMORE   • Hepatitis     REPORTS AS A CHILD, UNSURE TYPE   • History of esophageal reflux    • History of obesity    •  Hyperlipidemia    • Hypertension    • Iron deficiency anemia    • Obesity      TRUNKAL   • On home oxygen therapy     REPORTS SHE WEARS AT 1L [facility]   • Otitis externa    • Peptic ulcer disease     noted on EGD, 2013   • Pneumonia    • Pulmonary embolus (CMS/HCC) 2012   • RLS (restless legs syndrome)    • Wears glasses    • Wears partial dentures     FULL UPPER PLATE AND PARTIAL LOWER PLATE        FAMILY HISTORY:   Family History   Problem Relation Age of Onset   • Heart attack Mother    • Stroke Mother    • Heart attack Father    • Stroke Father    • Heart attack Brother    • Breast cancer Neg Hx    • Ovarian cancer Neg Hx    • Colon cancer Neg Hx         SOCIAL HISTORY:   Social History     Socioeconomic History   • Marital status:      Spouse name: Not on file   • Number of children: Not on file   • Years of education: Not on file   • Highest education level: Not on file   Social Needs   • Financial resource strain: Not on file   • Food insecurity - worry: Not on file   • Food insecurity - inability: Not on file   • Transportation needs - medical: Not on file   • Transportation needs - non-medical: Not on file   Occupational History   • Not on file   Tobacco Use   • Smoking status: Former Smoker     Packs/day: 3.00     Years: 35.00     Pack years: 105.00     Types: Cigarettes     Last attempt to quit:      Years since quittin.0   • Smokeless tobacco: Never Used   Substance and Sexual Activity   • Alcohol use: Yes     Comment: REPORTS HAS SEVERAL MIXED DRINKS WEEKLY   • Drug use: No   • Sexual activity: Defer   Other Topics Concern   • Not on file   Social History Narrative    Pt consumes 2-3 servings of caffeine per day.         SURGICAL HISTORY:   Past Surgical History:   Procedure Laterality Date   • BREAST EXCISIONAL BIOPSY Bilateral     Patient unsure of dates-    • BREAST SURGERY Bilateral     lumpectomies, PATIENT REPORTS BENIGN AND THAT THERE ARE NO RESTRICTIONS ON BUE'S   •  CATARACT EXTRACTION Bilateral    • COLONOSCOPY N/A 9/27/2017    Procedure: COLONOSCOPY WITH COLD BIOPSY POLYPECTOMY; BIOPSIES;  Surgeon: Severiano Calloway MD;  Location: [facility] ENDOSCOPY;  Service:    • CORONARY ANGIOPLASTY  10/01/2012    Nonobstructive coronary artery disease with normal left ventricular function    • HYSTERECTOMY     • OOPHORECTOMY          CURRENT MEDICATIONS:      Medication List      ASK your doctor about these medications    amLODIPine 10 MG tablet  Commonly known as:  NORVASC  Take 1 tablet by mouth Daily.     apixaban 5 MG tablet tablet  Commonly known as:  ELIQUIS     aspirin 81 MG tablet     atorvastatin 40 MG tablet  Commonly known as:  LIPITOR  Take 1 tablet by mouth Daily.     carbidopa-levodopa  MG per tablet  Commonly known as:  SINEMET     citalopram 20 MG tablet  Commonly known as:  CeleXA     donepezil 10 MG tablet  Commonly known as:  ARICEPT  Take 1 tablet by mouth every night.     escitalopram 20 MG tablet  Commonly known as:  LEXAPRO     fluticasone 50 MCG/ACT nasal spray  Commonly known as:  FLONASE     losartan 100 MG tablet  Commonly known as:  COZAAR  Take 1 tablet by mouth Daily.     memantine 5 MG tablet  Commonly known as:  NAMENDA     montelukast 10 MG tablet  Commonly known as:  SINGULAIR  Take 1 tablet by mouth Daily.     MULTI-DAY VITAMINS tablet     O2  Commonly known as:  OXYGEN     OSCAL 500/200 D-3 500-200 MG-UNIT per tablet  Generic drug:  calcium-vitamin D     pantoprazole 40 MG EC tablet  Commonly known as:  PROTONIX  Take 1 tablet by mouth Daily.     sotalol 80 MG tablet  Commonly known as:  BETAPACE  TAKE ONE-HALF TABLET BY MOUTH TWICE DAILY     sulfamethoxazole-trimethoprim 800-160 MG per tablet  Commonly known as:  BACTRIM DS  Take 1 tablet by mouth 2 (Two) Times a Day.     vitamin B-12 1000 MCG tablet  Commonly known as:  CYANOCOBALAMIN           ALLERGIES: Lisinopril     PHYSICAL EXAM:   VITAL SIGNS:   Vitals:    01/12/19 1522   BP: (!) 181/76    Pulse: 86   Resp: 16   Temp: 98.2 °F (36.8 °C)   SpO2: 97%      CONSTITUTIONAL: Awake, oriented, appears non-toxic   HENT: Atraumatic, normocephalic, oral mucosa pink and moist, airway patent.  EYES: Conjunctiva clear, EOMI, PERRL   NECK: Trachea midline   CARDIOVASCULAR: Normal heart rate, Normal rhythm, No murmurs, rubs, gallops   PULMONARY/CHEST: Clear to auscultation, no rhonchi, wheezes, or rales. Symmetrical breath sounds.  No increased work of breathing.  ABDOMINAL: Non-distended, soft, non-tender - no rebound or guarding.  NEUROLOGIC: Non-focal, moving all four extremities, no gross sensory or motor deficits.  No facial droop noted.  Cranial nerves are intact.  There is normal strength and sensation in the upper and lower extremities bilaterally.  EXTREMITIES: No clubbing, cyanosis, or edema   SKIN: Warm, Dry, No erythema, No rash     ED COURSE / MEDICAL DECISION MAKING:   Nery Garcia is a 68 y.o. female who presents to the emergency department for evaluation of generalized weakness and elevated blood pressure.  Patient states at home her blood pressure had been over 200 systolic.  Patient has blood pressure 181/76 on arrival.  Other vital signs are stable.  Patient has a normal neurological exam on arrival.  EKG obtained on arrival was interpreted by me and reveals sinus rhythm with a right bundle branch block.  There is a rate of 80 bpm.  There are some nonspecific ST-T wave changes.  EKG is largely similar to patient's previous EKG.  This is an abnormal-appearing EKG.  We'll obtain a chest x-ray and labs for further evaluation.  We'll treat patient's blood pressure and headache.  Laboratory tests reveal a slight urinary infection but otherwise were unremarkable.  Chest x-ray did not reveal any acute cardiopulmonary process.  Patient's blood pressure improved with treatment.  We'll provide medication for her headache as well as her urinary tract infection.  We will discharge with strict return  precautions.    DECISION TO DISCHARGE/ADMIT: see ED care timeline     FINAL IMPRESSION:   1 -- hypertension   2 -- headache  3 -- urinary tract infection    Electronically signed by: Tayler Chong MD, 1/12/2019 3:32 PM       Tayler Chong MD  01/12/19 8840

## 2019-01-13 ENCOUNTER — EPISODE CHANGES (OUTPATIENT)
Dept: CASE MANAGEMENT | Facility: OTHER | Age: 69
End: 2019-01-13

## 2019-01-14 ENCOUNTER — PATIENT OUTREACH (OUTPATIENT)
Dept: CASE MANAGEMENT | Facility: OTHER | Age: 69
End: 2019-01-14

## 2019-01-14 NOTE — OUTREACH NOTE
Unable to Reach patient/ attempt x 1 following ED visit 1-12-19.  Left voicemail with Care Advisor contact information.

## 2019-01-15 LAB — BACTERIA SPEC AEROBE CULT: ABNORMAL

## 2019-01-16 ENCOUNTER — PATIENT OUTREACH (OUTPATIENT)
Dept: CASE MANAGEMENT | Facility: OTHER | Age: 69
End: 2019-01-16

## 2019-01-16 ENCOUNTER — OFFICE VISIT (OUTPATIENT)
Dept: INTERNAL MEDICINE | Facility: CLINIC | Age: 69
End: 2019-01-16

## 2019-01-16 VITALS
WEIGHT: 196 LBS | HEIGHT: 67 IN | OXYGEN SATURATION: 98 % | HEART RATE: 62 BPM | BODY MASS INDEX: 30.76 KG/M2 | DIASTOLIC BLOOD PRESSURE: 66 MMHG | SYSTOLIC BLOOD PRESSURE: 130 MMHG | TEMPERATURE: 97.8 F

## 2019-01-16 DIAGNOSIS — K21.9 GASTROESOPHAGEAL REFLUX DISEASE WITHOUT ESOPHAGITIS: ICD-10-CM

## 2019-01-16 DIAGNOSIS — I10 ESSENTIAL HYPERTENSION: Primary | ICD-10-CM

## 2019-01-16 DIAGNOSIS — F32.0 CURRENT MILD EPISODE OF MAJOR DEPRESSIVE DISORDER WITHOUT PRIOR EPISODE (HCC): ICD-10-CM

## 2019-01-16 DIAGNOSIS — E78.2 MIXED HYPERLIPIDEMIA: ICD-10-CM

## 2019-01-16 DIAGNOSIS — F03.90 DEMENTIA WITHOUT BEHAVIORAL DISTURBANCE, UNSPECIFIED DEMENTIA TYPE: ICD-10-CM

## 2019-01-16 DIAGNOSIS — I48.0 PAROXYSMAL ATRIAL FIBRILLATION (HCC): ICD-10-CM

## 2019-01-16 PROCEDURE — 99214 OFFICE O/P EST MOD 30 MIN: CPT | Performed by: INTERNAL MEDICINE

## 2019-01-16 RX ORDER — CIPROFLOXACIN 250 MG/1
250 TABLET, FILM COATED ORAL 2 TIMES DAILY
Qty: 14 TABLET | Refills: 0 | Status: SHIPPED | OUTPATIENT
Start: 2019-01-16 | End: 2019-01-23

## 2019-01-16 NOTE — PROGRESS NOTES
Chief Complaint   Patient presents with   • Follow-up     6 months for depression, GERD, HLD, and HTN. Pt went to ER on Saturday, given DX of UTI, Pt is currently taking Keflex for this. Pt states she's having SOA and ear problems today.      Subjective   Nery Garcia is a 68 y.o. female.     Here today for follow up of HTN, HLD, Afib, CAD, carotid disease, COPD, PAT, GERD, dementia, anx/depression, RLS.   Patient was seen in the emergency room several days ago for hypertension.  She was noted to have urinary tract infection and was started on Keflex.  Urine culture is growing staph that is resistant to Keflex.  Patients blood pressure was elevated at 181/76 on admission to ER, she had run out of cozaar for a week.   All labs were in acceptable range other than urinalysis.  Patients blood pressure was treated and she was discharged.  Patient remains on all regular blood pressure medication including Cozaar.  HTN/HLD/Afib/CAD- her BP is well controlled today and is doing well at home.  No CP.  She complains of SOA for past 3 days.  No edema.   COPD/PAT- she is not using any inhalers.  She is using oxygen at night  GERD- she remains on protonix daily  Dementia- she remains on aricept and namenda  Anx/depr- she has been sad recently, had depression over the holidays.  She sleeps well overall.   RLS- she denies any RLS sxs. She does have an occasional tremor.    HCM- Mammogram is due.  DEXA is UTD.  Flu shot UTD.   Pt complains of right EA.  Has perforated right TM for many yrs. Occasional drainage.  No URI sxs.  She complains of SOA, no cough.  Some PND.  No F/C.           The following portions of the patient's history were reviewed and updated as appropriate: allergies, current medications, past family history, past medical history, past social history, past surgical history and problem list.    Review of Systems   Constitutional: Negative for activity change, appetite change and unexpected weight change.  "  HENT: Positive for ear discharge, ear pain and postnasal drip. Negative for sore throat.    Respiratory: Positive for shortness of breath.    Cardiovascular: Negative for chest pain, palpitations and leg swelling.   Gastrointestinal: Negative for abdominal pain.   Genitourinary: Negative for dysuria and frequency.   Neurological: Negative for headaches.   Psychiatric/Behavioral: Positive for confusion and dysphoric mood. Negative for sleep disturbance. The patient is not nervous/anxious.    All other systems reviewed and are negative.      Objective   /66   Pulse 62   Temp 97.8 °F (36.6 °C)   Ht 170.2 cm (67\")   Wt 88.9 kg (196 lb)   SpO2 98%   BMI 30.70 kg/m²   Body mass index is 30.7 kg/m².  Physical Exam   Constitutional: She appears well-developed and well-nourished. No distress.   Pleasant female, appears older than stated age   HENT:   Head: Normocephalic and atraumatic.   Left Ear: External ear normal.   Mouth/Throat: Oropharynx is clear and moist.   Right TM perforation with mild erythema noted   Eyes: Conjunctivae and EOM are normal. Pupils are equal, round, and reactive to light.   Neck: Normal range of motion. Neck supple. No thyromegaly present.   Cardiovascular: Normal rate, regular rhythm, normal heart sounds and intact distal pulses.   No murmur heard.  Faint left carotid bruit   Pulmonary/Chest: Effort normal and breath sounds normal. No respiratory distress. She has no wheezes. She has no rales.   Abdominal: Soft. Bowel sounds are normal. She exhibits no distension. There is no tenderness.   No suprapubic or CVAT   Musculoskeletal: Normal range of motion. She exhibits no edema.   Lymphadenopathy:     She has no cervical adenopathy.   Neurological: She is alert. No cranial nerve deficit. Coordination normal.   Psychiatric: Her behavior is normal. Thought content normal.   Flat affect noted, some confusion with questioning noted   Nursing note and vitals reviewed.      Assessment/Plan "   Nery Garcia is here today and the following problems have been addressed:      Nery was seen today for follow-up.    Diagnoses and all orders for this visit:    Essential hypertension    Mixed hyperlipidemia    Paroxysmal atrial fibrillation (CMS/HCC)    Gastroesophageal reflux disease without esophagitis    Dementia without behavioral disturbance, unspecified dementia type    Current mild episode of major depressive disorder without prior episode (CMS/HCC)    Other orders  -     carbidopa-levodopa (SINEMET)  MG per tablet; Take 1 tablet by mouth Every Night.  -     ciprofloxacin (CIPRO) 250 MG tablet; Take 1 tablet by mouth 2 (Two) Times a Day for 7 days.        Follow heart healthy/low salt diet  Avoid processed foods  Monitor blood pressure as discussed  Exercise as tolerated up to 30 minutes 5 days per week  Take all medications as prescribed  Recommend Hep A and shingles vaccine at pharmacy  She will schedule mammogram  Change keflex to cipro for current UTI-will also help current right ear sxs  No abnormalities on lung exam, suspect her SOA is deconditioning vs mild CHF as her BNP was slightly elevated likely due to poorly controlled BP for a week-daughter was told to bring pt back if sxs worsened.  XR chest was normal last week in ER.   Reviewed recent ER labs    Return in about 6 months (around 7/16/2019) for Medicare Wellness.      Marilyn K. Vermeesch, MD      Please note that portions of this note were completed with a voice recognition program.  Efforts were made to edit dictation, but occasionally words are mistranscribed.

## 2019-01-16 NOTE — OUTREACH NOTE
Care Management Plan 1/16/2019   Lifestyle Goals Routine follow-up with doctor(s)   Barriers Other (See Comment)   Barriers has Dementia, and gets things mixed up easily   Self Management Medication Adherence   Annual Wellness Visit:  Patient Has Completed   Care Gaps Addressed Pneumonia Vaccine   Care Gaps Addressed Daughter states patient has had the Pneumonia vaccine, and they need to get the Zoster and Hep-A vaccines.   Specific Disease Process Teaching Hypertension   Does patient have depression diagnosis? Yes   Advanced Directives: Patient Has   Ed Visits past 12 months: 1   Hospitalizations past 12 months None   Medication Adherence Medications understood   Medication Adherence Daughter who fills the pill-box weekly understands the medications   Health Literacy Good   Health Literacy Daughter who fills the pill-box weekly has good health literacy.     The main concerns and/or symptoms the patient would like to address are:   Daughter, Rama, answered the phone, as patient's contact # belongs to Rama.  Daughter stated that due to Dementia, patient gets things mixed up talking on the phone, so all calls go through the daughter.  Rama stated patient is feeling better now, with no complaints or symptoms, regarding the ED visit 1-12-19.  Patient lives alone; has 2 daughters that help her manage well.  Daughter fills pill-box weekly appropriately, and patient is compliant to take the medications daily, with reminder phone calls from the family.  Patient is independent with ADL's, & Mobility (no DME).  Family takes care of patient's food.  Patient has a BP monitor at home, and checks her BP daily and keeps log; is active daily, bowls 3 days/week and likes to shop.  Daughter stated phone calls to the patient are not necessary and would cause confusion.  She voiced appreciation of Care Advisor's follow up.    Education/instruction provided by Care Coordinator:   Reviewed ED instructions.  Patient was taking  the Keflex as directed.  Daughter states they went to PCP appt this AM and the Abx. Was changed to a different one; she plans to pick it up today and get it to patient to start taking (for UTI).  Reviewed Gaps in Care: AWV is up to date; reviewed other Care Gaps (see above).  Discussed HTN management.  Discussed Advanced Directives; patient already has in place.  Encouraged daughter to take a copy to the PCP office to be put on file.  She voiced understanding.  My Chart is active; no questions regarding use.        Follow Up Outreach Due: as needed.    Avis Armenta RN

## 2019-01-23 ENCOUNTER — EPISODE CHANGES (OUTPATIENT)
Dept: CASE MANAGEMENT | Facility: OTHER | Age: 69
End: 2019-01-23

## 2019-02-13 ENCOUNTER — OFFICE VISIT (OUTPATIENT)
Dept: INTERNAL MEDICINE | Facility: CLINIC | Age: 69
End: 2019-02-13

## 2019-02-13 VITALS
DIASTOLIC BLOOD PRESSURE: 75 MMHG | BODY MASS INDEX: 32.02 KG/M2 | WEIGHT: 204 LBS | SYSTOLIC BLOOD PRESSURE: 127 MMHG | HEART RATE: 55 BPM | TEMPERATURE: 95.7 F | OXYGEN SATURATION: 97 % | HEIGHT: 67 IN

## 2019-02-13 DIAGNOSIS — K62.5 BRBPR (BRIGHT RED BLOOD PER RECTUM): ICD-10-CM

## 2019-02-13 DIAGNOSIS — R06.09 DYSPNEA ON EXERTION: ICD-10-CM

## 2019-02-13 DIAGNOSIS — E86.0 DEHYDRATION: Primary | ICD-10-CM

## 2019-02-13 LAB
ALBUMIN SERPL-MCNC: 4.1 G/DL (ref 3.5–5)
ALBUMIN/GLOB SERPL: 1.7 G/DL (ref 1–2)
ALP SERPL-CCNC: 59 U/L (ref 38–126)
ALT SERPL-CCNC: 20 U/L (ref 13–69)
AST SERPL-CCNC: 21 U/L (ref 15–46)
BASOPHILS # BLD AUTO: 0.05 10*3/MM3 (ref 0–0.2)
BASOPHILS NFR BLD AUTO: 0.8 % (ref 0–2.5)
BILIRUB SERPL-MCNC: 0.4 MG/DL (ref 0.2–1.3)
BUN SERPL-MCNC: 17 MG/DL (ref 7–20)
BUN/CREAT SERPL: 21.3 (ref 7.1–23.5)
CALCIUM SERPL-MCNC: 9.4 MG/DL (ref 8.4–10.2)
CHLORIDE SERPL-SCNC: 104 MMOL/L (ref 98–107)
CO2 SERPL-SCNC: 29 MMOL/L (ref 26–30)
CREAT SERPL-MCNC: 0.8 MG/DL (ref 0.6–1.3)
EOSINOPHIL # BLD AUTO: 0.36 10*3/MM3 (ref 0–0.7)
EOSINOPHIL NFR BLD AUTO: 5.5 % (ref 0–7)
ERYTHROCYTE [DISTWIDTH] IN BLOOD BY AUTOMATED COUNT: 12.8 % (ref 11.5–14.5)
GLOBULIN SER CALC-MCNC: 2.4 GM/DL
GLUCOSE SERPL-MCNC: 113 MG/DL (ref 74–98)
HCT VFR BLD AUTO: 40.2 % (ref 37–47)
HGB BLD-MCNC: 12.9 G/DL (ref 12–16)
IMM GRANULOCYTES # BLD AUTO: 0.01 10*3/MM3 (ref 0–0.06)
IMM GRANULOCYTES NFR BLD AUTO: 0.2 % (ref 0–0.6)
LYMPHOCYTES # BLD AUTO: 2.54 10*3/MM3 (ref 0.6–3.4)
LYMPHOCYTES NFR BLD AUTO: 38.6 % (ref 10–50)
MCH RBC QN AUTO: 31.2 PG (ref 27–31)
MCHC RBC AUTO-ENTMCNC: 32.1 G/DL (ref 30–37)
MCV RBC AUTO: 97.1 FL (ref 81–99)
MONOCYTES # BLD AUTO: 0.47 10*3/MM3 (ref 0–0.9)
MONOCYTES NFR BLD AUTO: 7.1 % (ref 0–12)
NEUTROPHILS # BLD AUTO: 3.15 10*3/MM3 (ref 2–6.9)
NEUTROPHILS NFR BLD AUTO: 47.8 % (ref 37–80)
NRBC BLD AUTO-RTO: 0 /100 WBC (ref 0–0)
PLATELET # BLD AUTO: 165 10*3/MM3 (ref 130–400)
POTASSIUM SERPL-SCNC: 5 MMOL/L (ref 3.5–5.1)
PROT SERPL-MCNC: 6.5 G/DL (ref 6.3–8.2)
RBC # BLD AUTO: 4.14 10*6/MM3 (ref 4.2–5.4)
SODIUM SERPL-SCNC: 139 MMOL/L (ref 137–145)
WBC # BLD AUTO: 6.58 10*3/MM3 (ref 4.8–10.8)

## 2019-02-13 PROCEDURE — 99214 OFFICE O/P EST MOD 30 MIN: CPT | Performed by: PHYSICIAN ASSISTANT

## 2019-02-13 NOTE — PROGRESS NOTES
Subjective     Chief Complaint   Patient presents with   • Headache     3 days   • Vomiting     3 days   • Hematochezia     after every BM   • Shortness of Breath     one month       History of Present Illness     Nery Garcia is a 68 y.o. female presenting with complaints of a mild headache, nausea, several episodes of vomiting, and bright red blood on tissue following bowel movements ×3 days.  She is here today with her daughter Arianna.  Patient has a history of dementia and is unable to provide most of the history so family is helping.  States that patient felt well enough to go bowling and ate out a good dinner last night.  Patient believes she had coffee this morning but has not eaten anything yet.  She does not drink very much water, typically drinks diet soda.  She denies any fevers, ear pain, sore throat, cough, chest pain, palpitations, any abdominal pain, constipation, dysuria or hematuria.    Patient did have a colonoscopy in late 2017 with Dr. Calloway which showed diverticulosis and both internal and external hemorrhoids.  She has had intermittent episodes of hemorrhoids bleeding over the past few years.  She did have an episode of colitis in 2017 for which she was hospitalized prior to the colonoscopy, was treated with Levaquin and Flagyl.  Patient and family do not feel this is similar.  They do note at one time they debated taking patient off of Eliquis and aspirin due to the recurrent issues with bleeding after bowel movements.  She was not noted to be anemic on last labs.    They also note that patient has been more short of breath with exertion over the past month or so.  At last office visit, Dr. Vermeesch felt this could be due to deconditioning or even mild CHF.  Patient also has a history of COPD listed.  She wears oxygen at nighttime but does not use any inhalers or nap treatments throughout the day.  She does not exercise other than the 2 hours she spends bowling each week.  She denies  "any lower extremity swelling, chest pain, palpitations, orthopnea or PND.      The following portions of the patient's history were reviewed and updated as appropriate: current medications, allergies, PMH.    Review of Systems   Constitutional: Negative for appetite change, chills, fatigue, fever and unexpected weight change.   HENT: Negative for congestion, ear pain, hearing loss, nosebleeds, sinus pressure, sore throat, tinnitus and trouble swallowing.    Eyes: Negative for pain, discharge, redness, itching and visual disturbance.   Respiratory: Negative for cough, chest tightness, shortness of breath and wheezing.    Cardiovascular: Negative for chest pain, palpitations and leg swelling.   Gastrointestinal: Positive for anal bleeding, diarrhea, nausea and vomiting. Negative for abdominal pain, blood in stool and constipation.   Endocrine: Negative for cold intolerance, heat intolerance, polydipsia, polyphagia and polyuria.   Genitourinary: Negative for decreased urine volume, dysuria, flank pain, frequency and hematuria.   Musculoskeletal: Negative for arthralgias, back pain, gait problem, joint swelling, myalgias, neck pain and neck stiffness.   Skin: Negative for color change and rash.   Allergic/Immunologic: Negative for environmental allergies, food allergies and immunocompromised state.   Neurological: Positive for headaches. Negative for dizziness, syncope, weakness and light-headedness.   Hematological: Negative for adenopathy. Does not bruise/bleed easily.   Psychiatric/Behavioral: Negative for dysphoric mood, sleep disturbance and suicidal ideas. The patient is not nervous/anxious.        Objective     Vitals:    02/13/19 0940   BP: 127/75   Pulse: 55   Temp: 95.7 °F (35.4 °C)   SpO2: 97%   Weight: 92.5 kg (204 lb)   Height: 170.2 cm (67.01\")       Physical Exam   Constitutional: She appears well-developed and well-nourished. No distress.   Overall patient looks well.  Vitals stable.   HENT:   Nose: Nose " normal.   Mouth/Throat: Oropharynx is clear and moist.   Eyes: EOM are normal. Pupils are equal, round, and reactive to light.   Neck: Normal range of motion. Neck supple.   Cardiovascular: Normal rate and regular rhythm.   Pulmonary/Chest: Effort normal and breath sounds normal.   Abdominal: Soft. Bowel sounds are normal. She exhibits no distension. There is no tenderness. There is no CVA tenderness.   Musculoskeletal: Normal range of motion.   Lymphadenopathy:     She has no cervical adenopathy.   Neurological: She is alert.   Patient confused at times and answering questions though family states this is her baseline with history of dementia.   Skin: Skin is warm and dry.   Psychiatric: She has a normal mood and affect.       Assessment/Plan     Diagnoses and all orders for this visit:    Dehydration  -     CBC & Differential  -     Comprehensive Metabolic Panel    Concern for dehydration as patient states she does not drink water, typically only Diet Coke and coffee.  This in addition to a few episodes of vomiting and looser stools could lead to dehydration from patient.    BRBPR (bright red blood per rectum)  -     CBC & Differential  -     Comprehensive Metabolic Panel    Evaluate for anemia.  Patient states typically when she has had blood on the tissue and some in the toilet bowl it has still been a small amount.  They had questioned stopping her Eliquis and aspirin at one point in the past due to bleeding.  I discussed with patient and family I would prefer not to make any changes to her medication at this time as it has only been a couple of episodes of bleeding and I would like to see her CBC.  She was not anemic last month. I will further discuss this with Dr. Vermeesch when labs have resulted. Consider referring back to Dr. Calloway as well.    Dyspnea on exertion    Patient does have neb treatments at home to be used as needed that she has not tried.  Discussed how her shortness of breath with exertion  could be related to deconditioning.  It also appears she has an appointment in April with Dr. Kendall in which she was supposed to receive an echo.  Discussed with family getting in with cardiology sooner as sometimes shortness of breath is related to cardiac issues as well.    Will contact family regarding each of these issues following lab results.      Andreea Cabrera PA-C  02/13/2019         Please note that portions of this note were completed with a voice recognition program. Efforts were made to edit dictation, but occasionally words are mistranscribed.

## 2019-02-18 ENCOUNTER — OFFICE VISIT (OUTPATIENT)
Dept: CARDIOLOGY | Facility: CLINIC | Age: 69
End: 2019-02-18

## 2019-02-18 VITALS
DIASTOLIC BLOOD PRESSURE: 58 MMHG | BODY MASS INDEX: 30.86 KG/M2 | OXYGEN SATURATION: 97 % | SYSTOLIC BLOOD PRESSURE: 140 MMHG | HEART RATE: 56 BPM | HEIGHT: 68 IN | WEIGHT: 203.6 LBS

## 2019-02-18 DIAGNOSIS — G45.9 TIA (TRANSIENT ISCHEMIC ATTACK): ICD-10-CM

## 2019-02-18 DIAGNOSIS — I48.0 PAROXYSMAL ATRIAL FIBRILLATION (HCC): ICD-10-CM

## 2019-02-18 DIAGNOSIS — I25.10 CORONARY ARTERY DISEASE INVOLVING NATIVE CORONARY ARTERY OF NATIVE HEART WITHOUT ANGINA PECTORIS: Primary | ICD-10-CM

## 2019-02-18 DIAGNOSIS — R09.89 OTHER SPECIFIED SYMPTOMS AND SIGNS INVOLVING THE CIRCULATORY AND RESPIRATORY SYSTEMS: ICD-10-CM

## 2019-02-18 DIAGNOSIS — I10 ESSENTIAL HYPERTENSION: ICD-10-CM

## 2019-02-18 DIAGNOSIS — I67.9 CVD (CEREBROVASCULAR DISEASE): ICD-10-CM

## 2019-02-18 PROCEDURE — 93000 ELECTROCARDIOGRAM COMPLETE: CPT | Performed by: INTERNAL MEDICINE

## 2019-02-18 PROCEDURE — 99213 OFFICE O/P EST LOW 20 MIN: CPT | Performed by: INTERNAL MEDICINE

## 2019-02-18 NOTE — PROGRESS NOTES
[facility]  Office Progress Note  Nery Garcia  1950        Visit Date: 2/18/2019      PCP: Vermeesch, Marilyn K, MD  [address] [place], [place] [id number]    IDENTIFICATION: A 68 y.o. female  retired  from [employer]    Chief Complaint   Patient presents with   • Shortness of Breath   • Migraine     had for several weeks       PROBLEM LIST:  1. Carotid artery disease.  a. Carotid MRA, January 2012, revealing 50% to 75% right ICA stenosis.  b. April 2018, carotid duplex with 70% stenosis on the right, less than 50% on the left. Dr Jackson saldivar, pt deferred future fu   2. Nonobstructive coronary artery disease with normal left ventricular function per cardiac catheterization in October 2012.  3. Atrial fibrillation.  a. Initial diagnosis in November 2010.  b. Previous Multaq therapy - currently on propafenone.  c. CHADS score equal to 1 with Pradaxa therapy.  4. Pulmonary embolus, January 2012.  5. Hypertension - labile.  6. Dyslipidemia.  a. July 2014: Total cholesterol 195, triglycerides 110, HDL 64, .   7. History of tobacco abuse with cessation in 2006.  8. Chronic obstructive pulmonary disease.  9. Gastroesophageal reflux disease.  10. Peptic ulcer disease noted on EGD, November 2013.  11. Restless leg syndrome.  12. Anxiety.  13. Fatty liver per evaluation, November 2013.  14. Dementia followed per Dr. Chandler at [facility]. 3 sisters with precocious dementia  15. Questionable sleep apnea.  16. Surgical history.  a. Hysterectomy.  b. Bilateral breast lumpectomies (benign).  Benign cataract extractions.    Allergies  Allergies   Allergen Reactions   • Lisinopril Cough       Current Medications    Current Outpatient Medications:   •  amLODIPine (NORVASC) 10 MG tablet, Take 1 tablet by mouth Daily., Disp: 90 tablet, Rfl: 1  •  apixaban (ELIQUIS) 5 MG tablet tablet, Take 5 mg by mouth 2 (Two) Times a Day., Disp: , Rfl:    •  aspirin 81 MG tablet, Take 81 mg by mouth Daily., Disp: , Rfl:   •  atorvastatin (LIPITOR) 40 MG tablet, Take 1 tablet by mouth Daily., Disp: 90 tablet, Rfl: 3  •  calcium-vitamin D (OSCAL 500/200 D-3) 500-200 MG-UNIT per tablet, Take 1 tablet by mouth Daily., Disp: , Rfl:   •  carbidopa-levodopa (SINEMET)  MG per tablet, Take 1 tablet by mouth Every Night., Disp: 30 tablet, Rfl: 11  •  citalopram (CeleXA) 20 MG tablet, Take 20 mg by mouth Daily., Disp: , Rfl:   •  donepezil (ARICEPT) 10 MG tablet, Take 1 tablet by mouth every night., Disp: , Rfl:   •  escitalopram (LEXAPRO) 20 MG tablet, Take 20 mg by mouth Daily., Disp: , Rfl:   •  fluticasone (FLONASE) 50 MCG/ACT nasal spray, 2 sprays into each nostril Daily As Needed for Rhinitis or Allergies., Disp: , Rfl:   •  losartan (COZAAR) 100 MG tablet, Take 1 tablet by mouth Daily., Disp: 90 tablet, Rfl: 0  •  memantine (NAMENDA) 5 MG tablet, Take 10 mg by mouth Daily., Disp: , Rfl:   •  montelukast (SINGULAIR) 10 MG tablet, Take 1 tablet by mouth Daily., Disp: 90 tablet, Rfl: 3  •  Multiple Vitamin (MULTI-DAY VITAMINS) tablet, Take 1 tablet by mouth daily., Disp: , Rfl:   •  O2 (OXYGEN), Inhale 1 L/min Every Night., Disp: , Rfl:   •  pantoprazole (PROTONIX) 40 MG EC tablet, Take 1 tablet by mouth Daily., Disp: 90 tablet, Rfl: 1  •  sotalol (BETAPACE) 80 MG tablet, TAKE ONE-HALF TABLET BY MOUTH TWICE DAILY, Disp: 90 tablet, Rfl: 3  •  vitamin B-12 (CYANOCOBALAMIN) 1000 MCG tablet, Take 500 mcg by mouth Daily., Disp: , Rfl:       History of Present Illness     Pt denies any chest pain, dyspnea, dyspnea on exertion, orthopnea, PND, palpitations, lower extremity edema.  Memory impairment worsening.ROS:  All systems have been reviewed and are negative with the exception of those mentioned in the HPI.    OBJECTIVE:  Vitals:    02/18/19 1523   BP: 140/58   BP Location: Right arm   Patient Position: Sitting   Pulse: 56   SpO2: 97%   Weight: 92.4 kg (203 lb 9.6 oz)  "  Height: 172.7 cm (68\")     Physical Exam   Constitutional: She appears well-developed and well-nourished.   Neck: Normal range of motion. Neck supple. No hepatojugular reflux and no JVD present. Carotid bruit is not present. No tracheal deviation present. No thyromegaly present.   Cardiovascular: Normal rate, regular rhythm, S1 normal, S2 normal, intact distal pulses and normal pulses. PMI is not displaced. Exam reveals no gallop, no distant heart sounds, no friction rub, no midsystolic click and no opening snap.   No murmur heard.  Pulses:       Carotid pulses are on the right side with bruit, and on the left side with bruit.       Radial pulses are 2+ on the right side, and 2+ on the left side.        Dorsalis pedis pulses are 2+ on the right side, and 2+ on the left side.        Posterior tibial pulses are 2+ on the right side, and 2+ on the left side.   Pulmonary/Chest: Effort normal and breath sounds normal. She has no wheezes. She has no rales.   Abdominal: Soft. Bowel sounds are normal. She exhibits no mass. There is no tenderness. There is no guarding.       Diagnostic Data:    ECG 12 Lead  Date/Time: 2/18/2019 9:59 AM  Performed by: Mega Kendall MD  Authorized by: Mega Kendall MD   Rhythm: sinus bradycardia  Rate: bradycardic  BPM: 57  Conduction: right bundle branch block  QRS axis: normal    Clinical impression: abnormal EKG  Comments: Inferior infarct, undetermined age              ASSESSMENT:   Diagnosis Plan   1. Coronary artery disease involving native coronary artery of native heart without angina pectoris     2. CVD (cerebrovascular disease)     3. Essential hypertension     4. Paroxysmal atrial fibrillation (CMS/HCC)         PLAN:  Patient with progressive dementia followed per now [facility]  Known carotid disease will follow-up carotid duplex in 3months with follow-up appointment as is asymptomatic  Hypertension adequately controlled on current regimen  Atrial " fibrillation with low heart rate prolonged qTC on sotalol and anticoagulated but need for decrease sotalol dose to 40 bid short of changing altogether.     Vermeesch, Marilyn K, MD, thank you for referring Ms. Garcia for evaluation.  I have forwarded my electronically generated recommendations to you for review.  Please do not hesitate to call with any questions.      Mega Kendall MD, [facility]

## 2019-02-28 ENCOUNTER — TRANSCRIBE ORDERS (OUTPATIENT)
Dept: ADMINISTRATIVE | Facility: HOSPITAL | Age: 69
End: 2019-02-28

## 2019-02-28 DIAGNOSIS — Z12.39 SCREENING BREAST EXAMINATION: Primary | ICD-10-CM

## 2019-03-11 RX ORDER — AMLODIPINE BESYLATE 10 MG/1
TABLET ORAL
Qty: 90 TABLET | Refills: 1 | Status: SHIPPED | OUTPATIENT
Start: 2019-03-11 | End: 2020-01-14 | Stop reason: SDUPTHER

## 2019-04-15 ENCOUNTER — HOSPITAL ENCOUNTER (OUTPATIENT)
Dept: MAMMOGRAPHY | Facility: HOSPITAL | Age: 69
Discharge: HOME | End: 2019-04-15
Admitting: INTERNAL MEDICINE

## 2019-04-15 DIAGNOSIS — Z12.39 SCREENING BREAST EXAMINATION: ICD-10-CM

## 2019-04-15 PROCEDURE — 77067 SCR MAMMO BI INCL CAD: CPT

## 2019-04-15 PROCEDURE — 77063 BREAST TOMOSYNTHESIS BI: CPT

## 2019-04-17 ENCOUNTER — HOSPITAL ENCOUNTER (OUTPATIENT)
Dept: CARDIOLOGY | Facility: HOSPITAL | Age: 69
Discharge: HOME | End: 2019-04-17
Attending: INTERNAL MEDICINE | Admitting: INTERNAL MEDICINE

## 2019-04-17 VITALS
BODY MASS INDEX: 30.74 KG/M2 | SYSTOLIC BLOOD PRESSURE: 144 MMHG | WEIGHT: 202.82 LBS | HEIGHT: 68 IN | DIASTOLIC BLOOD PRESSURE: 73 MMHG

## 2019-04-17 DIAGNOSIS — I67.9 CVD (CEREBROVASCULAR DISEASE): ICD-10-CM

## 2019-04-17 DIAGNOSIS — R09.89 CAROTID BRUIT, UNSPECIFIED LATERALITY: ICD-10-CM

## 2019-04-17 LAB
BH CV ECHO MEAS - BSA(HAYCOCK): 2.1 M^2
BH CV ECHO MEAS - BSA: 2.1 M^2
BH CV ECHO MEAS - BZI_BMI: 30.9 KILOGRAMS/M^2
BH CV ECHO MEAS - BZI_METRIC_HEIGHT: 172.7 CM
BH CV ECHO MEAS - BZI_METRIC_WEIGHT: 92.1 KG
BH CV XLRA MEAS LEFT DIST CCA EDV: 12.8 CM/SEC
BH CV XLRA MEAS LEFT DIST CCA PSV: 88.4 CM/SEC
BH CV XLRA MEAS LEFT DIST ICA EDV: 22.7 CM/SEC
BH CV XLRA MEAS LEFT DIST ICA PSV: 151 CM/SEC
BH CV XLRA MEAS LEFT ICA/CCA RATIO: 11
BH CV XLRA MEAS LEFT MID CCA EDV: 12.3 CM/SEC
BH CV XLRA MEAS LEFT MID CCA PSV: 64.3 CM/SEC
BH CV XLRA MEAS LEFT MID ICA EDV: 27.5 CM/SEC
BH CV XLRA MEAS LEFT MID ICA PSV: 98.7 CM/SEC
BH CV XLRA MEAS LEFT PROX CCA EDV: 12.3 CM/SEC
BH CV XLRA MEAS LEFT PROX CCA PSV: 67.8 CM/SEC
BH CV XLRA MEAS LEFT PROX ECA EDV: 6.4 CM/SEC
BH CV XLRA MEAS LEFT PROX ECA PSV: 85 CM/SEC
BH CV XLRA MEAS LEFT PROX ICA EDV: 12.8 CM/SEC
BH CV XLRA MEAS LEFT PROX ICA PSV: 77.1 CM/SEC
BH CV XLRA MEAS LEFT PROX SCLA EDV: 0 CM/SEC
BH CV XLRA MEAS LEFT PROX SCLA PSV: 84.5 CM/SEC
BH CV XLRA MEAS LEFT VERTEBRAL A EDV: 7.3 CM/SEC
BH CV XLRA MEAS LEFT VERTEBRAL A PSV: 38.5 CM/SEC
BH CV XLRA MEAS RIGHT DIST CCA EDV: 11.9 CM/SEC
BH CV XLRA MEAS RIGHT DIST CCA PSV: 58.3 CM/SEC
BH CV XLRA MEAS RIGHT DIST ICA EDV: 29.3 CM/SEC
BH CV XLRA MEAS RIGHT DIST ICA PSV: 90.8 CM/SEC
BH CV XLRA MEAS RIGHT ICA/CCA RATIO: 3.7
BH CV XLRA MEAS RIGHT MID CCA EDV: 15.2 CM/SEC
BH CV XLRA MEAS RIGHT MID CCA PSV: 73.2 CM/SEC
BH CV XLRA MEAS RIGHT MID ICA EDV: 21.4 CM/SEC
BH CV XLRA MEAS RIGHT MID ICA PSV: 196 CM/SEC
BH CV XLRA MEAS RIGHT PROX CCA EDV: 10.3 CM/SEC
BH CV XLRA MEAS RIGHT PROX CCA PSV: 68.8 CM/SEC
BH CV XLRA MEAS RIGHT PROX ECA EDV: 11 CM/SEC
BH CV XLRA MEAS RIGHT PROX ECA PSV: 105 CM/SEC
BH CV XLRA MEAS RIGHT PROX ICA EDV: 38.2 CM/SEC
BH CV XLRA MEAS RIGHT PROX ICA PSV: 217 CM/SEC
BH CV XLRA MEAS RIGHT PROX SCLA EDV: 0 CM/SEC
BH CV XLRA MEAS RIGHT PROX SCLA PSV: 119 CM/SEC
BH CV XLRA MEAS RIGHT VERTEBRAL A EDV: 12.1 CM/SEC
BH CV XLRA MEAS RIGHT VERTEBRAL A PSV: 40.1 CM/SEC
LEFT ARM BP: NORMAL MMHG
RIGHT ARM BP: NORMAL MMHG

## 2019-04-17 PROCEDURE — 93880 EXTRACRANIAL BILAT STUDY: CPT | Performed by: INTERNAL MEDICINE

## 2019-04-17 PROCEDURE — 93880 EXTRACRANIAL BILAT STUDY: CPT

## 2019-06-22 RX ORDER — MONTELUKAST SODIUM 10 MG/1
TABLET ORAL
Qty: 90 TABLET | Refills: 3 | Status: SHIPPED | OUTPATIENT
Start: 2019-06-22 | End: 2020-01-14 | Stop reason: SDUPTHER

## 2019-07-17 ENCOUNTER — OFFICE VISIT (OUTPATIENT)
Dept: INTERNAL MEDICINE | Facility: CLINIC | Age: 69
End: 2019-07-17

## 2019-07-17 VITALS
HEART RATE: 66 BPM | BODY MASS INDEX: 30.01 KG/M2 | SYSTOLIC BLOOD PRESSURE: 122 MMHG | OXYGEN SATURATION: 97 % | WEIGHT: 198 LBS | TEMPERATURE: 97.8 F | HEIGHT: 68 IN | DIASTOLIC BLOOD PRESSURE: 70 MMHG

## 2019-07-17 DIAGNOSIS — Z00.00 ENCOUNTER FOR MEDICARE ANNUAL WELLNESS EXAM: Primary | ICD-10-CM

## 2019-07-17 DIAGNOSIS — F32.0 CURRENT MILD EPISODE OF MAJOR DEPRESSIVE DISORDER WITHOUT PRIOR EPISODE (HCC): ICD-10-CM

## 2019-07-17 DIAGNOSIS — F03.90 DEMENTIA WITHOUT BEHAVIORAL DISTURBANCE, UNSPECIFIED DEMENTIA TYPE: ICD-10-CM

## 2019-07-17 DIAGNOSIS — K21.9 GASTROESOPHAGEAL REFLUX DISEASE WITHOUT ESOPHAGITIS: ICD-10-CM

## 2019-07-17 DIAGNOSIS — F41.9 ANXIETY: ICD-10-CM

## 2019-07-17 DIAGNOSIS — I65.23 BILATERAL CAROTID ARTERY STENOSIS: ICD-10-CM

## 2019-07-17 DIAGNOSIS — J43.8 OTHER EMPHYSEMA (HCC): ICD-10-CM

## 2019-07-17 DIAGNOSIS — E78.2 MIXED HYPERLIPIDEMIA: ICD-10-CM

## 2019-07-17 DIAGNOSIS — I48.0 PAROXYSMAL ATRIAL FIBRILLATION (HCC): ICD-10-CM

## 2019-07-17 DIAGNOSIS — I10 ESSENTIAL HYPERTENSION: ICD-10-CM

## 2019-07-17 PROBLEM — Z13.820 SCREENING FOR OSTEOPOROSIS: Status: RESOLVED | Noted: 2018-07-16 | Resolved: 2019-07-17

## 2019-07-17 PROBLEM — J00 ACUTE NASOPHARYNGITIS: Status: RESOLVED | Noted: 2018-09-25 | Resolved: 2019-07-17

## 2019-07-17 PROBLEM — R09.89 BRUIT OF LEFT CAROTID ARTERY: Status: RESOLVED | Noted: 2018-04-06 | Resolved: 2019-07-17

## 2019-07-17 PROBLEM — D64.9 ANEMIA: Status: RESOLVED | Noted: 2017-08-30 | Resolved: 2019-07-17

## 2019-07-17 LAB
ALBUMIN SERPL-MCNC: 3.9 G/DL (ref 3.5–5)
ALBUMIN/GLOB SERPL: 1.6 G/DL (ref 1–2)
ALP SERPL-CCNC: 49 U/L (ref 38–126)
ALT SERPL-CCNC: 27 U/L (ref 13–69)
AST SERPL-CCNC: 25 U/L (ref 15–46)
BASOPHILS # BLD AUTO: 0.04 10*3/MM3 (ref 0–0.2)
BASOPHILS NFR BLD AUTO: 0.7 % (ref 0–1.5)
BILIRUB SERPL-MCNC: 0.4 MG/DL (ref 0.2–1.3)
BUN SERPL-MCNC: 12 MG/DL (ref 7–20)
BUN/CREAT SERPL: 17.1 (ref 7.1–23.5)
CALCIUM SERPL-MCNC: 9.2 MG/DL (ref 8.4–10.2)
CHLORIDE SERPL-SCNC: 104 MMOL/L (ref 98–107)
CHOLEST SERPL-MCNC: 173 MG/DL (ref 0–199)
CHOLEST/HDLC SERPL: 2.08 {RATIO}
CO2 SERPL-SCNC: 31 MMOL/L (ref 26–30)
CREAT SERPL-MCNC: 0.7 MG/DL (ref 0.6–1.3)
EOSINOPHIL # BLD AUTO: 0.23 10*3/MM3 (ref 0–0.4)
EOSINOPHIL NFR BLD AUTO: 3.9 % (ref 0.3–6.2)
ERYTHROCYTE [DISTWIDTH] IN BLOOD BY AUTOMATED COUNT: 12.3 % (ref 12.3–15.4)
GLOBULIN SER CALC-MCNC: 2.4 GM/DL
GLUCOSE SERPL-MCNC: 112 MG/DL (ref 74–98)
HCT VFR BLD AUTO: 40.5 % (ref 34–46.6)
HDLC SERPL-MCNC: 83 MG/DL (ref 40–60)
HGB BLD-MCNC: 12.9 G/DL (ref 12–15.9)
IMM GRANULOCYTES # BLD AUTO: 0.01 10*3/MM3 (ref 0–0.05)
IMM GRANULOCYTES NFR BLD AUTO: 0.2 % (ref 0–0.5)
LDLC SERPL CALC-MCNC: 72 MG/DL (ref 0–99)
LYMPHOCYTES # BLD AUTO: 2.09 10*3/MM3 (ref 0.7–3.1)
LYMPHOCYTES NFR BLD AUTO: 35.5 % (ref 19.6–45.3)
MCH RBC QN AUTO: 30.9 PG (ref 26.6–33)
MCHC RBC AUTO-ENTMCNC: 31.9 G/DL (ref 31.5–35.7)
MCV RBC AUTO: 96.9 FL (ref 79–97)
MONOCYTES # BLD AUTO: 0.4 10*3/MM3 (ref 0.1–0.9)
MONOCYTES NFR BLD AUTO: 6.8 % (ref 5–12)
NEUTROPHILS # BLD AUTO: 3.11 10*3/MM3 (ref 1.7–7)
NEUTROPHILS NFR BLD AUTO: 52.9 % (ref 42.7–76)
NRBC BLD AUTO-RTO: 0 /100 WBC (ref 0–0.2)
PLATELET # BLD AUTO: 167 10*3/MM3 (ref 140–450)
POTASSIUM SERPL-SCNC: 4.3 MMOL/L (ref 3.5–5.1)
PROT SERPL-MCNC: 6.3 G/DL (ref 6.3–8.2)
RBC # BLD AUTO: 4.18 10*6/MM3 (ref 3.77–5.28)
SODIUM SERPL-SCNC: 140 MMOL/L (ref 137–145)
TRIGL SERPL-MCNC: 90 MG/DL
TSH SERPL DL<=0.005 MIU/L-ACNC: 1.53 MIU/ML (ref 0.47–4.68)
VLDLC SERPL CALC-MCNC: 18 MG/DL
WBC # BLD AUTO: 5.88 10*3/MM3 (ref 3.4–10.8)

## 2019-07-17 PROCEDURE — G0439 PPPS, SUBSEQ VISIT: HCPCS | Performed by: INTERNAL MEDICINE

## 2019-07-17 NOTE — PROGRESS NOTES
The ABCs of the Annual Wellness Visit  Subsequent Medicare Wellness Visit    Chief Complaint   Patient presents with   • Medicare Wellness-subsequent       Subjective   History of Present Illness:  Nery Garcia is a 68 y.o. female who presents for a Subsequent Medicare Wellness Visit.  PMH of HTN, HLD, paroxysmal A. fib, CAD, carotid artery disease, PAT, allergies, COPD, GERD, dementia, depression.  Her BP is well controlled today, if she does not feel well, when she checks it, it is usually a little high.  She continues to drive, goes out to eat often.  She denies CP, SOA or palpitations.  No edema.  No CPAP at night, just oxygen recently. She has never had restless legs, this was only documented on sleep study, she would like to stop sinemet.  She has been seeing Dr Ramirez for her COPD, but she would like to see someone else or just get oxygen from here.  Her daughter does not feel she is sad, but she is getting more antisocial.  Does not want to leave the house as much.  Daughter feels that they will have to start helping with medications.  She is missing her medications, forgetting or taking AM for PM.  She feels she is sleeping well.      HEALTH RISK ASSESSMENT    Recent Hospitalizations:  No hospitalization(s) within the last year.    Current Medical Providers:  Patient Care Team:  Vermeesch, Marilyn K, MD as PCP - General  Vermeesch, Marilyn K, MD as PCP - Family Medicine  Vermeesch, Marilyn K, MD as PCP - Claims Attributed    Smoking Status:  Social History     Tobacco Use   Smoking Status Former Smoker   • Packs/day: 3.00   • Years: 35.00   • Pack years: 105.00   • Types: Cigarettes   • Last attempt to quit:    • Years since quittin.5   Smokeless Tobacco Never Used       Alcohol Consumption:  Social History     Substance and Sexual Activity   Alcohol Use Yes    Comment: REPORTS HAS SEVERAL MIXED DRINKS WEEKLY       Depression Screen:   PHQ-2/PHQ-9 Depression Screening 2018   Sho  interest or pleasure in doing things 0   Feeling down, depressed, or hopeless 1   Total Score 1       Fall Risk Screen:  CORINA Fall Risk Assessment was completed, and patient is at MODERATE risk for falls. Assessment completed on:7/17/2019    Health Habits and Functional and Cognitive Screening:  Functional & Cognitive Status 7/17/2019   Do you have difficulty preparing food and eating? No   Do you have difficulty bathing yourself, getting dressed or grooming yourself? No   Do you have difficulty using the toilet? No   Do you have difficulty moving around from place to place? No   Do you have trouble with steps or getting out of a bed or a chair? No   Current Diet Well Balanced Diet   Dental Exam Not up to date   Eye Exam Up to date   Exercise (times per week) 1 times per week   Current Exercise Activities Include -   Do you need help using the phone?  No   Are you deaf or do you have serious difficulty hearing?  No   Do you need help with transportation? No   Do you need help shopping? No   Do you need help preparing meals?  No   Do you need help with housework?  No   Do you need help with laundry? No   Do you need help taking your medications? Yes   Do you need help managing money? No   Do you ever drive or ride in a car without wearing a seat belt? Yes   Have you felt unusual stress, anger or loneliness in the last month? Yes   Who do you live with? Alone   If you need help, do you have trouble finding someone available to you? No   Do you have difficulty concentrating, remembering or making decisions? Yes         Does the patient have evidence of cognitive impairment? Yes    Asprin use counseling:Taking ASA appropriately as indicated    Age-appropriate Screening Schedule:  Refer to the list below for future screening recommendations based on patient's age, sex and/or medical conditions. Orders for these recommended tests are listed in the plan section. The patient has been provided with a written  plan.    Health Maintenance   Topic Date Due   • ZOSTER VACCINE (1 of 2) 11/14/2001   • LIPID PANEL  07/16/2019   • INFLUENZA VACCINE  08/01/2019   • MAMMOGRAM  04/15/2021   • COLONOSCOPY  09/27/2027   • PNEUMOCOCCAL VACCINES (65+ LOW/MEDIUM RISK)  Discontinued   • TDAP/TD VACCINES  Discontinued          The following portions of the patient's history were reviewed and updated as appropriate: allergies, current medications, past family history, past medical history, past social history, past surgical history and problem list.    Outpatient Medications Prior to Visit   Medication Sig Dispense Refill   • amLODIPine (NORVASC) 10 MG tablet TAKE 1 TABLET BY MOUTH ONCE DAILY 90 tablet 1   • apixaban (ELIQUIS) 5 MG tablet tablet Take 5 mg by mouth 2 (Two) Times a Day.     • aspirin 81 MG tablet Take 81 mg by mouth Daily.     • atorvastatin (LIPITOR) 40 MG tablet Take 1 tablet by mouth Daily. 90 tablet 3   • calcium-vitamin D (OSCAL 500/200 D-3) 500-200 MG-UNIT per tablet Take 1 tablet by mouth Daily.     • citalopram (CeleXA) 20 MG tablet Take 20 mg by mouth Daily.     • donepezil (ARICEPT) 10 MG tablet Take 1 tablet by mouth every night.     • escitalopram (LEXAPRO) 20 MG tablet Take 20 mg by mouth Daily.     • fluticasone (FLONASE) 50 MCG/ACT nasal spray 2 sprays into each nostril Daily As Needed for Rhinitis or Allergies.     • losartan (COZAAR) 100 MG tablet Take 1 tablet by mouth Daily. 90 tablet 0   • memantine (NAMENDA) 5 MG tablet Take 10 mg by mouth Daily.     • montelukast (SINGULAIR) 10 MG tablet TAKE 1 TABLET BY MOUTH ONCE DAILY 90 tablet 3   • Multiple Vitamin (MULTI-DAY VITAMINS) tablet Take 1 tablet by mouth daily.     • O2 (OXYGEN) Inhale 1 L/min Every Night.     • pantoprazole (PROTONIX) 40 MG EC tablet Take 1 tablet by mouth Daily. 90 tablet 1   • sotalol (BETAPACE) 80 MG tablet TAKE ONE-HALF TABLET BY MOUTH TWICE DAILY 90 tablet 3   • vitamin B-12 (CYANOCOBALAMIN) 1000 MCG tablet Take 500 mcg by mouth  "Daily.     • carbidopa-levodopa (SINEMET)  MG per tablet Take 1 tablet by mouth Every Night. 30 tablet 11     No facility-administered medications prior to visit.        Patient Active Problem List   Diagnosis   • Anxiety   • Dementia   • Depression   • Hyperlipidemia   • Hypertension   • Obstructive sleep apnea syndrome   • Paroxysmal atrial fibrillation (CMS/HCC)   • Allergic rhinitis   • Chronic constipation   • CAD (coronary artery disease)   • COPD (chronic obstructive pulmonary disease) (CMS/[facility])   • GERD (gastroesophageal reflux disease)   • Carotid artery disease (CMS/[facility])   • Chronic fatigue   • Encounter for Medicare annual wellness exam       Advanced Care Planning:  Patient has an advance directive - a copy has not been provided. Have asked the patient to send this to us to add to record    Review of Systems   HENT: Positive for postnasal drip and rhinorrhea.    Eyes: Negative.    Respiratory: Negative.    Cardiovascular: Positive for leg swelling.   Gastrointestinal: Positive for nausea.   Endocrine: Negative.    Genitourinary: Negative.    Musculoskeletal: Negative.    Skin: Negative.    Allergic/Immunologic: Positive for environmental allergies.   Neurological: Negative.    Hematological: Bruises/bleeds easily.   Psychiatric/Behavioral: Positive for confusion and dysphoric mood.       Compared to one year ago, the patient feels her physical health is the same.  Compared to one year ago, the patient feels her mental health is the same.    Reviewed chart for potential of high risk medication in the elderly: yes  Reviewed chart for potential of harmful drug interactions in the elderly:yes    Objective         Vitals:    07/17/19 0811   BP: 122/70   Pulse: 66   Temp: 97.8 °F (36.6 °C)   SpO2: 97%   Weight: 89.8 kg (198 lb)   Height: 173 cm (68.11\")   PainSc: 0-No pain       Body mass index is 30.01 kg/m².  Discussed the patient's BMI with her. The BMI is above average; BMI management plan is " completed.    Physical Exam   Constitutional: She appears well-developed and well-nourished. No distress.   Pleasant female, appears older than stated age, NAD today, she is hungry!!!   HENT:   Head: Normocephalic and atraumatic.   Right Ear: External ear normal.   Left Ear: External ear normal.   Mouth/Throat: Oropharynx is clear and moist.   Right TM perforation, left TM normal, dentures noted   Eyes: Conjunctivae and EOM are normal. Pupils are equal, round, and reactive to light.   Lens implants noted   Neck: Normal range of motion. Neck supple. No thyromegaly present.   Cardiovascular: Normal rate, regular rhythm, normal heart sounds and intact distal pulses.   No murmur heard.  Faint left carotid bruit  NSR today   Pulmonary/Chest: Effort normal and breath sounds normal. No respiratory distress. She has no wheezes.   Abdominal: Soft. Bowel sounds are normal. She exhibits no distension. There is no tenderness.   Musculoskeletal: Normal range of motion. She exhibits no edema.   Lymphadenopathy:     She has no cervical adenopathy.   Neurological: She is alert. She displays normal reflexes. No cranial nerve deficit. Coordination normal.   Psychiatric: Her behavior is normal. Thought content normal.   Flat affect today  Her daughter helped answer many questions for her today   Nursing note and vitals reviewed.            Assessment/Plan   Medicare Risks and Personalized Health Plan  CMS Preventative Services Quick Reference  Cardiovascular risk  Dementia/Memory   Diabetic Lab Screening   Immunizations Discussed/Encouraged (specific immunizations; Hepatitis A Vaccine/Series and Shingrix )  Inactivity/Sedentary  Obesity/Overweight     The above risks/problems have been discussed with the patient.  Pertinent information has been shared with the patient in the After Visit Summary.  Follow up plans and orders are seen below in the Assessment/Plan Section.    Diagnoses and all orders for this visit:    1. Encounter for  Medicare annual wellness exam (Primary)  -     CBC & Differential  -     Comprehensive Metabolic Panel  -     Lipid Panel With / Chol / HDL Ratio  -     TSH    2. Essential hypertension  -     CBC & Differential  -     Comprehensive Metabolic Panel  -     Lipid Panel With / Chol / HDL Ratio    3. Mixed hyperlipidemia  -     Comprehensive Metabolic Panel  -     Lipid Panel With / Chol / HDL Ratio    4. Paroxysmal atrial fibrillation (CMS/HCC)  -     TSH    5. Bilateral carotid artery stenosis    6. Other emphysema (CMS/[facility])    7. Gastroesophageal reflux disease without esophagitis    8. Dementia without behavioral disturbance, unspecified dementia type  -     TSH    9. Current mild episode of major depressive disorder without prior episode (CMS/HCC)  -     TSH    10. Anxiety  -     TSH       Labs as noted  Recommend Hep A and shingles vaccine at pharmacy  Copy of living will for chart  Stop sinemet per family request, if sleep worsens, may need to resume med  Continue PM oxygen  US of carotids is UTD  Follow heart healthy/low salt diet  Avoid processed foods  Monitor blood pressure as discussed  Exercise as tolerated up to 30 minutes 5 days per week  Take all medications as prescribed  Continue oxygen at night  Her GERD is well controlled on protonix  Continue aricept for dementia, family is very involved in her care  Continue lexapro for depression and anxiety    Follow Up:  Return in about 6 months (around 1/17/2020) for Next scheduled follow up.     An After Visit Summary and PPPS were given to the patient.

## 2019-10-01 RX ORDER — PANTOPRAZOLE SODIUM 40 MG/1
TABLET, DELAYED RELEASE ORAL
Qty: 90 TABLET | Refills: 3 | Status: SHIPPED | OUTPATIENT
Start: 2019-10-01 | End: 2020-01-14 | Stop reason: SDUPTHER

## 2019-10-01 RX ORDER — LOSARTAN POTASSIUM 100 MG/1
TABLET ORAL
Qty: 90 TABLET | Refills: 1 | Status: SHIPPED | OUTPATIENT
Start: 2019-10-01 | End: 2019-10-16 | Stop reason: SDUPTHER

## 2019-10-16 RX ORDER — LOSARTAN POTASSIUM 100 MG/1
100 TABLET ORAL DAILY
Qty: 90 TABLET | Refills: 1 | Status: SHIPPED | OUTPATIENT
Start: 2019-10-16 | End: 2020-01-14 | Stop reason: SDUPTHER

## 2019-11-20 ENCOUNTER — OFFICE VISIT (OUTPATIENT)
Dept: CARDIOLOGY | Facility: CLINIC | Age: 69
End: 2019-11-20

## 2019-11-20 VITALS
SYSTOLIC BLOOD PRESSURE: 160 MMHG | HEIGHT: 68 IN | DIASTOLIC BLOOD PRESSURE: 80 MMHG | WEIGHT: 197 LBS | HEART RATE: 67 BPM | BODY MASS INDEX: 29.86 KG/M2

## 2019-11-20 DIAGNOSIS — I65.23 BILATERAL CAROTID ARTERY STENOSIS: ICD-10-CM

## 2019-11-20 DIAGNOSIS — I48.0 PAROXYSMAL ATRIAL FIBRILLATION (HCC): ICD-10-CM

## 2019-11-20 DIAGNOSIS — I10 ESSENTIAL HYPERTENSION: ICD-10-CM

## 2019-11-20 DIAGNOSIS — I25.10 CORONARY ARTERY DISEASE INVOLVING NATIVE CORONARY ARTERY OF NATIVE HEART WITHOUT ANGINA PECTORIS: Primary | ICD-10-CM

## 2019-11-20 DIAGNOSIS — E78.2 MIXED HYPERLIPIDEMIA: ICD-10-CM

## 2019-11-20 PROCEDURE — 99214 OFFICE O/P EST MOD 30 MIN: CPT | Performed by: INTERNAL MEDICINE

## 2019-11-20 PROCEDURE — 93000 ELECTROCARDIOGRAM COMPLETE: CPT | Performed by: INTERNAL MEDICINE

## 2019-11-20 RX ORDER — FUROSEMIDE 20 MG/1
20 TABLET ORAL DAILY
COMMUNITY
End: 2020-06-04

## 2020-01-13 RX ORDER — SOTALOL HYDROCHLORIDE 80 MG/1
TABLET ORAL
Qty: 90 TABLET | Refills: 1 | Status: SHIPPED | OUTPATIENT
Start: 2020-01-13 | End: 2020-11-02

## 2020-01-14 RX ORDER — PANTOPRAZOLE SODIUM 40 MG/1
40 TABLET, DELAYED RELEASE ORAL DAILY
Qty: 90 TABLET | Refills: 3 | Status: SHIPPED | OUTPATIENT
Start: 2020-01-14 | End: 2021-02-08

## 2020-01-14 RX ORDER — ATORVASTATIN CALCIUM 40 MG/1
40 TABLET, FILM COATED ORAL DAILY
Qty: 90 TABLET | Refills: 3 | Status: SHIPPED | OUTPATIENT
Start: 2020-01-14 | End: 2021-03-29

## 2020-01-14 RX ORDER — LOSARTAN POTASSIUM 100 MG/1
100 TABLET ORAL DAILY
Qty: 90 TABLET | Refills: 1 | Status: SHIPPED | OUTPATIENT
Start: 2020-01-14 | End: 2020-11-02

## 2020-01-14 RX ORDER — MONTELUKAST SODIUM 10 MG/1
10 TABLET ORAL DAILY
Qty: 90 TABLET | Refills: 3 | Status: SHIPPED | OUTPATIENT
Start: 2020-01-14 | End: 2021-02-08

## 2020-01-14 RX ORDER — AMLODIPINE BESYLATE 10 MG/1
10 TABLET ORAL DAILY
Qty: 90 TABLET | Refills: 1 | Status: SHIPPED | OUTPATIENT
Start: 2020-01-14 | End: 2020-12-21

## 2020-01-29 ENCOUNTER — OFFICE VISIT (OUTPATIENT)
Dept: INTERNAL MEDICINE | Facility: CLINIC | Age: 70
End: 2020-01-29

## 2020-01-29 VITALS
TEMPERATURE: 98.1 F | SYSTOLIC BLOOD PRESSURE: 152 MMHG | HEIGHT: 68 IN | HEART RATE: 59 BPM | BODY MASS INDEX: 30.46 KG/M2 | OXYGEN SATURATION: 98 % | DIASTOLIC BLOOD PRESSURE: 74 MMHG | WEIGHT: 201 LBS

## 2020-01-29 DIAGNOSIS — R60.0 LOCAL EDEMA: Primary | ICD-10-CM

## 2020-01-29 PROCEDURE — 99213 OFFICE O/P EST LOW 20 MIN: CPT | Performed by: INTERNAL MEDICINE

## 2020-01-29 NOTE — PROGRESS NOTES
Chief Complaint   Patient presents with   • Abstract     Pt states she's had swelling in her feet and legs.     Subjective   Novel Azael Garcia is a 69 y.o. female.     Pt here today with complaints of swelling in her feet and legs.   She does eat out a lot per her daughter.  She does not cook.  Eats peanut butter and crackers, sandwiches, nutella.   She has a lot of HAs, but does not drink much water.    Her BP is elevated today, she does not check it at home.  Her daughter will help her check it.    She denies CP, SOA, palpitations.  She is compliant with medications.  Patient continues to drive and she lives alone.  She admits that she has been lost and had to call her daughter while driving for directions.  She does not cook therefore there has been no concerns about issues regarding burning food on the stove.  Her daughter and other family members have been concerned about her living alone, however, thus far there have been no falls or issues at home.  They have offered to get a caregiver a few days a week but patient resists and states that she would only like her family members to help with care.  Patient does come to daughter's home on occasion to eat dinner but as noted above goes out often to eat as she does not cook.       The following portions of the patient's history were reviewed and updated as appropriate: allergies, current medications, past family history, past medical history, past social history, past surgical history and problem list.    Review of Systems   Constitutional: Negative for activity change, appetite change and unexpected weight change.   Eyes: Negative for visual disturbance.   Respiratory: Negative for shortness of breath.    Cardiovascular: Positive for leg swelling. Negative for chest pain and palpitations.   Gastrointestinal: Negative for abdominal pain.   Neurological: Positive for headaches.   Psychiatric/Behavioral: Positive for confusion. Negative for dysphoric mood and  "sleep disturbance. The patient is not nervous/anxious.        Objective   /74   Pulse 59   Temp 98.1 °F (36.7 °C)   Ht 172.7 cm (68\")   Wt 91.2 kg (201 lb)   SpO2 98%   BMI 30.56 kg/m²   Body mass index is 30.56 kg/m².  Physical Exam   Constitutional: She appears well-developed and well-nourished. No distress.   Pleasant female who appears her stated age, she is in no distress   HENT:   Head: Normocephalic and atraumatic.   Right Ear: External ear normal.   Left Ear: External ear normal.   Eyes: Pupils are equal, round, and reactive to light. Conjunctivae and EOM are normal.   Neck: Normal range of motion. Neck supple. No thyromegaly present.   Cardiovascular: Normal rate, regular rhythm, normal heart sounds and intact distal pulses.   No murmur heard.  No carotid bruits   Pulmonary/Chest: Effort normal and breath sounds normal. No respiratory distress. She has no wheezes.   Abdominal: Soft. Bowel sounds are normal. She exhibits no distension. There is no tenderness.   Musculoskeletal: Normal range of motion. She exhibits edema.   Trace edema only at ankles   Lymphadenopathy:     She has no cervical adenopathy.   Neurological: She is alert. No cranial nerve deficit. Coordination normal.   Psychiatric: She has a normal mood and affect. Her behavior is normal. Thought content normal.   Pleasantly confused female, she answers most questions appropriately and occasionally looks to her daughter for help with questions   Nursing note and vitals reviewed.      Assessment/Plan   Nery Garcia is here today and the following problems have been addressed:      Nery was seen today for abstract.    Diagnoses and all orders for this visit:    Local edema    recommend elevate legs  Wear knee high compression socks all day  Follow heart healthy/low salt diet  Avoid processed foods  Encouraged patient to eat more often with her family, daughter is in agreement to this plan  Monitor blood pressure as discussed and " bring reads to next visit  Take all medications as prescribed    RTC as scheduled next mo for routine follow up        Please note that portions of this note were completed with a voice recognition program.  Efforts were made to edit dictation, but occasionally words are mistranscribed.

## 2020-02-12 ENCOUNTER — OFFICE VISIT (OUTPATIENT)
Dept: INTERNAL MEDICINE | Facility: CLINIC | Age: 70
End: 2020-02-12

## 2020-02-12 VITALS
TEMPERATURE: 97.5 F | HEART RATE: 65 BPM | OXYGEN SATURATION: 98 % | HEIGHT: 68 IN | BODY MASS INDEX: 30.31 KG/M2 | WEIGHT: 200 LBS | SYSTOLIC BLOOD PRESSURE: 144 MMHG | DIASTOLIC BLOOD PRESSURE: 78 MMHG

## 2020-02-12 DIAGNOSIS — R73.9 HYPERGLYCEMIA: ICD-10-CM

## 2020-02-12 DIAGNOSIS — K21.9 GASTROESOPHAGEAL REFLUX DISEASE WITHOUT ESOPHAGITIS: ICD-10-CM

## 2020-02-12 DIAGNOSIS — I48.0 PAROXYSMAL ATRIAL FIBRILLATION (HCC): ICD-10-CM

## 2020-02-12 DIAGNOSIS — F32.0 CURRENT MILD EPISODE OF MAJOR DEPRESSIVE DISORDER WITHOUT PRIOR EPISODE (HCC): ICD-10-CM

## 2020-02-12 DIAGNOSIS — I10 ESSENTIAL HYPERTENSION: Primary | ICD-10-CM

## 2020-02-12 DIAGNOSIS — E78.2 MIXED HYPERLIPIDEMIA: ICD-10-CM

## 2020-02-12 DIAGNOSIS — F03.90 DEMENTIA WITHOUT BEHAVIORAL DISTURBANCE, UNSPECIFIED DEMENTIA TYPE: ICD-10-CM

## 2020-02-12 DIAGNOSIS — Z23 NEED FOR INFLUENZA VACCINATION: ICD-10-CM

## 2020-02-12 DIAGNOSIS — J43.8 OTHER EMPHYSEMA (HCC): ICD-10-CM

## 2020-02-12 LAB
ALBUMIN SERPL-MCNC: 4.3 G/DL (ref 3.5–5.2)
ALBUMIN/GLOB SERPL: 2 G/DL
ALP SERPL-CCNC: 58 U/L (ref 39–117)
ALT SERPL-CCNC: 19 U/L (ref 1–33)
AST SERPL-CCNC: 21 U/L (ref 1–32)
BASOPHILS # BLD AUTO: 0.06 10*3/MM3 (ref 0–0.2)
BASOPHILS NFR BLD AUTO: 1 % (ref 0–1.5)
BILIRUB SERPL-MCNC: 0.7 MG/DL (ref 0.2–1.2)
BUN SERPL-MCNC: 10 MG/DL (ref 8–23)
BUN/CREAT SERPL: 11.6 (ref 7–25)
CALCIUM SERPL-MCNC: 9.5 MG/DL (ref 8.6–10.5)
CHLORIDE SERPL-SCNC: 99 MMOL/L (ref 98–107)
CO2 SERPL-SCNC: 29.2 MMOL/L (ref 22–29)
CREAT SERPL-MCNC: 0.86 MG/DL (ref 0.57–1)
EOSINOPHIL # BLD AUTO: 0.12 10*3/MM3 (ref 0–0.4)
EOSINOPHIL NFR BLD AUTO: 2 % (ref 0.3–6.2)
ERYTHROCYTE [DISTWIDTH] IN BLOOD BY AUTOMATED COUNT: 11.9 % (ref 12.3–15.4)
GLOBULIN SER CALC-MCNC: 2.2 GM/DL
GLUCOSE SERPL-MCNC: 122 MG/DL (ref 65–99)
HBA1C MFR BLD: 6 % (ref 4.8–5.6)
HCT VFR BLD AUTO: 37 % (ref 34–46.6)
HGB BLD-MCNC: 12.9 G/DL (ref 12–15.9)
IMM GRANULOCYTES # BLD AUTO: 0.01 10*3/MM3 (ref 0–0.05)
IMM GRANULOCYTES NFR BLD AUTO: 0.2 % (ref 0–0.5)
LYMPHOCYTES # BLD AUTO: 2.05 10*3/MM3 (ref 0.7–3.1)
LYMPHOCYTES NFR BLD AUTO: 34.1 % (ref 19.6–45.3)
MCH RBC QN AUTO: 32.4 PG (ref 26.6–33)
MCHC RBC AUTO-ENTMCNC: 34.9 G/DL (ref 31.5–35.7)
MCV RBC AUTO: 93 FL (ref 79–97)
MONOCYTES # BLD AUTO: 0.34 10*3/MM3 (ref 0.1–0.9)
MONOCYTES NFR BLD AUTO: 5.6 % (ref 5–12)
NEUTROPHILS # BLD AUTO: 3.44 10*3/MM3 (ref 1.7–7)
NEUTROPHILS NFR BLD AUTO: 57.1 % (ref 42.7–76)
NRBC BLD AUTO-RTO: 0 /100 WBC (ref 0–0.2)
PLATELET # BLD AUTO: 173 10*3/MM3 (ref 140–450)
POTASSIUM SERPL-SCNC: 4.3 MMOL/L (ref 3.5–5.2)
PROT SERPL-MCNC: 6.5 G/DL (ref 6–8.5)
RBC # BLD AUTO: 3.98 10*6/MM3 (ref 3.77–5.28)
SODIUM SERPL-SCNC: 140 MMOL/L (ref 136–145)
WBC # BLD AUTO: 6.02 10*3/MM3 (ref 3.4–10.8)

## 2020-02-12 PROCEDURE — 90653 IIV ADJUVANT VACCINE IM: CPT | Performed by: INTERNAL MEDICINE

## 2020-02-12 PROCEDURE — G0008 ADMIN INFLUENZA VIRUS VAC: HCPCS | Performed by: INTERNAL MEDICINE

## 2020-02-12 PROCEDURE — 99214 OFFICE O/P EST MOD 30 MIN: CPT | Performed by: INTERNAL MEDICINE

## 2020-02-12 NOTE — PROGRESS NOTES
Chief Complaint   Patient presents with   • Follow-up     for anxiety, depression, GERD, HLD, and HTN. Pt states she constantly keeps a headache.     Subjective   Novel Azael Garcia is a 69 y.o. female.     Here today for follow up of HTN, HLD, Afib, CAD, carotid disease, COPD, PAT, GERD, dementia, anx/depression, RLS.   HTN/HLD/Afib/CAD- her BP is elevated today.  She denies any CP, SOA, palpitations.  No edema.  She has had HAs daily for a few months.  Her home BP is running 135-140/60-70s.  No URI sxs.  No new medications since complaining of HA.   COPD/PAT- she is not using any inhalers.  She is using oxygen at night  GERD- she remains on protonix daily  Dementia- she remains on aricept and namenda  Anx/depr- she has been sad recently, had depression over the holidays.  She sleeps well overall.   RLS- she denies any RLS sxs. She does have an occasional tremor.    HCM- Mammogram is UTD.  DEXA is UTD. Pneumoccal vaccines are UTD.  She has not had her flu shot, Hep A or shingles vaccines.   Her daughter has noted that she is drinking alcohol almost daily.  She does not eat regularly.  She goes out to eat almost every day.  She does not cook for herself but eats a lot of processed and microwave meals.  Patient continues to drive.  She occasionally gets lost while driving and calls her family to find her way home or to wherever she is going.  Daughter followed her one day and states that she obeys the rules and drives well, it is just that her memory causes her to forget where she is going.  Daughter discussed this with her neurologist and he states it is still okay for her to drive.       The following portions of the patient's history were reviewed and updated as appropriate: allergies, current medications, past family history, past medical history, past social history, past surgical history and problem list.    Review of Systems   Constitutional: Negative for activity change, appetite change and unexpected weight  "change.   Eyes: Negative for visual disturbance.   Respiratory: Negative for shortness of breath.    Cardiovascular: Negative for chest pain, palpitations and leg swelling.   Gastrointestinal: Negative for abdominal pain.   Neurological: Positive for headaches.   Psychiatric/Behavioral: Positive for confusion and dysphoric mood. Negative for sleep disturbance. The patient is not nervous/anxious.        Objective   /78   Pulse 65   Temp 97.5 °F (36.4 °C)   Ht 172.7 cm (68\")   Wt 90.7 kg (200 lb)   SpO2 98%   BMI 30.41 kg/m²   Body mass index is 30.41 kg/m².  Physical Exam   Constitutional: She appears well-developed and well-nourished. No distress.   Pleasant female who appears her stated age, no distress today   HENT:   Head: Normocephalic and atraumatic.   Right Ear: External ear normal.   Left Ear: External ear normal.   Mouth/Throat: Oropharynx is clear and moist.   Eyes: Pupils are equal, round, and reactive to light. Conjunctivae and EOM are normal.   Neck: Normal range of motion. Neck supple. No thyromegaly present.   Cardiovascular: Normal rate, regular rhythm, normal heart sounds and intact distal pulses.   No murmur heard.  Faint left carotid bruit noted   Pulmonary/Chest: Effort normal and breath sounds normal. No respiratory distress. She has no wheezes.   Abdominal: Soft. Bowel sounds are normal. She exhibits no distension. There is no tenderness.   Musculoskeletal: Normal range of motion. She exhibits no edema.   Lymphadenopathy:     She has no cervical adenopathy.   Neurological: She is alert. No cranial nerve deficit. Coordination normal.   Psychiatric: Her behavior is normal.   She answers most questions appropriately but is forgetful and her daughter helps her answers several questions   Nursing note and vitals reviewed.      Assessment/Plan   Nery Garcia is here today and the following problems have been addressed:      Nery was seen today for follow-up.    Diagnoses and all " orders for this visit:    Essential hypertension  -     CBC & Differential  -     Comprehensive Metabolic Panel    Mixed hyperlipidemia  -     Comprehensive Metabolic Panel    Paroxysmal atrial fibrillation (CMS/HCC)    Other emphysema (CMS/HCC)    Gastroesophageal reflux disease without esophagitis    Dementia without behavioral disturbance, unspecified dementia type (CMS/HCC)    Current mild episode of major depressive disorder without prior episode (CMS/HCC)    Hyperglycemia  -     Comprehensive Metabolic Panel  -     Hemoglobin A1c        Follow heart healthy/low salt diet  Avoid processed foods  Monitor blood pressure as discussed-daughter is to check blood pressure regularly and send me readings in a few weeks  If blood pressure remains elevated we will add hydrochlorothiazide 12.5 mg daily  Exercise as tolerated up to 150 minutes per week  Take all medications as prescribed  Labs as noted  She was given flu shot today  Work on alcohol cessation  Family is working on help at home for cleaning and food due her worsening dementia  Continue Lexapro for underlying depression  Encouraged increase hydration and better diet due to headaches    Return in about 3 months (around 5/12/2020) for Next scheduled follow up.      Marilyn K. Vermeesch, MD      Please note that portions of this note were completed with a voice recognition program.  Efforts were made to edit dictation, but occasionally words are mistranscribed.

## 2020-02-21 ENCOUNTER — HOSPITAL ENCOUNTER (EMERGENCY)
Facility: HOSPITAL | Age: 70
Discharge: HOME | End: 2020-02-21
Attending: STUDENT IN AN ORGANIZED HEALTH CARE EDUCATION/TRAINING PROGRAM | Admitting: STUDENT IN AN ORGANIZED HEALTH CARE EDUCATION/TRAINING PROGRAM

## 2020-02-21 VITALS
DIASTOLIC BLOOD PRESSURE: 63 MMHG | SYSTOLIC BLOOD PRESSURE: 129 MMHG | WEIGHT: 200.54 LBS | RESPIRATION RATE: 16 BRPM | HEART RATE: 62 BPM | OXYGEN SATURATION: 99 % | BODY MASS INDEX: 30.39 KG/M2 | HEIGHT: 68 IN | TEMPERATURE: 98.6 F

## 2020-02-21 DIAGNOSIS — R04.0 EPISTAXIS: Primary | ICD-10-CM

## 2020-02-21 PROCEDURE — 63710000001 ONDANSETRON ODT 4 MG TABLET DISPERSIBLE: Performed by: STUDENT IN AN ORGANIZED HEALTH CARE EDUCATION/TRAINING PROGRAM

## 2020-02-21 PROCEDURE — 99283 EMERGENCY DEPT VISIT LOW MDM: CPT

## 2020-02-21 RX ORDER — BUTALBITAL, ACETAMINOPHEN AND CAFFEINE 50; 325; 40 MG/1; MG/1; MG/1
1 TABLET ORAL ONCE
Status: COMPLETED | OUTPATIENT
Start: 2020-02-21 | End: 2020-02-21

## 2020-02-21 RX ORDER — OXYMETAZOLINE HYDROCHLORIDE 0.05 G/100ML
1 SPRAY NASAL ONCE
Status: COMPLETED | OUTPATIENT
Start: 2020-02-21 | End: 2020-02-21

## 2020-02-21 RX ORDER — ONDANSETRON 4 MG/1
4 TABLET, ORALLY DISINTEGRATING ORAL ONCE
Status: COMPLETED | OUTPATIENT
Start: 2020-02-21 | End: 2020-02-21

## 2020-02-21 RX ADMIN — ONDANSETRON 4 MG: 4 TABLET, ORALLY DISINTEGRATING ORAL at 11:14

## 2020-02-21 RX ADMIN — Medication 1 SPRAY: at 11:16

## 2020-02-21 RX ADMIN — BUTALBITAL, ACETAMINOPHEN AND CAFFEINE 1 TABLET: 50; 325; 40 TABLET ORAL at 11:13

## 2020-02-21 NOTE — ED PROVIDER NOTES
"Subjective   Patient is here with her daughter reports an hour or so ago she had start of a nosebleed left-sided, has improved, no falls or injuries no chest pain shortness of air reported patient does endorse some slight headache but states he has been having headaches intermittently for \"sometime\" so this is not unusual.  No other systemic complaints no visual changes or disturbances reported patient does take aspirin and Eliquis does have some history of atrial fib and anxiety      History provided by:  Patient and relative      Review of Systems   Constitutional: Negative.    HENT: Positive for nosebleeds.    Eyes: Negative.    Respiratory: Negative.    Cardiovascular: Negative.  Negative for chest pain.   Gastrointestinal: Negative.  Negative for nausea and vomiting.   Genitourinary: Negative.    Musculoskeletal: Negative.  Negative for neck pain and neck stiffness.   Skin: Negative.    Neurological: Positive for headaches. Negative for syncope, speech difficulty and weakness.   Psychiatric/Behavioral: The patient is nervous/anxious.    All other systems reviewed and are negative.      Past Medical History:   Diagnosis Date   • Acute bronchitis    • Acute otitis media    • Anxiety    • Atrial fibrillation (CMS/HCC)    • Body piercing     EARS   • CAD (coronary artery disease)    • CAD (coronary artery disease)    • Cardiac insufficiency (CMS/HCC)    • Chest pain    • Colitis    • COPD (chronic obstructive pulmonary disease) (CMS/HCC)    • Dementia (CMS/HCC)     PATIENT REPORTS VERY EARLY STAGES OF THIS.  REPORTS CURRENTLY LIVES BY HERSELF.   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 BUT DOES NOT WEAR THEM ANYMORE   • Hepatitis     REPORTS AS A CHILD, UNSURE TYPE   • History of esophageal reflux    • History of obesity    • Hyperlipidemia    • " Hypertension    • Iron deficiency anemia    • Obesity      TRUNKAL   • On home oxygen therapy     REPORTS SHE WEARS AT 1L [facility] HS   • Otitis externa    • Peptic ulcer disease     noted on EGD, November 2013   • Pneumonia    • Pulmonary embolus (CMS/HCC) 01/01/2012   • RLS (restless legs syndrome)    • Wears glasses    • Wears partial dentures     FULL UPPER PLATE AND PARTIAL LOWER PLATE       Allergies   Allergen Reactions   • Lisinopril Cough       Past Surgical History:   Procedure Laterality Date   • BREAST EXCISIONAL BIOPSY Bilateral     Patient unsure of dates-    • BREAST SURGERY Bilateral     lumpectomies, PATIENT REPORTS BENIGN AND THAT THERE ARE NO RESTRICTIONS ON BUE'S   • CATARACT EXTRACTION Bilateral    • COLONOSCOPY N/A 9/27/2017    Procedure: COLONOSCOPY WITH COLD BIOPSY POLYPECTOMY; BIOPSIES;  Surgeon: Severiano Calloway MD;  Location: [facility] ENDOSCOPY;  Service:    • CORONARY ANGIOPLASTY  10/01/2012    Nonobstructive coronary artery disease with normal left ventricular function    • HYSTERECTOMY     • OOPHORECTOMY         Family History   Problem Relation Age of Onset   • Heart attack Mother    • Stroke Mother    • Heart attack Father    • Stroke Father    • Heart attack Brother    • Breast cancer Neg Hx    • Ovarian cancer Neg Hx    • Colon cancer Neg Hx        Social History     Socioeconomic History   • Marital status:      Spouse name: Not on file   • Number of children: Not on file   • Years of education: Not on file   • Highest education level: Not on file   Tobacco Use   • Smoking status: Former Smoker     Packs/day: 3.00     Years: 35.00     Pack years: 105.00     Types: Cigarettes     Last attempt to quit: 2005     Years since quitting: 15.1   • Smokeless tobacco: Never Used   Substance and Sexual Activity   • Alcohol use: Yes     Comment: REPORTS HAS SEVERAL MIXED DRINKS WEEKLY   • Drug use: No   • Sexual activity: Defer   Social History Narrative    Pt consumes 2-3 servings of caffeine  per day.            Objective   Physical Exam   Constitutional: She is oriented to person, place, and time. She appears well-developed and well-nourished. No distress.   Afebrile vital signs stable cheerful smiling no acute distress   HENT:   Head: Normocephalic and atraumatic.   Mouth/Throat: Oropharynx is clear and moist. No oropharyngeal exudate.   Small bleeding noted anterior nares left   Eyes: Pupils are equal, round, and reactive to light. Conjunctivae and EOM are normal.   Neck: Normal range of motion. Neck supple.   Cardiovascular: Normal rate, regular rhythm and intact distal pulses.   Pulmonary/Chest: Effort normal and breath sounds normal.   Abdominal: Soft.   Musculoskeletal: Normal range of motion. She exhibits no edema.   Neurological: She is alert and oriented to person, place, and time. She has normal strength. No cranial nerve deficit or sensory deficit. She exhibits normal muscle tone. Coordination normal. GCS eye subscore is 4. GCS verbal subscore is 5. GCS motor subscore is 6.   Skin: Skin is warm and dry. Capillary refill takes less than 2 seconds. No rash noted. She is not diaphoretic.   Psychiatric: Her behavior is normal. Judgment and thought content normal.   Somewhat anxious   Nursing note and vitals reviewed.      Epistaxis Management  Date/Time: 2/21/2020 12:23 PM  Performed by: Williams Ramirez PA-C  Authorized by: Jose Loaiza MD     Consent:     Consent obtained:  Verbal    Consent given by:  Patient  Anesthesia (see MAR for exact dosages):     Anesthesia method:  Topical application    Topical anesthesia: Alexander-Synephrine.  Procedure details:     Treatment site:  L anterior    Treatment method:  Anterior pack (Vaseline gauze)    Treatment complexity:  Limited    Treatment episode: initial    Post-procedure details:     Assessment:  Bleeding stopped    Patient tolerance of procedure:  Tolerated well, no immediate complications               ED Course  ED Course as of Feb 21  1224   Fri Feb 21, 2020   1138 Patient is sitting upright resting comfortably no distress continue monitoring    [SC]   1152 Patient continues with controlled epistaxis drinking fluids eating some snacks currently continue monitoring    [SC]   1221 Patient with continued epistaxis the clamp has been removed from her nose and the gauze packing removed no further bleeding noted    [SC]      ED Course User Index  [SC] Williams Ramirez, YANELY                                           MDM  Number of Diagnoses or Management Options     Amount and/or Complexity of Data Reviewed  Review and summarize past medical records: yes  Discuss the patient with other providers: yes    Risk of Complications, Morbidity, and/or Mortality  Presenting problems: low  Diagnostic procedures: low  Management options: low        Final diagnoses:   Epistaxis            Williams Ramirez PA-C  02/21/20 1222       Williams Ramirez PA-C  02/21/20 1224

## 2020-03-05 RX ORDER — HYDROCHLOROTHIAZIDE 12.5 MG/1
12.5 TABLET ORAL DAILY
Qty: 30 TABLET | Refills: 5 | Status: SHIPPED | OUTPATIENT
Start: 2020-03-05 | End: 2020-12-14

## 2020-03-10 ENCOUNTER — TELEPHONE (OUTPATIENT)
Dept: INTERNAL MEDICINE | Facility: CLINIC | Age: 70
End: 2020-03-10

## 2020-03-10 NOTE — TELEPHONE ENCOUNTER
DAUGHTER SAMUEL  CALLED AND MADE AN APPT. FOR PT FOR 3/12. PT WAS PUT ON WAITING LIST AND DAUGHTER WOULD LIKE TO BE NOTIFIED WHEN/IF THERE IS A CANCELLATION, STATED THAT THE PT GETS CONFUSED.    PLEASE ADVISE.  DAUGHTER(POA) CALL BACK:  [phone]

## 2020-03-12 ENCOUNTER — OFFICE VISIT (OUTPATIENT)
Dept: INTERNAL MEDICINE | Facility: CLINIC | Age: 70
End: 2020-03-12

## 2020-03-12 VITALS
RESPIRATION RATE: 16 BRPM | WEIGHT: 198 LBS | DIASTOLIC BLOOD PRESSURE: 56 MMHG | SYSTOLIC BLOOD PRESSURE: 131 MMHG | HEART RATE: 62 BPM | BODY MASS INDEX: 30.01 KG/M2 | OXYGEN SATURATION: 99 % | HEIGHT: 68 IN | TEMPERATURE: 97.2 F

## 2020-03-12 DIAGNOSIS — J01.40 ACUTE PANSINUSITIS, RECURRENCE NOT SPECIFIED: Primary | ICD-10-CM

## 2020-03-12 PROCEDURE — 99213 OFFICE O/P EST LOW 20 MIN: CPT | Performed by: NURSE PRACTITIONER

## 2020-03-12 RX ORDER — AMOXICILLIN AND CLAVULANATE POTASSIUM 875; 125 MG/1; MG/1
1 TABLET, FILM COATED ORAL 2 TIMES DAILY
Qty: 20 TABLET | Refills: 0 | Status: SHIPPED | OUTPATIENT
Start: 2020-03-12 | End: 2020-03-22
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
## 2020-06-04 RX ORDER — FUROSEMIDE 20 MG/1
TABLET ORAL
Qty: 36 TABLET | Refills: 1 | Status: SHIPPED | OUTPATIENT
Start: 2020-06-04 | End: 2021-02-24

## 2020-08-24 ENCOUNTER — OFFICE VISIT (OUTPATIENT)
Dept: CARDIOLOGY | Facility: CLINIC | Age: 70
End: 2020-08-24

## 2020-08-24 VITALS
OXYGEN SATURATION: 97 % | BODY MASS INDEX: 28.79 KG/M2 | HEART RATE: 86 BPM | SYSTOLIC BLOOD PRESSURE: 146 MMHG | DIASTOLIC BLOOD PRESSURE: 64 MMHG | WEIGHT: 190 LBS | HEIGHT: 68 IN

## 2020-08-24 DIAGNOSIS — I65.23 OCCLUSION AND STENOSIS OF BILATERAL CAROTID ARTERIES: ICD-10-CM

## 2020-08-24 DIAGNOSIS — I10 ESSENTIAL HYPERTENSION: ICD-10-CM

## 2020-08-24 DIAGNOSIS — E78.2 MIXED HYPERLIPIDEMIA: ICD-10-CM

## 2020-08-24 DIAGNOSIS — I48.0 PAROXYSMAL ATRIAL FIBRILLATION (HCC): ICD-10-CM

## 2020-08-24 DIAGNOSIS — I25.10 CORONARY ARTERY DISEASE INVOLVING NATIVE CORONARY ARTERY OF NATIVE HEART WITHOUT ANGINA PECTORIS: Primary | ICD-10-CM

## 2020-08-24 DIAGNOSIS — I67.9 CVD (CEREBROVASCULAR DISEASE): ICD-10-CM

## 2020-08-24 PROCEDURE — 93000 ELECTROCARDIOGRAM COMPLETE: CPT | Performed by: INTERNAL MEDICINE

## 2020-08-24 PROCEDURE — 99214 OFFICE O/P EST MOD 30 MIN: CPT | Performed by: INTERNAL MEDICINE

## 2020-08-24 NOTE — PROGRESS NOTES
[facility]  Office Progress Note  Nery Garcia  1950      Visit Date: 08/24/20    PCP: Vermeesch, Marilyn K, MD  [address] [place] [id number]    IDENTIFICATION: A 69 y.o.  retired  from [employer]     PROBLEM LIST:  1. Carotid artery disease.  a. Carotid MRA, January 2012, revealing 50% to 75% right ICA stenosis.  b. April 2018, carotid duplex with 70% stenosis on the right, less than 50% on the left. Dr Jackson saldivar, pt deferred future fu   c. 4/19 CUS <70% bilat  2. Nonobstructive coronary artery disease with normal left ventricular function per cardiac catheterization in October 2012.  3. Atrial fibrillation.  a. Initial diagnosis in November 2010.  b. Previous Multaq therapy - currently on propafenone.  c. CHADS score equal to 1 with Pradaxa therapy.  4. Pulmonary embolus, January 2012.  5. Hypertension - labile.  6. Dyslipidemia.  a. July 2014: Total cholesterol 195, triglycerides 110, HDL 64, .   b. 7/17/2019  TG 90 HDL 83 LDL 72  7. History of tobacco abuse with cessation in 2006.  8. Chronic obstructive pulmonary disease.  9. Gastroesophageal reflux disease.  10. Peptic ulcer disease noted on EGD, November 2013.  11. Restless leg syndrome.  12. Anxiety.  13. Fatty liver per evaluation, November 2013.  14. Dementia followed per Dr. Chandler at [facility]. 3 sisters with precocious dementia  15. Questionable sleep apnea.  16. Surgical history.  a. Hysterectomy.  b. Bilateral breast lumpectomies  c. Cataract extractions         Chief Complaint   Patient presents with   • Coronary Artery Disease       Allergies  Allergies   Allergen Reactions   • Lisinopril Cough       Current Medications    Current Outpatient Medications:   •  amLODIPine (NORVASC) 10 MG tablet, Take 1 tablet by mouth Daily., Disp: 90 tablet, Rfl: 1  •  apixaban (ELIQUIS) 5 MG tablet tablet, Take 5 mg by mouth 2 (Two) Times a Day., Disp:  , Rfl:   •  aspirin 81 MG tablet, Take 81 mg by mouth Daily., Disp: , Rfl:   •  atorvastatin (LIPITOR) 40 MG tablet, Take 1 tablet by mouth Daily., Disp: 90 tablet, Rfl: 3  •  calcium-vitamin D (OSCAL 500/200 D-3) 500-200 MG-UNIT per tablet, Take 1 tablet by mouth Daily., Disp: , Rfl:   •  donepezil (ARICEPT) 10 MG tablet, Take 1 tablet by mouth every night., Disp: , Rfl:   •  escitalopram (LEXAPRO) 20 MG tablet, Take 20 mg by mouth Daily., Disp: , Rfl:   •  fluticasone (FLONASE) 50 MCG/ACT nasal spray, 2 sprays into each nostril Daily As Needed for Rhinitis or Allergies., Disp: , Rfl:   •  furosemide (LASIX) 20 MG tablet, TAKE 1 TABLET BY MOUTH ON MONDAY, WEDNESDAY AND FRIDAY, Disp: 36 tablet, Rfl: 1  •  hydroCHLOROthiazide (HYDRODIURIL) 12.5 MG tablet, Take 1 tablet by mouth Daily., Disp: 30 tablet, Rfl: 5  •  losartan (COZAAR) 100 MG tablet, Take 1 tablet by mouth Daily., Disp: 90 tablet, Rfl: 1  •  memantine (NAMENDA) 5 MG tablet, Take 10 mg by mouth 2 (Two) Times a Day., Disp: , Rfl:   •  montelukast (SINGULAIR) 10 MG tablet, Take 1 tablet by mouth Daily., Disp: 90 tablet, Rfl: 3  •  Multiple Vitamin (MULTI-DAY VITAMINS) tablet, Take 1 tablet by mouth daily., Disp: , Rfl:   •  O2 (OXYGEN), Inhale 1 L/min Every Night., Disp: , Rfl:   •  pantoprazole (PROTONIX) 40 MG EC tablet, Take 1 tablet by mouth Daily., Disp: 90 tablet, Rfl: 3  •  sotalol (BETAPACE) 80 MG tablet, TAKE 1/2 (ONE-HALF) TABLET BY MOUTH TWICE DAILY, Disp: 90 tablet, Rfl: 1  •  vitamin B-12 (CYANOCOBALAMIN) 1000 MCG tablet, Take 500 mcg by mouth Daily., Disp: , Rfl:       History of Present Illness   Nery Garcia is a 69 y.o. year old female here for follow up.  Memory impairment is progressive.  She is accompanied by her family.    Pt denies any chest pain, dyspnea, dyspnea on exertion, orthopnea, PND, palpitations, lower extremity edema, or claudication.      OBJECTIVE:  Vitals:    08/24/20 1136   BP: 146/64   BP Location: Right arm  "  Patient Position: Sitting   Pulse: 86   SpO2: 97%   Weight: 86.2 kg (190 lb)   Height: 172.7 cm (68\")     Physical Exam   Constitutional: She appears well-developed and well-nourished.   Neck: Normal range of motion. Neck supple. No hepatojugular reflux and no JVD present. Carotid bruit is not present. No tracheal deviation present. No thyromegaly present.   Cardiovascular: Normal rate, regular rhythm, S1 normal, S2 normal, intact distal pulses and normal pulses. PMI is not displaced. Exam reveals no gallop, no distant heart sounds, no friction rub, no midsystolic click and no opening snap.   No murmur heard.  Pulses:       Radial pulses are 2+ on the right side, and 2+ on the left side.        Dorsalis pedis pulses are 2+ on the right side, and 2+ on the left side.        Posterior tibial pulses are 2+ on the right side, and 2+ on the left side.   Pulmonary/Chest: Effort normal and breath sounds normal. She has no wheezes. She has no rales.   Abdominal: Soft. Bowel sounds are normal. She exhibits no mass. There is no tenderness. There is no guarding.       Diagnostic Data:    ECG 12 Lead  Date/Time: 8/24/2020 12:02 PM  Performed by: Mega Kendall MD  Authorized by: Mega Kendall MD   Previous ECG: no previous ECG available  Rhythm: sinus rhythm  BPM: 84  Conduction: right bundle branch block    Clinical impression: abnormal EKG             ASSESSMENT:   Diagnosis Plan   1. Coronary artery disease involving native coronary artery of native heart without angina pectoris     2. CVD (cerebrovascular disease)     3. Essential hypertension     4. Mixed hyperlipidemia     5. Paroxysmal atrial fibrillation (CMS/HCC)         PLAN:  Cad nonobstructive historically.  Continue current Rx    CCVD pt has deferred further evaluation    htn controlled amlod/losartan    Hl on statin    paf sr on sotalol/apixaban    Vermeesch, Marilyn K, MD, thank you for referring Ms. Garcia for evaluation.  I have forwarded my " electronically generated recommendations to you for review.  Please do not hesitate to call with any questions.      Mega Kendall MD, FACC

## 2020-10-12 ENCOUNTER — HOSPITAL ENCOUNTER (OUTPATIENT)
Dept: CARDIOLOGY | Facility: HOSPITAL | Age: 70
Discharge: HOME | End: 2020-10-12
Admitting: INTERNAL MEDICINE

## 2020-10-12 DIAGNOSIS — I67.9 CVD (CEREBROVASCULAR DISEASE): ICD-10-CM

## 2020-10-12 DIAGNOSIS — I65.23 OCCLUSION AND STENOSIS OF BILATERAL CAROTID ARTERIES: ICD-10-CM

## 2020-10-12 PROCEDURE — 93880 EXTRACRANIAL BILAT STUDY: CPT | Performed by: INTERNAL MEDICINE

## 2020-10-12 PROCEDURE — 93880 EXTRACRANIAL BILAT STUDY: CPT

## 2020-10-13 ENCOUNTER — TELEPHONE (OUTPATIENT)
Dept: CARDIOLOGY | Facility: CLINIC | Age: 70
End: 2020-10-13

## 2020-10-13 LAB
BH CV ECHO MEAS - BSA(HAYCOCK): 2 M^2
BH CV ECHO MEAS - BSA: 2 M^2
BH CV ECHO MEAS - BZI_BMI: 28.1 KILOGRAMS/M^2
BH CV ECHO MEAS - BZI_METRIC_HEIGHT: 172.7 CM
BH CV ECHO MEAS - BZI_METRIC_WEIGHT: 83.9 KG
BH CV XLRA MEAS LEFT DIST CCA EDV: 18.2 CM/SEC
BH CV XLRA MEAS LEFT DIST CCA PSV: 89.3 CM/SEC
BH CV XLRA MEAS LEFT DIST ICA EDV: 25.8 CM/SEC
BH CV XLRA MEAS LEFT DIST ICA PSV: 124 CM/SEC
BH CV XLRA MEAS LEFT ICA/CCA RATIO: 1.4
BH CV XLRA MEAS LEFT MID CCA EDV: 18.2 CM/SEC
BH CV XLRA MEAS LEFT MID CCA PSV: 90.5 CM/SEC
BH CV XLRA MEAS LEFT MID ICA EDV: 28.3 CM/SEC
BH CV XLRA MEAS LEFT MID ICA PSV: 102 CM/SEC
BH CV XLRA MEAS LEFT PROX CCA EDV: 14.5 CM/SEC
BH CV XLRA MEAS LEFT PROX CCA PSV: 91.1 CM/SEC
BH CV XLRA MEAS LEFT PROX ECA EDV: 11.3 CM/SEC
BH CV XLRA MEAS LEFT PROX ECA PSV: 104 CM/SEC
BH CV XLRA MEAS LEFT PROX ICA EDV: 18.2 CM/SEC
BH CV XLRA MEAS LEFT PROX ICA PSV: 90.5 CM/SEC
BH CV XLRA MEAS LEFT PROX SCLA PSV: 94.9 CM/SEC
BH CV XLRA MEAS LEFT VERTEBRAL A EDV: 9.1 CM/SEC
BH CV XLRA MEAS LEFT VERTEBRAL A PSV: 52.4 CM/SEC
BH CV XLRA MEAS RIGHT DIST CCA EDV: 15.4 CM/SEC
BH CV XLRA MEAS RIGHT DIST CCA PSV: 83.8 CM/SEC
BH CV XLRA MEAS RIGHT DIST ICA EDV: 24.5 CM/SEC
BH CV XLRA MEAS RIGHT DIST ICA PSV: 94.3 CM/SEC
BH CV XLRA MEAS RIGHT ICA/CCA RATIO: 2.7
BH CV XLRA MEAS RIGHT MID CCA EDV: 14.9 CM/SEC
BH CV XLRA MEAS RIGHT MID CCA PSV: 89.9 CM/SEC
BH CV XLRA MEAS RIGHT MID ICA EDV: 42.4 CM/SEC
BH CV XLRA MEAS RIGHT MID ICA PSV: 223 CM/SEC
BH CV XLRA MEAS RIGHT PROX CCA EDV: 10.5 CM/SEC
BH CV XLRA MEAS RIGHT PROX CCA PSV: 94.8 CM/SEC
BH CV XLRA MEAS RIGHT PROX ECA EDV: 9.4 CM/SEC
BH CV XLRA MEAS RIGHT PROX ECA PSV: 135 CM/SEC
BH CV XLRA MEAS RIGHT PROX ICA EDV: 39.3 CM/SEC
BH CV XLRA MEAS RIGHT PROX ICA PSV: 216 CM/SEC
BH CV XLRA MEAS RIGHT PROX SCLA PSV: 107 CM/SEC
BH CV XLRA MEAS RIGHT VERTEBRAL A EDV: 12.6 CM/SEC
BH CV XLRA MEAS RIGHT VERTEBRAL A PSV: 53.4 CM/SEC

## 2020-10-23 ENCOUNTER — OFFICE VISIT (OUTPATIENT)
Dept: INTERNAL MEDICINE | Facility: CLINIC | Age: 70
End: 2020-10-23

## 2020-10-23 VITALS
DIASTOLIC BLOOD PRESSURE: 70 MMHG | HEIGHT: 68 IN | TEMPERATURE: 98.1 F | SYSTOLIC BLOOD PRESSURE: 110 MMHG | OXYGEN SATURATION: 94 % | WEIGHT: 180 LBS | BODY MASS INDEX: 27.28 KG/M2 | HEART RATE: 70 BPM

## 2020-10-23 DIAGNOSIS — H60.331 ACUTE SWIMMER'S EAR OF RIGHT SIDE: Primary | ICD-10-CM

## 2020-10-23 PROCEDURE — 99213 OFFICE O/P EST LOW 20 MIN: CPT | Performed by: INTERNAL MEDICINE

## 2020-10-23 RX ORDER — OFLOXACIN 3 MG/ML
5 SOLUTION AURICULAR (OTIC) 2 TIMES DAILY
Qty: 10 ML | Refills: 0 | Status: SHIPPED | OUTPATIENT
Start: 2020-10-23 | End: 2020-11-06

## 2020-10-23 NOTE — PROGRESS NOTES
"Chief Complaint   Patient presents with   • Earache     right ear pain, had some drainage earlier this week. Patient does have a hole in her eardrum and has issues with ear   • Dizziness   • Headache     Subjective   Novel Azael Garcia is a 69 y.o. female.     5 days ago daughter checked on her and she had HA, dizziness and confusion.  A few days later she had drainage from ear.  Had a low grade temp of 99.5 three days ago.  Daughter noticed that patient was scratching in her ear with a Q-tip several days ago.  Daughter also notes that patient has had decreased appetite for quite some time now.  Upon review of chart I note that patient has had 20 pound weight loss over past 8 months.  Her dementia is worsening significantly per daughter.    Earache   There is pain in the right ear. This is a new problem. The problem occurs constantly. The problem has been waxing and waning. There has been no fever. Associated symptoms include ear discharge, headaches and rhinorrhea. Pertinent negatives include no coughing or sore throat. She has tried acetaminophen for the symptoms. The treatment provided mild relief.        The following portions of the patient's history were reviewed and updated as appropriate: allergies, current medications, past family history, past medical history, past social history, past surgical history and problem list.    Review of Systems   Constitutional: Positive for appetite change. Negative for activity change.   HENT: Positive for ear discharge, ear pain and rhinorrhea. Negative for sore throat.    Respiratory: Negative for cough.    Neurological: Positive for headaches.   Psychiatric/Behavioral: Positive for confusion.       Objective   /70   Pulse 70   Temp 98.1 °F (36.7 °C)   Ht 172.7 cm (68\")   Wt 81.6 kg (180 lb)   SpO2 94%   BMI 27.37 kg/m²   Body mass index is 27.37 kg/m².  Physical Exam  Vitals signs and nursing note reviewed.   Constitutional:       General: She is not in acute " distress.     Appearance: Normal appearance. She is not ill-appearing.      Comments: Kind and pleasant elderly female, wearing a mask, appears older than stated age   HENT:      Head: Normocephalic and atraumatic.      Left Ear: Tympanic membrane, ear canal and external ear normal.      Ears:      Comments: Small scratch in upper pinna area, significant amount of white discharge in ear canal that is obstructing tympanic membrane  Eyes:      General:         Right eye: No discharge.         Left eye: No discharge.      Extraocular Movements: Extraocular movements intact.   Cardiovascular:      Rate and Rhythm: Normal rate and regular rhythm.      Heart sounds: No murmur.   Pulmonary:      Effort: Pulmonary effort is normal. No respiratory distress.      Breath sounds: Normal breath sounds.   Lymphadenopathy:      Cervical: No cervical adenopathy.   Neurological:      General: No focal deficit present.      Mental Status: She is alert.      Cranial Nerves: No cranial nerve deficit.      Gait: Gait normal.   Psychiatric:         Mood and Affect: Mood normal.         Thought Content: Thought content normal.      Comments: Patient is confused, daughter answers the majority of questions due to underlying dementia         Assessment/Plan   Nery Azael Garcia is here today and the following problems have been addressed:      Diagnoses and all orders for this visit:    1. Acute swimmer's ear of right side (Primary)    Other orders  -     ofloxacin (FLOXIN) 0.3 % otic solution; Administer 5 drops to the right ear 2 (Two) Times a Day.  Dispense: 10 mL; Refill: 0    Floxin 5 drops to right ear twice daily provided  Recommend heat to ear for 10 to 15 minutes 3 times daily  May continue Tylenol as needed for pain    Return to clinic in 2 weeks for routine follow-up visit    Please note that portions of this note were completed with a voice recognition program.  Efforts were made to edit dictation, but occasionally words are  mistranscribed.

## 2020-11-02 RX ORDER — LOSARTAN POTASSIUM 100 MG/1
TABLET ORAL
Qty: 90 TABLET | Refills: 3 | Status: SHIPPED | OUTPATIENT
Start: 2020-11-02 | End: 2021-09-23 | Stop reason: SDUPTHER

## 2020-11-04 RX ORDER — SOTALOL HYDROCHLORIDE 80 MG/1
TABLET ORAL
Qty: 90 TABLET | Refills: 3 | Status: SHIPPED | OUTPATIENT
Start: 2020-11-04 | End: 2021-11-16

## 2020-11-06 ENCOUNTER — OFFICE VISIT (OUTPATIENT)
Dept: INTERNAL MEDICINE | Facility: CLINIC | Age: 70
End: 2020-11-06

## 2020-11-06 VITALS
OXYGEN SATURATION: 96 % | DIASTOLIC BLOOD PRESSURE: 60 MMHG | HEART RATE: 55 BPM | SYSTOLIC BLOOD PRESSURE: 112 MMHG | BODY MASS INDEX: 27.89 KG/M2 | TEMPERATURE: 97.1 F | WEIGHT: 184 LBS | HEIGHT: 68 IN

## 2020-11-06 DIAGNOSIS — R73.9 HYPERGLYCEMIA: ICD-10-CM

## 2020-11-06 DIAGNOSIS — K21.9 GASTROESOPHAGEAL REFLUX DISEASE WITHOUT ESOPHAGITIS: ICD-10-CM

## 2020-11-06 DIAGNOSIS — F32.0 CURRENT MILD EPISODE OF MAJOR DEPRESSIVE DISORDER WITHOUT PRIOR EPISODE (HCC): ICD-10-CM

## 2020-11-06 DIAGNOSIS — I48.0 PAROXYSMAL ATRIAL FIBRILLATION (HCC): ICD-10-CM

## 2020-11-06 DIAGNOSIS — K59.09 CHRONIC CONSTIPATION: ICD-10-CM

## 2020-11-06 DIAGNOSIS — I10 ESSENTIAL HYPERTENSION: Primary | ICD-10-CM

## 2020-11-06 DIAGNOSIS — G47.33 OBSTRUCTIVE SLEEP APNEA SYNDROME: ICD-10-CM

## 2020-11-06 DIAGNOSIS — F03.90 DEMENTIA WITHOUT BEHAVIORAL DISTURBANCE, UNSPECIFIED DEMENTIA TYPE: ICD-10-CM

## 2020-11-06 DIAGNOSIS — E78.2 MIXED HYPERLIPIDEMIA: ICD-10-CM

## 2020-11-06 DIAGNOSIS — F03.90 DEMENTIA WITHOUT BEHAVIORAL DISTURBANCE, UNSPECIFIED DEMENTIA TYPE: Primary | ICD-10-CM

## 2020-11-06 PROBLEM — H60.331 ACUTE SWIMMER'S EAR OF RIGHT SIDE: Status: RESOLVED | Noted: 2020-10-23 | Resolved: 2020-11-06

## 2020-11-06 PROCEDURE — 90694 VACC AIIV4 NO PRSRV 0.5ML IM: CPT | Performed by: INTERNAL MEDICINE

## 2020-11-06 PROCEDURE — 99214 OFFICE O/P EST MOD 30 MIN: CPT | Performed by: INTERNAL MEDICINE

## 2020-11-06 PROCEDURE — G0008 ADMIN INFLUENZA VIRUS VAC: HCPCS | Performed by: INTERNAL MEDICINE

## 2020-11-06 NOTE — PROGRESS NOTES
Chief Complaint   Patient presents with   • Follow-up     for anxiety, dementia, depression, GERD, HLD, and HTN. Daughter states Pt's been complaining of dizziness.      Subjective   Novel Azael Garcia is a 69 y.o. female.     Here today for follow up of HTN, HLD, Afib, CAD, carotid disease, COPD, PAT, GERD, dementia, anx/depression, RLS.   HTN/HLD/Afib/CAD- her BP and heart rate are well controlled today.  She denies any CP, SOA, palpitations.  No edema.  She has been complaining of dizziness lately.  It should be noted that patient has lost approximately 14 pounds over the past 8 months.  Her daughters are now bringing her food and sitting with her to be certain that she is eating.  Her dementia is worsening as noted below.  Daughters will monitor her blood pressure closely as I feel this is likely cause of her dizziness.  Weight loss is likely cause of decrease in blood pressure and this is likely cause of dizziness.  I have asked daughters to contact me next week with blood pressure readings  COPD/PAT- She is using oxygen at night, she is not using any inhalers.  She denies wheezing or any inhaler use.    GERD- she remains on protonix daily  Dementia- she remains on aricept and namenda.  Family has requested social work consult to help with home situation.  Family checks on her often.  She is no longer able to care for herself and they are requesting help with home situation.  Anx/depr-she is currently on Lexapro.  Pt denies anxiety and depression but her daughter feels her mood is up and down.  She does not always want to bathe.  She is no longer cooking for herself.  RLS- she denies any RLS sxs. She does have an occasional tremor.    HCM- Mammogram is past due since April.  Colonoscopy completed 2017.  DEXA is UTD. Pneumoccal vaccines are UTD.  She needs her flu vaccine today.   Patient had been drinking bourbon and beer on a daily basis.  Recently daughters have taken her 's license away from her as  "well as her keys.  Patient is very unhappy about this.  They have found most of her hiding places in regards to her alcohol.  As far as they know she has not had any alcohol for approximately 1 week.  Her only complaint has been dizziness and intermittent headache.  Her headaches are essentially resolved now but she is complaining of dizziness as noted above.  I suspect this may be due to low blood pressures from her weight loss over the past several months.  Her daughters are bringing her food, in the past she would let food sit and not eat.  Daughters are now making sure that she is eating prior to their leaving.       The following portions of the patient's history were reviewed and updated as appropriate: allergies, current medications, past family history, past medical history, past social history, past surgical history and problem list.    Review of Systems   Constitutional: Positive for unexpected weight change. Negative for activity change and appetite change.   Eyes: Negative for visual disturbance.   Respiratory: Negative for shortness of breath.    Cardiovascular: Negative for chest pain, palpitations and leg swelling.   Gastrointestinal: Negative for abdominal pain.   Neurological: Positive for dizziness, tremors and headaches.   Psychiatric/Behavioral: Positive for confusion and dysphoric mood. Negative for agitation, behavioral problems and sleep disturbance. The patient is not nervous/anxious.        Objective   /60   Pulse 55   Temp 97.1 °F (36.2 °C)   Ht 172.7 cm (68\")   Wt 83.5 kg (184 lb)   SpO2 96%   BMI 27.98 kg/m²   Body mass index is 27.98 kg/m².  Physical Exam  Vitals signs and nursing note reviewed.   Constitutional:       General: She is not in acute distress.     Appearance: Normal appearance. She is well-developed. She is not ill-appearing.      Comments: Very kind and pleasant female, she appears her age and is in no distress today   HENT:      Head: Normocephalic and " atraumatic.      Right Ear: External ear normal.      Left Ear: External ear normal.   Eyes:      General:         Right eye: No discharge.         Left eye: No discharge.      Extraocular Movements: Extraocular movements intact.      Conjunctiva/sclera: Conjunctivae normal.      Pupils: Pupils are equal, round, and reactive to light.   Neck:      Musculoskeletal: Normal range of motion and neck supple.      Thyroid: No thyromegaly.   Cardiovascular:      Rate and Rhythm: Normal rate and regular rhythm.      Pulses: Normal pulses.      Heart sounds: Normal heart sounds. No murmur.      Comments: No carotid bruits  Pulmonary:      Effort: Pulmonary effort is normal. No respiratory distress.      Breath sounds: Normal breath sounds. No wheezing.   Abdominal:      General: Bowel sounds are normal. There is no distension.      Palpations: Abdomen is soft. There is no mass.      Tenderness: There is no abdominal tenderness.   Musculoskeletal: Normal range of motion.      Right lower leg: No edema.      Left lower leg: No edema.   Lymphadenopathy:      Cervical: No cervical adenopathy.   Neurological:      General: No focal deficit present.      Mental Status: She is alert. Mental status is at baseline. She is disoriented.      Cranial Nerves: No cranial nerve deficit.      Motor: No weakness.      Coordination: Coordination normal.      Gait: Gait normal.   Psychiatric:         Mood and Affect: Mood normal.         Behavior: Behavior normal.      Comments: Very kind and pleasant female, she tries to answer most questions however often looks to her daughter for help with answers, she is often confused and cannot answer most questions         Assessment/Plan   Nery Garcia is here today and the following problems have been addressed:      Diagnoses and all orders for this visit:    1. Essential hypertension (Primary)  -     CBC & Differential  -     Comprehensive Metabolic Panel    2. Mixed hyperlipidemia  -      Comprehensive Metabolic Panel  -     Lipid Panel With / Chol / HDL Ratio    3. Obstructive sleep apnea syndrome    4. Gastroesophageal reflux disease without esophagitis    5. Chronic constipation    6. Dementia without behavioral disturbance, unspecified dementia type (CMS/HCC)    7. Current mild episode of major depressive disorder without prior episode (CMS/HCC)    8. Hyperglycemia  -     Comprehensive Metabolic Panel  -     Hemoglobin A1c    9. Paroxysmal atrial fibrillation (CMS/HCC)    Other orders  -     Fluad Quad 65+ yrs (9918-7600)      Follow heart healthy/low salt diet  Avoid processed foods  Monitor blood pressure as discussed-daughter will contact me next week with blood pressure readings.  If systolic blood pressure is consistently less than 115, I am going to discontinue hydrochlorothiazide  Exercise as tolerated   Take all medications as prescribed  Social work consult has been ordered earlier due to underlying dementia and home situation  Continue Prevacid, GERD is well controlled  Her depression is doing fairly well with current dose of Lexapro  Continue Namenda and Aricept for worsening dementia-follow-up with neurologist at   Heart rate is well controlled, continue sotalol and Eliquis for underlying atrial fibrillation  Flu vaccine given today    Return in about 4 months (around 3/6/2021) for Next scheduled follow up.      Marilyn K. Vermeesch, MD      Please note that portions of this note were completed with a voice recognition program.  Efforts were made to edit dictation, but occasionally words are mistranscribed.

## 2020-11-09 ENCOUNTER — PATIENT OUTREACH (OUTPATIENT)
Dept: CASE MANAGEMENT | Facility: OTHER | Age: 70
End: 2020-11-09

## 2020-11-09 NOTE — OUTREACH NOTE
"Patient Outreach Note    SW reached out to speak with pt but pt's daughter Arianna answered. Arianna asked VIKTOR if she could offer some resources for pt. VIKTOR stated yes absolutely. Arianna stated, \"Mom lives alone in a house across the street from my sister, but we both go over to check on her. It is just becoming more than we can handle. Her dementia is really getting worse and we decided to take her keys away and that just set her off. She has now started screaming at us and telling us if we don't give her her keys back she will just kill herself. Since mom sees the doctor at [facility] at , we called their SW to help us with this. The SW talked us through what to do and what to say when she starts talking like that\". Arianna stated, \" We have a telehealth appt tomorrow with [facility] and I will ask the doctor to adjust her depression medication. Maybe that will help her. She not only gets angry about the car situation, she also gets angry about taking a bath. She hates it\". Arianna stated and we can only get her to eat when we are here with her. Do you think the doctor can order HH for her so we can get some help?\" VIKTOR told Arianna that HH is mainly for PT/OT and it sounds like the pt is needing more personal care. VIKTOR provided Arianna with a list of local personal care agencies. VIKTOR also told Arianna about the Medicaid Waiver program. Arianna stated, \" Ok thank you. This is all very helpful information\". VIKTOR told Arianna if she had any other questions that she is welcome to call VIKTOR.   CADEN Burnham  Ambulatory     11/9/2020, 12:28 EST      "

## 2020-11-10 ENCOUNTER — EPISODE CHANGES (OUTPATIENT)
Dept: CASE MANAGEMENT | Facility: OTHER | Age: 70
End: 2020-11-10

## 2020-11-10 RX ORDER — BUPROPION HYDROCHLORIDE 150 MG/1
150 TABLET ORAL DAILY
Start: 2020-11-10 | End: 2021-10-25

## 2020-11-10 NOTE — TELEPHONE ENCOUNTER
----- Message from Marilyn K Vermeesch, MD sent at 11/10/2020  4:23 PM EST -----  Regarding: FW: Non-Urgent Medical Question  Contact: [phone]  If you could, please add Wellbutrin  mg daily to her medication list.  Thank you    ----- Message -----  From: Merle Campoverde MA  Sent: 11/10/2020   4:20 PM EST  To: Marilyn K Vermeesch, MD  Subject: FW: Non-Urgent Medical Question                    ----- Message -----  From: Nery Addison Jose  Sent: 11/10/2020   4:06 PM EST  To: Christos You [facility]  Subject: Non-Urgent Medical Question                      This is not a question just information sharing. Today, we had a zoom meeting with Matilda Arambula of the [facility] regarding Mom. She hadn't seen them since October 2019 so it was overdue. During the evaluation, they decided to add a medication for Mom. They have prescribed Bupropion  mg to be taken once daily in the AM. Providing there are no adverse reactions, they plan to continue the medication to help with Mom's depression. Just wanted to make you aware. If you need to contact us, please feel free to do so.    Rama Garcia  Daughter of Nery Garcia  cell: [phone]   email: [email or web address]

## 2020-11-13 DIAGNOSIS — D64.9 ANEMIA, UNSPECIFIED TYPE: Primary | ICD-10-CM

## 2020-11-13 LAB
ALBUMIN SERPL-MCNC: 4 G/DL (ref 3.5–5.2)
ALBUMIN/GLOB SERPL: 2 G/DL
ALP SERPL-CCNC: 52 U/L (ref 39–117)
ALT SERPL-CCNC: 31 U/L (ref 1–33)
AST SERPL-CCNC: 28 U/L (ref 1–32)
BASOPHILS # BLD AUTO: 0.04 10*3/MM3 (ref 0–0.2)
BASOPHILS NFR BLD AUTO: 0.7 % (ref 0–1.5)
BILIRUB SERPL-MCNC: 0.4 MG/DL (ref 0–1.2)
BUN SERPL-MCNC: 11 MG/DL (ref 8–23)
BUN/CREAT SERPL: 12.1 (ref 7–25)
CALCIUM SERPL-MCNC: 9.6 MG/DL (ref 8.6–10.5)
CHLORIDE SERPL-SCNC: 100 MMOL/L (ref 98–107)
CHOLEST SERPL-MCNC: 157 MG/DL (ref 0–200)
CHOLEST/HDLC SERPL: 2.38 {RATIO}
CO2 SERPL-SCNC: 29.6 MMOL/L (ref 22–29)
CREAT SERPL-MCNC: 0.91 MG/DL (ref 0.57–1)
EOSINOPHIL # BLD AUTO: 0.29 10*3/MM3 (ref 0–0.4)
EOSINOPHIL NFR BLD AUTO: 5.1 % (ref 0.3–6.2)
ERYTHROCYTE [DISTWIDTH] IN BLOOD BY AUTOMATED COUNT: 11.7 % (ref 12.3–15.4)
GLOBULIN SER CALC-MCNC: 2 GM/DL
GLUCOSE SERPL-MCNC: 102 MG/DL (ref 65–99)
HBA1C MFR BLD: 5.9 % (ref 4.8–5.6)
HCT VFR BLD AUTO: 34.4 % (ref 34–46.6)
HDLC SERPL-MCNC: 66 MG/DL (ref 40–60)
HGB BLD-MCNC: 11.7 G/DL (ref 12–15.9)
IMM GRANULOCYTES # BLD AUTO: 0.01 10*3/MM3 (ref 0–0.05)
IMM GRANULOCYTES NFR BLD AUTO: 0.2 % (ref 0–0.5)
LDLC SERPL CALC-MCNC: 76 MG/DL (ref 0–100)
LYMPHOCYTES # BLD AUTO: 2.11 10*3/MM3 (ref 0.7–3.1)
LYMPHOCYTES NFR BLD AUTO: 37.4 % (ref 19.6–45.3)
MCH RBC QN AUTO: 31.9 PG (ref 26.6–33)
MCHC RBC AUTO-ENTMCNC: 34 G/DL (ref 31.5–35.7)
MCV RBC AUTO: 93.7 FL (ref 79–97)
MONOCYTES # BLD AUTO: 0.42 10*3/MM3 (ref 0.1–0.9)
MONOCYTES NFR BLD AUTO: 7.4 % (ref 5–12)
NEUTROPHILS # BLD AUTO: 2.77 10*3/MM3 (ref 1.7–7)
NEUTROPHILS NFR BLD AUTO: 49.2 % (ref 42.7–76)
NRBC BLD AUTO-RTO: 0 /100 WBC (ref 0–0.2)
PLATELET # BLD AUTO: 176 10*3/MM3 (ref 140–450)
POTASSIUM SERPL-SCNC: 4.2 MMOL/L (ref 3.5–5.2)
PROT SERPL-MCNC: 6 G/DL (ref 6–8.5)
RBC # BLD AUTO: 3.67 10*6/MM3 (ref 3.77–5.28)
SODIUM SERPL-SCNC: 138 MMOL/L (ref 136–145)
TRIGL SERPL-MCNC: 77 MG/DL (ref 0–150)
VLDLC SERPL CALC-MCNC: 15 MG/DL (ref 5–40)
WBC # BLD AUTO: 5.64 10*3/MM3 (ref 3.4–10.8)

## 2020-11-13 NOTE — PROGRESS NOTES
Please call daughter with lab results.  Complete blood count reveals borderline anemia.  For the last 1 year her hemoglobin and hematocrit have been trending downward.  I have added iron studies and B12 orders, please ask lab to add this to blood drawn yesterday.  Tell daughter we will call her with these results next week.  A1c is 5.9 consistent with average blood sugar of 120.  Kidney and liver function are normal.  Her cholesterol panel is excellent.

## 2020-11-16 ENCOUNTER — PATIENT OUTREACH (OUTPATIENT)
Dept: CASE MANAGEMENT | Facility: OTHER | Age: 70
End: 2020-11-16

## 2020-11-16 NOTE — OUTREACH NOTE
"Patient Outreach Note    SW reached out to pt's daughter Arianna to follow up. VIKTOR asked Arianna how pt was doing. Arianna stated, \" We took mom to the [facility] on Monday and the APRN prescribed Wellbutrin for depression and by Wednesday we could already tell a difference in her mood. She has a better attitude and no longer has that lost look in her eyes\". Arianna stated, \" I will still keep the resource numbers that you gave me bc we still may need those\". IVKTOR asked if pt needed anything else at this time and Arianna stated, \"No I think we are ok now, but thank you for calling to check in\".     CADEN Burnham  Ambulatory     11/16/2020, 09:07 EST      "

## 2020-11-16 NOTE — PROGRESS NOTES
Please tell daughter that iron studies and vitamin B12 level are normal.  She has had a slow drop in her hemoglobin and hematocrit over the last 1 year.  I suspect this may be due to her poor nutrition.  She is borderline anemic but not anemic at this time.  Please tell them to encourage healthy food intake and avoidance of junk food and this will likely prevent progression to anemia.

## 2020-11-17 LAB — Lab: NORMAL

## 2020-11-21 LAB
FERRITIN SERPL-MCNC: 188 NG/ML (ref 13–150)
IRON SATN MFR SERPL: 20 % (ref 20–50)
IRON SERPL-MCNC: 74 MCG/DL (ref 37–145)
Lab: NORMAL
Lab: NORMAL
METHYLMALONATE SERPL-SCNC: 350 NMOL/L (ref 0–378)
TIBC SERPL-MCNC: 368 MCG/DL
UIBC SERPL-MCNC: 294 MCG/DL (ref 112–346)
VIT B12 SERPL-MCNC: 1215 PG/ML (ref 211–946)
WRITTEN AUTHORIZATION: NORMAL

## 2020-11-23 ENCOUNTER — PATIENT OUTREACH (OUTPATIENT)
Dept: CASE MANAGEMENT | Facility: OTHER | Age: 70
End: 2020-11-23
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
## 2020-12-14 RX ORDER — HYDROCHLOROTHIAZIDE 12.5 MG/1
TABLET ORAL
Qty: 90 TABLET | Refills: 1 | Status: SHIPPED | OUTPATIENT
Start: 2020-12-14 | End: 2021-03-11 | Stop reason: SDUPTHER

## 2020-12-17 ENCOUNTER — PATIENT OUTREACH (OUTPATIENT)
Dept: CASE MANAGEMENT | Facility: OTHER | Age: 70
End: 2020-12-17

## 2020-12-17 NOTE — OUTREACH NOTE
"Patient Outreach Note    SW reached out to Arianna to see how pt was doing. Arianna stated, \" Pt is doing about the same. She has her good days and bad. Yesterday was a good day for her temperament but bad for her confusion. I plan to spend more time with her now that I am on [place] vacation til Jan 4. So I think me being more available to her will help. VIKTOR asked Arianna if she needed resources right now. Arianna stated, \" No I think we are ok, but thank you for calling\".     CADEN Burnham  Ambulatory     12/17/2020, 10:48 EST      "

## 2020-12-21 RX ORDER — AMLODIPINE BESYLATE 10 MG/1
TABLET ORAL
Qty: 90 TABLET | Refills: 1 | Status: SHIPPED | OUTPATIENT
Start: 2020-12-21 | End: 2021-03-11 | Stop reason: SDUPTHER

## 2021-02-08 RX ORDER — MONTELUKAST SODIUM 10 MG/1
TABLET ORAL
Qty: 90 TABLET | Refills: 3 | Status: SHIPPED | OUTPATIENT
Start: 2021-02-08 | End: 2021-09-23

## 2021-02-08 RX ORDER — PANTOPRAZOLE SODIUM 40 MG/1
TABLET, DELAYED RELEASE ORAL
Qty: 90 TABLET | Refills: 3 | Status: SHIPPED | OUTPATIENT
Start: 2021-02-08 | End: 2022-01-12 | Stop reason: SDUPTHER

## 2021-02-11 ENCOUNTER — TELEPHONE (OUTPATIENT)
Dept: CASE MANAGEMENT | Facility: OTHER | Age: 71
End: 2021-02-11

## 2021-02-11 NOTE — TELEPHONE ENCOUNTER
SW attempted to reach pt's Rama SELF. No answer. LVM    SW also attempted to reach Arianna, pt's relative. No answer. LVM

## 2021-02-19 ENCOUNTER — PATIENT OUTREACH (OUTPATIENT)
Dept: CASE MANAGEMENT | Facility: OTHER | Age: 71
End: 2021-02-19

## 2021-02-19 NOTE — OUTREACH NOTE
"Patient Outreach Note    VIKTOR received a call back from pt's daughter, Arianna. Arianna stated, \" Things here are a little worse with her condition. Mom is just not wanting to bathe and she sees a sink full of dishes but it doesn't click with her that she needs to do them\". VIKTOR asked Arianna if pt was eating much. Arianna stated, \"My sister goes over to visit her in the mornings and makes breakfast for her and then I go in the evenings to make dinner. And in the in between times we have to stock her house with non perishable items because she will get out some wilkerson and just leave it on the counter all day and it spoils. We mostly buy here some bread and peanut butter and some fruit\". Arianna asked VIKTOR 'if mom continues to get worse, we may need to think about a nursing home, but I will call you when that time comes\". VIKTOR told Arianna that would be fine just to call and keep VIKTOR updated. Arianna stated, \" Ok, thank you so much\".     CADEN Burnham  Ambulatory     2/19/2021, 11:32 EST      "

## 2021-02-24 RX ORDER — FUROSEMIDE 20 MG/1
TABLET ORAL
Qty: 36 TABLET | Refills: 3 | Status: SHIPPED | OUTPATIENT
Start: 2021-02-24 | End: 2022-01-24

## 2021-03-05 ENCOUNTER — PATIENT OUTREACH (OUTPATIENT)
Dept: CASE MANAGEMENT | Facility: OTHER | Age: 71
End: 2021-03-05

## 2021-03-05 NOTE — OUTREACH NOTE
"Patient Outreach Note    SW reached out to pt's sister Arianna. Arianna stated, \" Things here are about the same. Good days and some bad\". VIKTOR asked Arianna if there was anything that the pt needed any resources? Arianna stated, \" No, I think we are ok for now. We have an upcoming appt next week with Dr. Penny so we will be there for that, but if something comes up we will call you. Thank you for calling\".     CADEN Burnham  Ambulatory     3/5/2021, 14:18 EST      "

## 2021-03-11 ENCOUNTER — OFFICE VISIT (OUTPATIENT)
Dept: INTERNAL MEDICINE | Facility: CLINIC | Age: 71
End: 2021-03-11

## 2021-03-11 VITALS
HEART RATE: 61 BPM | HEIGHT: 68 IN | BODY MASS INDEX: 29.7 KG/M2 | OXYGEN SATURATION: 96 % | TEMPERATURE: 97 F | WEIGHT: 196 LBS | DIASTOLIC BLOOD PRESSURE: 62 MMHG | SYSTOLIC BLOOD PRESSURE: 120 MMHG

## 2021-03-11 DIAGNOSIS — F32.0 CURRENT MILD EPISODE OF MAJOR DEPRESSIVE DISORDER WITHOUT PRIOR EPISODE (HCC): ICD-10-CM

## 2021-03-11 DIAGNOSIS — E78.2 MIXED HYPERLIPIDEMIA: ICD-10-CM

## 2021-03-11 DIAGNOSIS — J43.8 OTHER EMPHYSEMA (HCC): ICD-10-CM

## 2021-03-11 DIAGNOSIS — I10 ESSENTIAL HYPERTENSION: Primary | ICD-10-CM

## 2021-03-11 DIAGNOSIS — J30.1 SEASONAL ALLERGIC RHINITIS DUE TO POLLEN: ICD-10-CM

## 2021-03-11 DIAGNOSIS — K21.9 GASTROESOPHAGEAL REFLUX DISEASE WITHOUT ESOPHAGITIS: ICD-10-CM

## 2021-03-11 DIAGNOSIS — R73.9 HYPERGLYCEMIA: ICD-10-CM

## 2021-03-11 DIAGNOSIS — I48.0 PAROXYSMAL ATRIAL FIBRILLATION (HCC): ICD-10-CM

## 2021-03-11 DIAGNOSIS — F03.90 DEMENTIA WITHOUT BEHAVIORAL DISTURBANCE, UNSPECIFIED DEMENTIA TYPE: ICD-10-CM

## 2021-03-11 PROCEDURE — 99214 OFFICE O/P EST MOD 30 MIN: CPT | Performed by: INTERNAL MEDICINE

## 2021-03-11 RX ORDER — AMLODIPINE BESYLATE 10 MG/1
10 TABLET ORAL DAILY
Qty: 90 TABLET | Refills: 1 | Status: SHIPPED | OUTPATIENT
Start: 2021-03-11 | End: 2021-09-23 | Stop reason: SDUPTHER

## 2021-03-11 RX ORDER — HYDROCHLOROTHIAZIDE 12.5 MG/1
12.5 TABLET ORAL DAILY
Qty: 90 TABLET | Refills: 1 | Status: SHIPPED | OUTPATIENT
Start: 2021-03-11 | End: 2021-06-21

## 2021-03-11 NOTE — PROGRESS NOTES
Chief Complaint   Patient presents with   • Follow-up     for anxiety, depression, GERD, HLD, and HTN. Pt states her nose is constantly running and has a headache daily.      Subjective   Novel Azael Garcia is a 70 y.o. female.     Here today for follow up of HTN, HLD, Afib, CAD, carotid disease, COPD, PAT, GERD, dementia, anx/depression, RLS.   HTN/HLD/Afib/CAD- her BP and heart rate are well controlled today.  She denies any CP, SOA, palpitations.  No edema.  No changes in any medications per cardiologist.   COPD/PAT- She is using oxygen at night, she is not using any inhalers.  She denies wheezing or any inhaler use.    GERD- she remains on protonix daily  Dementia- she remains on aricept and namenda, but memory is slowly declining.    Anx/depr-she is currently on Lexapro and wellbutrin.  Pt denies anxiety and depression but her daughter feels her mood is up and down-but has been more stable since addition of wellbutrin. She does sleep well at night. She is no longer cooking for herself, her daughters are bringing her all meals.    RLS- she denies any RLS sxs. She does have an occasional tremor.    HCM- Mammogram is past due since April 2019.  Colonoscopy completed 2017.  DEXA is UTD. Pneumoccal vaccines are UTD.  She is signed up for COVID vaccine.   She is complaining of RN and HA. She has been having some sinus pressure and allergies.  Her hearing has been less lately.  No ST or EA.         The following portions of the patient's history were reviewed and updated as appropriate: allergies, current medications, past family history, past medical history, past social history, past surgical history and problem list.    Review of Systems   Constitutional: Negative for activity change, appetite change and unexpected weight change.   HENT: Positive for rhinorrhea. Negative for congestion, ear pain and sore throat.    Eyes: Negative for visual disturbance.   Respiratory: Negative for shortness of breath.   "  Cardiovascular: Positive for leg swelling. Negative for chest pain and palpitations.   Gastrointestinal: Negative for abdominal pain and constipation.   Neurological: Positive for headaches.   Psychiatric/Behavioral: Positive for confusion and dysphoric mood. Negative for sleep disturbance. The patient is not nervous/anxious.        Objective   /62   Pulse 61   Temp 97 °F (36.1 °C)   Ht 172.7 cm (68\")   Wt 88.9 kg (196 lb)   SpO2 96%   BMI 29.80 kg/m²   Body mass index is 29.8 kg/m².  Physical Exam  Vitals and nursing note reviewed.   Constitutional:       General: She is not in acute distress.     Appearance: Normal appearance. She is well-developed. She is not ill-appearing.      Comments: Pleasantly confused female, appears her age and in no distress today   HENT:      Head: Normocephalic and atraumatic.      Right Ear: Ear canal and external ear normal.      Left Ear: Ear canal and external ear normal.      Ears:      Comments: Tympanic membranes dull bilaterally, small perforation noted in left tympanic membrane that is chronic     Nose: Rhinorrhea present.      Comments: Clear watery rhinorrhea noted  Eyes:      Conjunctiva/sclera: Conjunctivae normal.      Pupils: Pupils are equal, round, and reactive to light.   Neck:      Thyroid: No thyromegaly.   Cardiovascular:      Rate and Rhythm: Normal rate and regular rhythm.      Pulses: Normal pulses.      Heart sounds: Normal heart sounds. No murmur.      Comments: No carotid bruits  Pulmonary:      Effort: Pulmonary effort is normal. No respiratory distress.      Breath sounds: Normal breath sounds. No wheezing.   Abdominal:      General: Bowel sounds are normal. There is no distension.      Palpations: Abdomen is soft.      Tenderness: There is no abdominal tenderness.   Musculoskeletal:         General: Normal range of motion.      Cervical back: Normal range of motion and neck supple.      Right lower leg: Edema present.      Left lower leg: " Edema present.      Comments: Trace edema at ankles   Lymphadenopathy:      Cervical: No cervical adenopathy.   Neurological:      General: No focal deficit present.      Mental Status: She is alert.      Cranial Nerves: No cranial nerve deficit.      Coordination: Coordination normal.   Psychiatric:         Behavior: Behavior normal.      Comments: Flat affect, pleasantly confused, daughter answers majority of questions as patient cannot answer correctly         Assessment/Plan   Nery Garcia is here today and the following problems have been addressed:      Diagnoses and all orders for this visit:    1. Essential hypertension (Primary)    2. Mixed hyperlipidemia    3. Paroxysmal atrial fibrillation (CMS/HCC)    4. Hyperglycemia    5. Gastroesophageal reflux disease without esophagitis    6. Current mild episode of major depressive disorder without prior episode (CMS/HCC)    7. Dementia without behavioral disturbance, unspecified dementia type (CMS/HCC)    8. Other emphysema (CMS/HCC)    9. Seasonal allergic rhinitis due to pollen    Other orders  -     amLODIPine (NORVASC) 10 MG tablet; Take 1 tablet by mouth Daily.  Dispense: 90 tablet; Refill: 1  -     hydroCHLOROthiazide (HYDRODIURIL) 12.5 MG tablet; Take 1 tablet by mouth Daily.  Dispense: 90 tablet; Refill: 1        Follow heart healthy/low salt diet  Monitor blood pressure on occasion  Exercise as tolerated   Take all medications as prescribed  Recommend resume flonase and start zyrtec every PM also  Continue Aricept and Namenda for underlying dementia, family members are helping patient at home in regard to meals and ADLs  Continue atorvastatin for hyperlipidemia  Her depression is stable with use of Wellbutrin and Lexapro per daughter  Patient does not require any inhalers for underlying COPD, she does use oxygen at night  Heart rate is currently well controlled, continue Eliquis and sotalol for underlying atrial fibrillation  GERD is well  controlled with Protonix    Return in about 4 months (around 7/11/2021) for Medicare Wellness.      Marilyn K. Vermeesch, MD      Please note that portions of this note were completed with a voice recognition program.  Efforts were made to edit dictation, but occasionally words are mistranscribed.

## 2021-03-29 RX ORDER — ATORVASTATIN CALCIUM 40 MG/1
TABLET, FILM COATED ORAL
Qty: 90 TABLET | Refills: 1 | Status: SHIPPED | OUTPATIENT
Start: 2021-03-29 | End: 2021-09-23 | Stop reason: SDUPTHER

## 2021-06-09 ENCOUNTER — OFFICE VISIT (OUTPATIENT)
Dept: INTERNAL MEDICINE | Facility: CLINIC | Age: 71
End: 2021-06-09

## 2021-06-09 VITALS
SYSTOLIC BLOOD PRESSURE: 142 MMHG | BODY MASS INDEX: 30.77 KG/M2 | OXYGEN SATURATION: 99 % | HEIGHT: 68 IN | TEMPERATURE: 97.3 F | WEIGHT: 203 LBS | HEART RATE: 73 BPM | DIASTOLIC BLOOD PRESSURE: 66 MMHG

## 2021-06-09 DIAGNOSIS — H66.016 RECURRENT ACUTE SUPPURATIVE OTITIS MEDIA WITH SPONTANEOUS RUPTURE OF BOTH TYMPANIC MEMBRANES: Primary | ICD-10-CM

## 2021-06-09 PROCEDURE — 99213 OFFICE O/P EST LOW 20 MIN: CPT | Performed by: INTERNAL MEDICINE

## 2021-06-09 RX ORDER — AMOXICILLIN AND CLAVULANATE POTASSIUM 875; 125 MG/1; MG/1
1 TABLET, FILM COATED ORAL EVERY 12 HOURS SCHEDULED
Qty: 14 TABLET | Refills: 0 | Status: SHIPPED | OUTPATIENT
Start: 2021-06-09 | End: 2021-07-12

## 2021-06-09 NOTE — PROGRESS NOTES
Chief Complaint   Patient presents with   • Headache     for the past couple of weeks   • Earache     and tenderness behind the ears this morning, maybe has a cyst or knot behind them       Subjective     History of Present Illness   Nery aGrcia is a 70 y.o. female presenting for follow up.  2 weeks headaches, this Am noticce knots behind both ears. Has a known perforation in the right ear.  Has had issues with ear infections in the past.  Patient was shivering and feeling cold during exam today and blanket had to be brought in to keep her warm.      The following portions of the patient's history were reviewed and updated as appropriate: allergies, current medications, past family history, past medical history, past social history, past surgical history and problem list.    Review of Systems   Constitutional: Negative for chills, fatigue and fever.   HENT: Positive for ear pain. Negative for congestion, rhinorrhea, sinus pressure and sore throat.    Eyes: Negative for visual disturbance.   Respiratory: Negative for cough, chest tightness, shortness of breath and wheezing.    Cardiovascular: Negative for chest pain, palpitations and leg swelling.   Gastrointestinal: Negative for abdominal pain, blood in stool, constipation, diarrhea, nausea and vomiting.   Endocrine: Negative for polydipsia and polyuria.   Genitourinary: Negative for dysuria and hematuria.   Musculoskeletal: Negative for arthralgias and back pain.   Skin: Negative for rash.   Neurological: Positive for headaches. Negative for dizziness, light-headedness and numbness.   Psychiatric/Behavioral: Negative for dysphoric mood and sleep disturbance. The patient is not nervous/anxious.        Allergies   Allergen Reactions   • Lisinopril Cough       Past Medical History:   Diagnosis Date   • Acute bronchitis    • Acute otitis media    • Anxiety    • Atrial fibrillation (CMS/HCC)    • Body piercing     EARS   • CAD (coronary artery disease)    • CAD  (coronary artery disease)    • Cardiac insufficiency (CMS/HCC)    • Chest pain    • Colitis    • COPD (chronic obstructive pulmonary disease) (CMS/HCC)    • Dementia (CMS/HCC)     PATIENT REPORTS VERY EARLY STAGES OF THIS.  REPORTS CURRENTLY LIVES BY HERSELF.   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 BUT DOES NOT WEAR THEM ANYMORE   • Hepatitis     REPORTS AS A CHILD, UNSURE TYPE   • History of esophageal reflux    • History of obesity    • Hyperlipidemia    • Hypertension    • Iron deficiency anemia    • Obesity      TRUNKAL   • On home oxygen therapy     REPORTS SHE WEARS AT 1L [facility] HS   • Otitis externa    • Peptic ulcer disease     noted on EGD, 2013   • Pneumonia    • Pulmonary embolus (CMS/HCC) 2012   • RLS (restless legs syndrome)    • Wears glasses    • Wears partial dentures     FULL UPPER PLATE AND PARTIAL LOWER PLATE       Social History     Socioeconomic History   • Marital status:      Spouse name: Not on file   • Number of children: Not on file   • Years of education: Not on file   • Highest education level: Not on file   Tobacco Use   • Smoking status: Former Smoker     Packs/day: 3.00     Years: 35.00     Pack years: 105.00     Types: Cigarettes     Quit date:      Years since quittin.4   • Smokeless tobacco: Never Used   Substance and Sexual Activity   • Alcohol use: Yes     Comment: REPORTS HAS SEVERAL MIXED DRINKS WEEKLY   • Drug use: No   • Sexual activity: Defer        Past Surgical History:   Procedure Laterality Date   • BREAST EXCISIONAL BIOPSY Bilateral     Patient unsure of dates-    • BREAST SURGERY Bilateral     lumpectomies, PATIENT REPORTS BENIGN AND THAT THERE ARE NO RESTRICTIONS ON BUE'S   • CATARACT EXTRACTION Bilateral    • COLONOSCOPY N/A 2017    Procedure: COLONOSCOPY WITH COLD BIOPSY  POLYPECTOMY; BIOPSIES;  Surgeon: Severiano Calloway MD;  Location: [facility] ENDOSCOPY;  Service:    • CORONARY ANGIOPLASTY  10/01/2012    Nonobstructive coronary artery disease with normal left ventricular function    • HYSTERECTOMY     • OOPHORECTOMY         Family History   Problem Relation Age of Onset   • Heart attack Mother    • Stroke Mother    • Heart attack Father    • Stroke Father    • Heart attack Brother    • Breast cancer Neg Hx    • Ovarian cancer Neg Hx    • Colon cancer Neg Hx          Current Outpatient Medications:   •  amLODIPine (NORVASC) 10 MG tablet, Take 1 tablet by mouth Daily., Disp: 90 tablet, Rfl: 1  •  apixaban (ELIQUIS) 5 MG tablet tablet, Take 5 mg by mouth 2 (Two) Times a Day., Disp: , Rfl:   •  aspirin 81 MG tablet, Take 81 mg by mouth Daily., Disp: , Rfl:   •  atorvastatin (LIPITOR) 40 MG tablet, Take 1 tablet by mouth once daily, Disp: 90 tablet, Rfl: 1  •  buPROPion XL (Wellbutrin XL) 150 MG 24 hr tablet, Take 1 tablet by mouth Daily., Disp: , Rfl:   •  calcium-vitamin D (OSCAL 500/200 D-3) 500-200 MG-UNIT per tablet, Take 1 tablet by mouth Daily., Disp: , Rfl:   •  donepezil (ARICEPT) 10 MG tablet, Take 1 tablet by mouth every night., Disp: , Rfl:   •  escitalopram (LEXAPRO) 20 MG tablet, Take 20 mg by mouth Daily., Disp: , Rfl:   •  fluticasone (FLONASE) 50 MCG/ACT nasal spray, 2 sprays into each nostril Daily As Needed for Rhinitis or Allergies., Disp: , Rfl:   •  furosemide (LASIX) 20 MG tablet, TAKE 1 TABLET BY MOUTH ON MONDAY, WEDNESDAY AND FRIDAY, Disp: 36 tablet, Rfl: 3  •  hydroCHLOROthiazide (HYDRODIURIL) 12.5 MG tablet, Take 1 tablet by mouth Daily., Disp: 90 tablet, Rfl: 1  •  losartan (COZAAR) 100 MG tablet, Take 1 tablet by mouth once daily, Disp: 90 tablet, Rfl: 3  •  memantine (NAMENDA) 5 MG tablet, Take 10 mg by mouth 2 (Two) Times a Day., Disp: , Rfl:   •  montelukast (SINGULAIR) 10 MG tablet, Take 1 tablet by mouth once daily, Disp: 90 tablet, Rfl: 3  •  Multiple  "Vitamin (MULTI-DAY VITAMINS) tablet, Take 1 tablet by mouth daily., Disp: , Rfl:   •  O2 (OXYGEN), Inhale 1 L/min Every Night., Disp: , Rfl:   •  pantoprazole (PROTONIX) 40 MG EC tablet, Take 1 tablet by mouth once daily, Disp: 90 tablet, Rfl: 3  •  sotalol (BETAPACE) 80 MG tablet, Take 1/2 (one-half) tablet by mouth twice daily, Disp: 90 tablet, Rfl: 3  •  vitamin B-12 (CYANOCOBALAMIN) 1000 MCG tablet, Take 500 mcg by mouth Daily., Disp: , Rfl:   •  amoxicillin-clavulanate (Augmentin) 875-125 MG per tablet, Take 1 tablet by mouth Every 12 (Twelve) Hours., Disp: 14 tablet, Rfl: 0    Objective   /66   Pulse 73   Temp 97.3 °F (36.3 °C)   Ht 172.7 cm (68\")   Wt 92.1 kg (203 lb)   SpO2 99%   BMI 30.87 kg/m²     Physical Exam  Vitals and nursing note reviewed.   Constitutional:       Appearance: She is well-developed.   HENT:      Head: Normocephalic and atraumatic.      Ears:      Comments: Right TM with chronic perforation but with diffuse redness, pale TM on left     Nose: Nose normal. No congestion.      Mouth/Throat:      Pharynx: No oropharyngeal exudate.   Eyes:      Conjunctiva/sclera: Conjunctivae normal.   Cardiovascular:      Rate and Rhythm: Normal rate and regular rhythm.      Heart sounds: Normal heart sounds.   Pulmonary:      Effort: Pulmonary effort is normal. No respiratory distress.      Breath sounds: Normal breath sounds. No wheezing.   Musculoskeletal:      Cervical back: Normal range of motion and neck supple.   Lymphadenopathy:      Cervical: Cervical adenopathy present.   Skin:     Findings: No rash.   Neurological:      Mental Status: She is alert and oriented to person, place, and time.   Psychiatric:         Mood and Affect: Mood normal.         Behavior: Behavior normal.         Assessment/Plan   Diagnoses and all orders for this visit:    1. Recurrent acute suppurative otitis media with spontaneous rupture of both tympanic membranes (Primary)  -     amoxicillin-clavulanate " (Augmentin) 875-125 MG per tablet; Take 1 tablet by mouth Every 12 (Twelve) Hours.  Dispense: 14 tablet; Refill: 0          Discussion Summary:  Patient is a 70 y.o. female presenting for follow up with Otitis media.  Start augmentin.  Patient appears to have significant disease.  Family was made aware to monitor symptoms and contact clinic or present to ER should her symptoms worsen.         Follow up:  No follow-ups on file.

## 2021-06-21 RX ORDER — HYDROCHLOROTHIAZIDE 12.5 MG/1
TABLET ORAL
Qty: 90 TABLET | Refills: 1 | Status: SHIPPED | OUTPATIENT
Start: 2021-06-21 | End: 2022-01-12 | Stop reason: SDUPTHER

## 2021-07-12 ENCOUNTER — OFFICE VISIT (OUTPATIENT)
Dept: INTERNAL MEDICINE | Facility: CLINIC | Age: 71
End: 2021-07-12

## 2021-07-12 VITALS
TEMPERATURE: 97 F | WEIGHT: 201 LBS | OXYGEN SATURATION: 95 % | SYSTOLIC BLOOD PRESSURE: 128 MMHG | HEART RATE: 55 BPM | HEIGHT: 68 IN | BODY MASS INDEX: 30.46 KG/M2 | DIASTOLIC BLOOD PRESSURE: 60 MMHG

## 2021-07-12 DIAGNOSIS — F03.90 DEMENTIA WITHOUT BEHAVIORAL DISTURBANCE, UNSPECIFIED DEMENTIA TYPE: ICD-10-CM

## 2021-07-12 DIAGNOSIS — F32.0 CURRENT MILD EPISODE OF MAJOR DEPRESSIVE DISORDER WITHOUT PRIOR EPISODE (HCC): ICD-10-CM

## 2021-07-12 DIAGNOSIS — E78.2 MIXED HYPERLIPIDEMIA: ICD-10-CM

## 2021-07-12 DIAGNOSIS — I10 ESSENTIAL HYPERTENSION: ICD-10-CM

## 2021-07-12 DIAGNOSIS — K21.9 GASTROESOPHAGEAL REFLUX DISEASE WITHOUT ESOPHAGITIS: ICD-10-CM

## 2021-07-12 DIAGNOSIS — R73.9 HYPERGLYCEMIA: ICD-10-CM

## 2021-07-12 DIAGNOSIS — J43.8 OTHER EMPHYSEMA (HCC): ICD-10-CM

## 2021-07-12 DIAGNOSIS — G47.33 OBSTRUCTIVE SLEEP APNEA SYNDROME: ICD-10-CM

## 2021-07-12 DIAGNOSIS — Z12.31 SCREENING MAMMOGRAM, ENCOUNTER FOR: ICD-10-CM

## 2021-07-12 DIAGNOSIS — E66.09 CLASS 1 OBESITY DUE TO EXCESS CALORIES WITH SERIOUS COMORBIDITY AND BODY MASS INDEX (BMI) OF 30.0 TO 30.9 IN ADULT: ICD-10-CM

## 2021-07-12 DIAGNOSIS — F41.9 ANXIETY: ICD-10-CM

## 2021-07-12 DIAGNOSIS — Z11.59 ENCOUNTER FOR HEPATITIS C SCREENING TEST FOR LOW RISK PATIENT: ICD-10-CM

## 2021-07-12 DIAGNOSIS — Z00.00 ENCOUNTER FOR MEDICARE ANNUAL WELLNESS EXAM: Primary | ICD-10-CM

## 2021-07-12 DIAGNOSIS — I48.0 PAROXYSMAL ATRIAL FIBRILLATION (HCC): ICD-10-CM

## 2021-07-12 PROBLEM — E66.811 CLASS 1 OBESITY DUE TO EXCESS CALORIES WITH SERIOUS COMORBIDITY AND BODY MASS INDEX (BMI) OF 30.0 TO 30.9 IN ADULT: Status: ACTIVE | Noted: 2021-07-12

## 2021-07-12 PROCEDURE — G0439 PPPS, SUBSEQ VISIT: HCPCS | Performed by: INTERNAL MEDICINE

## 2021-07-12 NOTE — PROGRESS NOTES
The ABCs of the Annual Wellness Visit  Subsequent Medicare Wellness Visit    Chief Complaint   Patient presents with   • Medicare Wellness-subsequent       Subjective   History of Present Illness:  Nery Garcia is a 70 y.o. female who presents for a Subsequent Medicare Wellness Visit.  PMH of HTN, HLD, A. fib, CAD, carotid artery disease, hyperglycemia, GERD, anxiety/depression, dementia, COPD, PAT.   Her BP and HR are well controlled, she denies any CP or SOA.  She eats a HH diet.  She does not eat much junk foot or sweets.  She does not walk much.  She has been sleeping well.  Her mood goes up and down.  Her daughters visit her every day.  Both daughters share her medications  responsibilities morning and night.  She remains on oxygen at night for her PAT and feels that this is helpful and keeps her comfortable for sleep.    HEALTH RISK ASSESSMENT    Recent Hospitalizations:  No hospitalization(s) within the last year.    Current Medical Providers:  Patient Care Team:  Vermeesch, Marilyn K, MD as PCP - General (Internal Medicine & Pediatrics)    Smoking Status:  Social History     Tobacco Use   Smoking Status Former Smoker   • Packs/day: 3.00   • Years: 35.00   • Pack years: 105.00   • Types: Cigarettes   • Quit date:    • Years since quittin.5   Smokeless Tobacco Never Used       Alcohol Consumption:  Social History     Substance and Sexual Activity   Alcohol Use Yes    Comment: REPORTS HAS SEVERAL MIXED DRINKS WEEKLY       Depression Screen:   PHQ-2/PHQ-9 Depression Screening 2021   Little interest or pleasure in doing things 0   Feeling down, depressed, or hopeless 1   Total Score 1       Fall Risk Screen:  STEADI Fall Risk Assessment was completed, and patient is at MODERATE risk for falls. Assessment completed on:2021    Health Habits and Functional and Cognitive Screening:  Functional & Cognitive Status 2021   Do you have difficulty preparing food and eating? Yes   Do you have  difficulty bathing yourself, getting dressed or grooming yourself? No   Do you have difficulty using the toilet? No   Do you have difficulty moving around from place to place? No   Do you have trouble with steps or getting out of a bed or a chair? No   Current Diet Well Balanced Diet   Dental Exam Not up to date   Eye Exam Up to date   Exercise (times per week) 0 times per week   Current Exercises Include No Regular Exercise   Current Exercise Activities Include -   Do you need help using the phone?  No   Are you deaf or do you have serious difficulty hearing?  No   Do you need help with transportation? Yes   Do you need help shopping? Yes   Do you need help preparing meals?  Yes   Do you need help with housework?  Yes   Do you need help with laundry? Yes   Do you need help taking your medications? Yes   Do you need help managing money? Yes   Do you ever drive or ride in a car without wearing a seat belt? No   Have you felt unusual stress, anger or loneliness in the last month? Yes   Who do you live with? Alone   If you need help, do you have trouble finding someone available to you? No   Have you been bothered in the last four weeks by sexual problems? No   Do you have difficulty concentrating, remembering or making decisions? Yes         Does the patient have evidence of cognitive impairment? Yes    Asprin use counseling:Taking ASA appropriately as indicated    Age-appropriate Screening Schedule:  Refer to the list below for future screening recommendations based on patient's age, sex and/or medical conditions. Orders for these recommended tests are listed in the plan section. The patient has been provided with a written plan.    Health Maintenance   Topic Date Due   • ZOSTER VACCINE (1 of 2) Never done   • DXA SCAN  07/26/2020   • MAMMOGRAM  04/15/2021   • INFLUENZA VACCINE  08/01/2021   • LIPID PANEL  11/12/2021   • PAP SMEAR  Discontinued   • TDAP/TD VACCINES  Discontinued          The following portions of the  patient's history were reviewed and updated as appropriate: allergies, current medications, past family history, past medical history, past social history, past surgical history and problem list.    Outpatient Medications Prior to Visit   Medication Sig Dispense Refill   • Cetirizine HCl (ZYRTEC PO) Take  by mouth.     • amLODIPine (NORVASC) 10 MG tablet Take 1 tablet by mouth Daily. 90 tablet 1   • apixaban (ELIQUIS) 5 MG tablet tablet Take 5 mg by mouth 2 (Two) Times a Day.     • aspirin 81 MG tablet Take 81 mg by mouth Daily.     • atorvastatin (LIPITOR) 40 MG tablet Take 1 tablet by mouth once daily 90 tablet 1   • buPROPion XL (Wellbutrin XL) 150 MG 24 hr tablet Take 1 tablet by mouth Daily.     • calcium-vitamin D (OSCAL 500/200 D-3) 500-200 MG-UNIT per tablet Take 1 tablet by mouth Daily.     • donepezil (ARICEPT) 10 MG tablet Take 1 tablet by mouth every night.     • escitalopram (LEXAPRO) 20 MG tablet Take 20 mg by mouth Daily.     • fluticasone (FLONASE) 50 MCG/ACT nasal spray 2 sprays into each nostril Daily As Needed for Rhinitis or Allergies.     • furosemide (LASIX) 20 MG tablet TAKE 1 TABLET BY MOUTH ON MONDAY, WEDNESDAY AND FRIDAY 36 tablet 3   • hydroCHLOROthiazide (HYDRODIURIL) 12.5 MG tablet Take 1 tablet by mouth once daily 90 tablet 1   • losartan (COZAAR) 100 MG tablet Take 1 tablet by mouth once daily 90 tablet 3   • memantine (NAMENDA) 5 MG tablet Take 10 mg by mouth 2 (Two) Times a Day.     • montelukast (SINGULAIR) 10 MG tablet Take 1 tablet by mouth once daily 90 tablet 3   • Multiple Vitamin (MULTI-DAY VITAMINS) tablet Take 1 tablet by mouth daily.     • O2 (OXYGEN) Inhale 1 L/min Every Night.     • pantoprazole (PROTONIX) 40 MG EC tablet Take 1 tablet by mouth once daily 90 tablet 3   • sotalol (BETAPACE) 80 MG tablet Take 1/2 (one-half) tablet by mouth twice daily 90 tablet 3   • vitamin B-12 (CYANOCOBALAMIN) 1000 MCG tablet Take 500 mcg by mouth Daily.     • amoxicillin-clavulanate  (Augmentin) 875-125 MG per tablet Take 1 tablet by mouth Every 12 (Twelve) Hours. 14 tablet 0     No facility-administered medications prior to visit.       Patient Active Problem List   Diagnosis   • Anxiety   • Dementia (CMS/HCC)   • Depression   • Hyperlipidemia   • Hypertension   • Obstructive sleep apnea syndrome   • Paroxysmal atrial fibrillation (CMS/HCC)   • Allergic rhinitis   • Chronic constipation   • CAD (coronary artery disease)   • COPD (chronic obstructive pulmonary disease) (CMS/HCC)   • GERD (gastroesophageal reflux disease)   • Carotid artery disease (CMS/[facility])   • Chronic fatigue   • Encounter for Medicare annual wellness exam   • Local edema   • Hyperglycemia   • Encounter for hepatitis C screening test for low risk patient   • Screening mammogram, encounter for   • Class 1 obesity due to excess calories with serious comorbidity and body mass index (BMI) of 30.0 to 30.9 in adult       Advanced Care Planning:  ACP discussion was held with the patient during this visit. Patient has an advance directive in EMR which is still valid.     Review of Systems   Constitutional: Negative.    HENT: Positive for hearing loss and trouble swallowing.    Eyes: Negative.    Respiratory: Negative.    Cardiovascular: Negative.    Gastrointestinal: Negative.    Endocrine: Negative.    Genitourinary: Negative.    Musculoskeletal: Negative.    Skin: Negative.    Allergic/Immunologic: Positive for environmental allergies.   Neurological: Positive for dizziness and headaches.   Hematological: Bruises/bleeds easily.   Psychiatric/Behavioral: Positive for confusion and dysphoric mood.       Compared to one year ago, the patient feels her physical health is the same.  Compared to one year ago, the patient feels her mental health is the same.    Reviewed chart for potential of high risk medication in the elderly: yes  Reviewed chart for potential of harmful drug interactions in the elderly:yes    Objective         Vitals:     "07/12/21 0950   BP: 128/60   Pulse: 55   Temp: 97 °F (36.1 °C)   SpO2: 95%   Weight: 91.2 kg (201 lb)   Height: 172.7 cm (68\")   PainSc: 0-No pain       Body mass index is 30.56 kg/m².  Discussed the patient's BMI with her. The BMI is above average; BMI management plan is completed.    Physical Exam  Vitals and nursing note reviewed.   Constitutional:       General: She is not in acute distress.     Appearance: Normal appearance. She is well-developed. She is not ill-appearing.      Comments: Kind and pleasant female, appears stated age and in NAD today   HENT:      Head: Normocephalic and atraumatic.      Right Ear: Tympanic membrane, ear canal and external ear normal.      Left Ear: Tympanic membrane, ear canal and external ear normal.   Eyes:      General:         Right eye: No discharge.         Left eye: No discharge.      Extraocular Movements: Extraocular movements intact.      Conjunctiva/sclera: Conjunctivae normal.      Pupils: Pupils are equal, round, and reactive to light.   Neck:      Thyroid: No thyromegaly.      Vascular: Carotid bruit present.      Comments: No thyromegaly or mass  Left carotid bruit noted  Cardiovascular:      Rate and Rhythm: Normal rate and regular rhythm.      Pulses: Normal pulses.      Heart sounds: Normal heart sounds. No murmur heard.     Pulmonary:      Effort: Pulmonary effort is normal. No respiratory distress.      Breath sounds: Normal breath sounds. No wheezing.   Abdominal:      General: Bowel sounds are normal. There is no distension.      Palpations: Abdomen is soft.      Tenderness: There is no abdominal tenderness.   Musculoskeletal:         General: Normal range of motion.      Cervical back: Normal range of motion and neck supple.      Right lower leg: No edema.      Left lower leg: No edema.   Lymphadenopathy:      Cervical: No cervical adenopathy.   Skin:     General: Skin is warm.      Findings: No rash.   Neurological:      General: No focal deficit present. "      Mental Status: She is alert and oriented to person, place, and time. Mental status is at baseline.      Cranial Nerves: No cranial nerve deficit.      Motor: No weakness.      Coordination: Coordination normal.      Gait: Gait normal.   Psychiatric:         Mood and Affect: Mood normal.         Behavior: Behavior normal.      Comments: Pleasantly confused, daughter answers many questions for her               Assessment/Plan   Medicare Risks and Personalized Health Plan  CMS Preventative Services Quick Reference  Breast Cancer/Mammogram Screening  Dementia/Memory   Diabetic Lab Screening   Immunizations Discussed/Encouraged (specific immunizations; Shingrix and COVID19 )  Inactivity/Sedentary  Obesity/Overweight     The above risks/problems have been discussed with the patient.  Pertinent information has been shared with the patient in the After Visit Summary.  Follow up plans and orders are seen below in the Assessment/Plan Section.    Diagnoses and all orders for this visit:    1. Encounter for Medicare annual wellness exam (Primary)  -     Comprehensive Metabolic Panel  -     Hemoglobin A1c  -     Hepatitis C Antibody    2. Essential hypertension  -     Comprehensive Metabolic Panel    3. Mixed hyperlipidemia    4. Paroxysmal atrial fibrillation (CMS/HCC)    5. Hyperglycemia  -     Comprehensive Metabolic Panel  -     Hemoglobin A1c    6. Gastroesophageal reflux disease without esophagitis    7. Current mild episode of major depressive disorder without prior episode (CMS/HCC)    8. Dementia without behavioral disturbance, unspecified dementia type (CMS/HCC)    9. Other emphysema (CMS/HCC)    10. Obstructive sleep apnea syndrome    11. Anxiety    12. Encounter for hepatitis C screening test for low risk patient  -     Hepatitis C Antibody    13. Screening mammogram, encounter for  -     Mammo Screening Digital Tomosynthesis Bilateral With CAD; Future    14. Class 1 obesity due to excess calories with serious  comorbidity and body mass index (BMI) of 30.0 to 30.9 in adult    Follow heart healthy/low salt diet  Avoid processed foods  Monitor blood pressure as discussed  Exercise as tolerated up to 30 minutes 5 days per week  Take all medications as prescribed  Labs as noted  Continue Eliquis, aspirin and sotalol, heart rate well controlled in regards to A. Fib  Continue Protonix, no complaints of GERD  Her mood fluctuates, but overall Wellbutrin is working well in conjunction with Lexapro for her depression  Continue Aricept and Namenda for underlying dementia  Her breathing is doing fairly well, she remains on Singulair at night and does not require any current inhalers  She remains on oxygen at night for underlying sleep apnea  Patient's Body mass index is 30.56 kg/m². indicating that she is obese (BMI >30). Obesity-related health conditions include the following: obstructive sleep apnea, hypertension, dyslipidemias and GERD. Obesity is unchanged. BMI is is above average; BMI management plan is completed. We discussed portion control and increasing exercise..      Follow Up:  Return in about 6 months (around 1/12/2022) for Next scheduled follow up.     An After Visit Summary and PPPS were given to the patient.

## 2021-07-13 LAB
ALBUMIN SERPL-MCNC: 4.4 G/DL (ref 3.5–5.2)
ALBUMIN/GLOB SERPL: 2.4 G/DL
ALP SERPL-CCNC: 62 U/L (ref 39–117)
ALT SERPL-CCNC: 19 U/L (ref 1–33)
AST SERPL-CCNC: 20 U/L (ref 1–32)
BILIRUB SERPL-MCNC: 0.4 MG/DL (ref 0–1.2)
BUN SERPL-MCNC: 17 MG/DL (ref 8–23)
BUN/CREAT SERPL: 16 (ref 7–25)
CALCIUM SERPL-MCNC: 9.8 MG/DL (ref 8.6–10.5)
CHLORIDE SERPL-SCNC: 103 MMOL/L (ref 98–107)
CO2 SERPL-SCNC: 28.5 MMOL/L (ref 22–29)
CREAT SERPL-MCNC: 1.06 MG/DL (ref 0.57–1)
GLOBULIN SER CALC-MCNC: 1.8 GM/DL
GLUCOSE SERPL-MCNC: 105 MG/DL (ref 65–99)
HBA1C MFR BLD: 5.8 % (ref 4.8–5.6)
HCV AB S/CO SERPL IA: <0.1 S/CO RATIO (ref 0–0.9)
POTASSIUM SERPL-SCNC: 4.5 MMOL/L (ref 3.5–5.2)
PROT SERPL-MCNC: 6.2 G/DL (ref 6–8.5)
SODIUM SERPL-SCNC: 141 MMOL/L (ref 136–145)

## 2021-08-30 ENCOUNTER — OFFICE VISIT (OUTPATIENT)
Dept: CARDIOLOGY | Facility: CLINIC | Age: 71
End: 2021-08-30

## 2021-08-30 VITALS
BODY MASS INDEX: 31.37 KG/M2 | WEIGHT: 207 LBS | HEIGHT: 68 IN | DIASTOLIC BLOOD PRESSURE: 58 MMHG | OXYGEN SATURATION: 98 % | SYSTOLIC BLOOD PRESSURE: 110 MMHG | HEART RATE: 51 BPM

## 2021-08-30 DIAGNOSIS — I48.0 PAROXYSMAL ATRIAL FIBRILLATION (HCC): Primary | ICD-10-CM

## 2021-08-30 DIAGNOSIS — E78.2 MIXED HYPERLIPIDEMIA: ICD-10-CM

## 2021-08-30 DIAGNOSIS — I10 ESSENTIAL HYPERTENSION: ICD-10-CM

## 2021-08-30 PROCEDURE — 93000 ELECTROCARDIOGRAM COMPLETE: CPT | Performed by: INTERNAL MEDICINE

## 2021-08-30 PROCEDURE — 99214 OFFICE O/P EST MOD 30 MIN: CPT | Performed by: INTERNAL MEDICINE

## 2021-08-30 NOTE — PROGRESS NOTES
[facility]  Office Progress Note  Nery Garcia  1950  [address] [place] [address]       Visit Date: 08/30/21    PCP: Vermeesch, Marilyn K, MD  [address]  [place] [address]    IDENTIFICATION: A 70 y.o. female  retired  from [employer]    PROBLEM LIST:   1. Carotid artery disease.  a. Carotid MRA, January 2012, revealing 50% to 75% right ICA stenosis.  b. April 2018, carotid duplex with 70% stenosis on the right, less than 50% on the left. Dr Jackson saldivar, pt deferred future fu   c. 4/19 CUS <70% bilat  d. 10/20 US carotids: Right ICA: 50-69%.  Left ICA: 0-49%.  2. Nonobstructive coronary artery disease with normal left ventricular function per cardiac catheterization in October 2012.  3. Atrial fibrillation.  a. Initial diagnosis in November 2010.  b. Previous Multaq therapy - currently on propafenone.  c. CHADS vasc 2   4. Pulmonary embolus, January 2012.  5. Hypertension - labile.  6. Dyslipidemia.  a. July 2014: Total cholesterol 195, triglycerides 110, HDL 64, .   b. 7/17/2019  TG 90 HDL 83 LDL 72  c. 11/20 , TG 77, HDL 66, LDL 76  7. Insulin resistance  1. 2/20 A1c 6.0  2. 11/20 A1c 5.9  3. 7/21 A1c 5.8  8. 'History of tobacco abuse with cessation in 2006.  9. Chronic obstructive pulmonary disease.  10. Gastroesophageal reflux disease.  11. Peptic ulcer disease noted on EGD, November 2013.  12. Restless leg syndrome.  13. Anxiety.  14. Fatty liver per evaluation, November 2013.  15. Dementia followed per Dr. Chandler at [facility]. 3 sisters with precocious dementia  16. Questionable sleep apnea.  17. Surgical history.  a. Hysterectomy.  b. Bilateral breast lumpectomies  c. Cataract extractions       CC:   Chief Complaint   Patient presents with   • Coronary Artery Disease       Allergies  Allergies   Allergen Reactions   • Lisinopril Cough       Current Medications    Current Outpatient  Medications:   •  amLODIPine (NORVASC) 10 MG tablet, Take 1 tablet by mouth Daily., Disp: 90 tablet, Rfl: 1  •  apixaban (ELIQUIS) 5 MG tablet tablet, Take 5 mg by mouth 2 (Two) Times a Day., Disp: , Rfl:   •  aspirin 81 MG tablet, Take 81 mg by mouth Daily., Disp: , Rfl:   •  atorvastatin (LIPITOR) 40 MG tablet, Take 1 tablet by mouth once daily, Disp: 90 tablet, Rfl: 1  •  buPROPion XL (Wellbutrin XL) 150 MG 24 hr tablet, Take 1 tablet by mouth Daily., Disp: , Rfl:   •  calcium-vitamin D (OSCAL 500/200 D-3) 500-200 MG-UNIT per tablet, Take 1 tablet by mouth Daily., Disp: , Rfl:   •  Cetirizine HCl (ZYRTEC PO), Take  by mouth., Disp: , Rfl:   •  donepezil (ARICEPT) 10 MG tablet, Take 1 tablet by mouth every night., Disp: , Rfl:   •  escitalopram (LEXAPRO) 20 MG tablet, Take 20 mg by mouth Daily., Disp: , Rfl:   •  fluticasone (FLONASE) 50 MCG/ACT nasal spray, 2 sprays into each nostril Daily As Needed for Rhinitis or Allergies., Disp: , Rfl:   •  furosemide (LASIX) 20 MG tablet, TAKE 1 TABLET BY MOUTH ON MONDAY, WEDNESDAY AND FRIDAY, Disp: 36 tablet, Rfl: 3  •  hydroCHLOROthiazide (HYDRODIURIL) 12.5 MG tablet, Take 1 tablet by mouth once daily, Disp: 90 tablet, Rfl: 1  •  losartan (COZAAR) 100 MG tablet, Take 1 tablet by mouth once daily, Disp: 90 tablet, Rfl: 3  •  montelukast (SINGULAIR) 10 MG tablet, Take 1 tablet by mouth once daily, Disp: 90 tablet, Rfl: 3  •  Multiple Vitamin (MULTI-DAY VITAMINS) tablet, Take 1 tablet by mouth daily., Disp: , Rfl:   •  O2 (OXYGEN), Inhale 1 L/min Every Night., Disp: , Rfl:   •  pantoprazole (PROTONIX) 40 MG EC tablet, Take 1 tablet by mouth once daily, Disp: 90 tablet, Rfl: 3  •  sotalol (BETAPACE) 80 MG tablet, Take 1/2 (one-half) tablet by mouth twice daily, Disp: 90 tablet, Rfl: 3  •  vitamin B-12 (CYANOCOBALAMIN) 1000 MCG tablet, Take 500 mcg by mouth Daily., Disp: , Rfl:   •  memantine (NAMENDA) 5 MG tablet, Take 10 mg by mouth 2 (Two) Times a Day., Disp: , Rfl:    "    History of Present Illness   Nery Garcia is a 70 y.o. year old female here for follow up.    Pt denies any chest pain, dyspnea, dyspnea on exertion, orthopnea, PND, palpitations, lower extremity edema, or claudication.  Accompanied by her daughter.  Other than memory impairment she has had no issues      OBJECTIVE:  Vitals:    08/30/21 1316   BP: 110/58   BP Location: Right arm   Patient Position: Sitting   Pulse: 51   SpO2: 98%   Weight: 93.9 kg (207 lb)   Height: 172.7 cm (68\")     Body mass index is 31.47 kg/m².    Constitutional:       Appearance: Healthy appearance. Not in distress.   Neck:      Vascular: No JVR. JVD normal.   Pulmonary:      Effort: Pulmonary effort is normal.      Breath sounds: Normal breath sounds. No wheezing. No rhonchi. No rales.   Chest:      Chest wall: Not tender to palpatation.   Cardiovascular:      PMI at left midclavicular line. Bradycardia present. Regular rhythm. Normal S1. Normal S2.      Murmurs: There is no murmur.      No gallop. No click. No rub.   Pulses:     Intact distal pulses.   Edema:     Peripheral edema absent.   Abdominal:      General: Bowel sounds are normal.      Palpations: Abdomen is soft.      Tenderness: There is no abdominal tenderness.   Musculoskeletal: Normal range of motion.         General: No tenderness. Skin:     General: Skin is warm and dry.   Neurological:      General: No focal deficit present.      Mental Status: Alert and oriented to person, place and time.         Diagnostic Data:    ECG 12 Lead    Date/Time: 8/30/2021 1:28 PM  Performed by: Mega Kendall MD  Authorized by: Mega Kendall MD   Rhythm: sinus bradycardia  BPM: 49    Clinical impression: non-specific ECG              ASSESSMENT:   Diagnosis Plan   1. Paroxysmal atrial fibrillation (CMS/HCC)     2. Essential hypertension     3. Mixed hyperlipidemia         PLAN:  Paroxysmal A. fib maintaining sinus mechanism on half dose sotalol    Hypertension controlled amlodipine " losartan HCTZ    Dyslipidemia on statin therapy          Mega Kendall MD, FACC

## 2021-09-02 ENCOUNTER — HOSPITAL ENCOUNTER (OUTPATIENT)
Dept: MAMMOGRAPHY | Facility: HOSPITAL | Age: 71
Discharge: HOME | End: 2021-09-02
Admitting: INTERNAL MEDICINE

## 2021-09-02 DIAGNOSIS — Z12.31 SCREENING MAMMOGRAM, ENCOUNTER FOR: ICD-10-CM

## 2021-09-02 PROCEDURE — 77063 BREAST TOMOSYNTHESIS BI: CPT

## 2021-09-02 PROCEDURE — 77067 SCR MAMMO BI INCL CAD: CPT

## 2021-09-23 ENCOUNTER — HOSPITAL ENCOUNTER (OUTPATIENT)
Dept: GENERAL RADIOLOGY | Facility: HOSPITAL | Age: 71
Discharge: HOME | End: 2021-09-23
Admitting: INTERNAL MEDICINE

## 2021-09-23 ENCOUNTER — OFFICE VISIT (OUTPATIENT)
Dept: INTERNAL MEDICINE | Facility: CLINIC | Age: 71
End: 2021-09-23

## 2021-09-23 VITALS
HEART RATE: 52 BPM | DIASTOLIC BLOOD PRESSURE: 62 MMHG | WEIGHT: 206 LBS | SYSTOLIC BLOOD PRESSURE: 120 MMHG | OXYGEN SATURATION: 96 % | TEMPERATURE: 97 F | HEIGHT: 68 IN | BODY MASS INDEX: 31.22 KG/M2

## 2021-09-23 DIAGNOSIS — R06.02 SHORTNESS OF BREATH: ICD-10-CM

## 2021-09-23 DIAGNOSIS — R06.02 SHORTNESS OF BREATH: Primary | ICD-10-CM

## 2021-09-23 PROCEDURE — 99213 OFFICE O/P EST LOW 20 MIN: CPT | Performed by: INTERNAL MEDICINE

## 2021-09-23 PROCEDURE — 71046 X-RAY EXAM CHEST 2 VIEWS: CPT

## 2021-09-23 RX ORDER — ATORVASTATIN CALCIUM 40 MG/1
40 TABLET, FILM COATED ORAL DAILY
Qty: 90 TABLET | Refills: 1 | Status: SHIPPED | OUTPATIENT
Start: 2021-09-23 | End: 2022-01-12 | Stop reason: SDUPTHER

## 2021-09-23 RX ORDER — AMLODIPINE BESYLATE 10 MG/1
10 TABLET ORAL DAILY
Qty: 90 TABLET | Refills: 1 | Status: SHIPPED | OUTPATIENT
Start: 2021-09-23 | End: 2022-01-12 | Stop reason: SDUPTHER

## 2021-09-23 RX ORDER — LOSARTAN POTASSIUM 100 MG/1
100 TABLET ORAL DAILY
Qty: 90 TABLET | Refills: 1 | Status: SHIPPED | OUTPATIENT
Start: 2021-09-23 | End: 2021-11-16

## 2021-09-23 NOTE — PROGRESS NOTES
"Chief Complaint   Patient presents with   • Abstract     Daughter states Pt's had SOA last couple of days and has a headache everyday for about 6-8 months.      Subjective   Nery Garcia is a 70 y.o. female.     Here today for complaints of SOA and HAs.  Here today with complaints of SOA for past several days.  With any ambulation she has been SOA for past few days per daughter.  No cough.  No fever.  She has a good appetite.  She has no cough or allergy sxs.  Patient still has not had her Covid vaccine.  She has an ongoing HA.  She saw her neurologist and they weaned off namenda and that made no difference in her HA.   Her mood is up and down per daughter.  She may be happy in the morning, but then very depressed in the evening.  She is still living alone, but next door to her daughter, they bring her breakfast and dinner every day and leave her a mid afternoon snack       The following portions of the patient's history were reviewed and updated as appropriate: allergies, current medications, past family history, past medical history, past social history, past surgical history and problem list.    Review of Systems   Constitutional: Negative for activity change, appetite change, fatigue and fever.   HENT: Negative.    Respiratory: Positive for shortness of breath. Negative for cough.    Gastrointestinal: Negative.    Allergic/Immunologic: Negative for environmental allergies.   Psychiatric/Behavioral: Positive for confusion and dysphoric mood.       Objective   /62   Pulse 52   Temp 97 °F (36.1 °C)   Ht 172.7 cm (68\")   Wt 93.4 kg (206 lb)   SpO2 96%   BMI 31.32 kg/m²   Body mass index is 31.32 kg/m².  Physical Exam  Vitals and nursing note reviewed.   Constitutional:       General: She is not in acute distress.     Appearance: Normal appearance. She is well-developed. She is not ill-appearing.      Comments: Kind and pleasant female, appears stated age and in NAD today   HENT:      Head: Normocephalic " and atraumatic.      Right Ear: External ear normal.      Left Ear: External ear normal.      Mouth/Throat:      Pharynx: No posterior oropharyngeal erythema.   Eyes:      General:         Right eye: No discharge.         Left eye: No discharge.      Extraocular Movements: Extraocular movements intact.      Conjunctiva/sclera: Conjunctivae normal.   Neck:      Thyroid: No thyromegaly.      Vascular: No carotid bruit.      Comments: No thyromegaly or mass  Cardiovascular:      Rate and Rhythm: Normal rate and regular rhythm.      Pulses: Normal pulses.      Heart sounds: Normal heart sounds. No murmur heard.     Pulmonary:      Effort: Pulmonary effort is normal. No respiratory distress.      Breath sounds: Normal breath sounds. No wheezing.   Musculoskeletal:         General: Normal range of motion.      Cervical back: Normal range of motion and neck supple.      Right lower leg: No edema.      Left lower leg: No edema.   Lymphadenopathy:      Cervical: No cervical adenopathy.   Skin:     General: Skin is warm.      Findings: No rash.   Neurological:      General: No focal deficit present.      Mental Status: She is alert. Mental status is at baseline.      Cranial Nerves: No cranial nerve deficit.      Motor: No weakness.      Coordination: Coordination normal.      Gait: Gait normal.   Psychiatric:         Behavior: Behavior normal.         Assessment/Plan   Nery Jose is here today and the following problems have been addressed:      Diagnoses and all orders for this visit:    1. Shortness of breath (Primary)  -     XR Chest PA & Lateral; Future  -     CBC & Differential  -     Comprehensive Metabolic Panel    Other orders  -     amLODIPine (NORVASC) 10 MG tablet; Take 1 tablet by mouth Daily.  Dispense: 90 tablet; Refill: 1  -     atorvastatin (LIPITOR) 40 MG tablet; Take 1 tablet by mouth Daily.  Dispense: 90 tablet; Refill: 1  -     losartan (COZAAR) 100 MG tablet; Take 1 tablet by mouth Daily.  Dispense: 90  tablet; Refill: 1      Chest x-ray and labs as noted due to complaints of shortness of breath  Discontinue Singulair as patient has minimal complaints of allergies and this may be causing her chronic headaches  If labs or chest x-ray are suggestive of infection I will start antibiotics on patient  Will contact family with lab and x-ray results, based on this may suggest Covid vaccine now if there are no abnormalities, otherwise will wait until after antibiotics if needed  Will see patient again in 4 weeks, if headache persists will consider discontinuing Wellbutrin XL at that time      Return in about 4 weeks (around 10/21/2021) for Next scheduled follow up.      Marilyn K. Vermeesch, MD      Please note that portions of this note were completed with a voice recognition program.  Efforts were made to edit dictation, but occasionally words are mistranscribed.

## 2021-09-24 LAB
ALBUMIN SERPL-MCNC: 4.2 G/DL (ref 3.8–4.8)
ALBUMIN/GLOB SERPL: 2 {RATIO} (ref 1.2–2.2)
ALP SERPL-CCNC: 57 IU/L (ref 44–121)
ALT SERPL-CCNC: 16 IU/L (ref 0–32)
AST SERPL-CCNC: 18 IU/L (ref 0–40)
BASOPHILS # BLD AUTO: 0 X10E3/UL (ref 0–0.2)
BASOPHILS NFR BLD AUTO: 1 %
BILIRUB SERPL-MCNC: 0.2 MG/DL (ref 0–1.2)
BUN SERPL-MCNC: 18 MG/DL (ref 8–27)
BUN/CREAT SERPL: 15 (ref 12–28)
CALCIUM SERPL-MCNC: 9.2 MG/DL (ref 8.7–10.3)
CHLORIDE SERPL-SCNC: 107 MMOL/L (ref 96–106)
CO2 SERPL-SCNC: 26 MMOL/L (ref 20–29)
CREAT SERPL-MCNC: 1.19 MG/DL (ref 0.57–1)
EOSINOPHIL # BLD AUTO: 0.3 X10E3/UL (ref 0–0.4)
EOSINOPHIL NFR BLD AUTO: 4 %
ERYTHROCYTE [DISTWIDTH] IN BLOOD BY AUTOMATED COUNT: 12.3 % (ref 11.7–15.4)
GLOBULIN SER CALC-MCNC: 2.1 G/DL (ref 1.5–4.5)
GLUCOSE SERPL-MCNC: 88 MG/DL (ref 65–99)
HCT VFR BLD AUTO: 36.9 % (ref 34–46.6)
HGB BLD-MCNC: 12.2 G/DL (ref 11.1–15.9)
IMM GRANULOCYTES # BLD AUTO: 0 X10E3/UL (ref 0–0.1)
IMM GRANULOCYTES NFR BLD AUTO: 0 %
LYMPHOCYTES # BLD AUTO: 2.4 X10E3/UL (ref 0.7–3.1)
LYMPHOCYTES NFR BLD AUTO: 36 %
MCH RBC QN AUTO: 30.3 PG (ref 26.6–33)
MCHC RBC AUTO-ENTMCNC: 33.1 G/DL (ref 31.5–35.7)
MCV RBC AUTO: 92 FL (ref 79–97)
MONOCYTES # BLD AUTO: 0.4 X10E3/UL (ref 0.1–0.9)
MONOCYTES NFR BLD AUTO: 6 %
NEUTROPHILS # BLD AUTO: 3.6 X10E3/UL (ref 1.4–7)
NEUTROPHILS NFR BLD AUTO: 53 %
PLATELET # BLD AUTO: 184 X10E3/UL (ref 150–450)
POTASSIUM SERPL-SCNC: 3.7 MMOL/L (ref 3.5–5.2)
PROT SERPL-MCNC: 6.3 G/DL (ref 6–8.5)
RBC # BLD AUTO: 4.02 X10E6/UL (ref 3.77–5.28)
SODIUM SERPL-SCNC: 147 MMOL/L (ref 134–144)
WBC # BLD AUTO: 6.8 X10E3/UL (ref 3.4–10.8)

## 2021-09-24 NOTE — PROGRESS NOTES
Please call daughter with lab results.  Complete blood count is normal suggesting no evidence of infection.  Her kidney function is slightly abnormal and sodium and chloride levels are elevated suggesting she is likely dehydrated.  I recommend they try to encourage her to drink more water.  Avoid sports drinks which contain more sodium and chloride.  I do not have official chest x-ray reading but I looked at it personally and do not see evidence of pneumonia or congestive heart failure.  If you would like to wait to contact family I would likely have report later today.

## 2021-10-25 ENCOUNTER — HOSPITAL ENCOUNTER (OUTPATIENT)
Dept: GENERAL RADIOLOGY | Facility: HOSPITAL | Age: 71
Discharge: HOME | End: 2021-10-25
Admitting: INTERNAL MEDICINE

## 2021-10-25 ENCOUNTER — OFFICE VISIT (OUTPATIENT)
Dept: INTERNAL MEDICINE | Facility: CLINIC | Age: 71
End: 2021-10-25

## 2021-10-25 VITALS
HEIGHT: 67 IN | TEMPERATURE: 97 F | OXYGEN SATURATION: 96 % | DIASTOLIC BLOOD PRESSURE: 62 MMHG | WEIGHT: 205 LBS | SYSTOLIC BLOOD PRESSURE: 120 MMHG | HEART RATE: 55 BPM | BODY MASS INDEX: 32.18 KG/M2

## 2021-10-25 DIAGNOSIS — G89.29 CHRONIC BILATERAL LOW BACK PAIN WITHOUT SCIATICA: ICD-10-CM

## 2021-10-25 DIAGNOSIS — F32.0 CURRENT MILD EPISODE OF MAJOR DEPRESSIVE DISORDER WITHOUT PRIOR EPISODE (HCC): ICD-10-CM

## 2021-10-25 DIAGNOSIS — M54.50 CHRONIC BILATERAL LOW BACK PAIN WITHOUT SCIATICA: ICD-10-CM

## 2021-10-25 DIAGNOSIS — G44.229 CHRONIC TENSION-TYPE HEADACHE, NOT INTRACTABLE: Primary | ICD-10-CM

## 2021-10-25 PROBLEM — G44.209 TENSION TYPE HEADACHE: Status: ACTIVE | Noted: 2021-10-25

## 2021-10-25 PROBLEM — Z11.59 ENCOUNTER FOR HEPATITIS C SCREENING TEST FOR LOW RISK PATIENT: Status: RESOLVED | Noted: 2021-07-12 | Resolved: 2021-10-25

## 2021-10-25 PROCEDURE — 99213 OFFICE O/P EST LOW 20 MIN: CPT | Performed by: INTERNAL MEDICINE

## 2021-10-25 PROCEDURE — 90662 IIV NO PRSV INCREASED AG IM: CPT | Performed by: INTERNAL MEDICINE

## 2021-10-25 PROCEDURE — 72100 X-RAY EXAM L-S SPINE 2/3 VWS: CPT

## 2021-10-25 PROCEDURE — G0008 ADMIN INFLUENZA VIRUS VAC: HCPCS | Performed by: INTERNAL MEDICINE

## 2021-10-25 RX ORDER — MIRTAZAPINE 15 MG/1
15 TABLET, FILM COATED ORAL NIGHTLY
Qty: 30 TABLET | Refills: 1 | Status: SHIPPED | OUTPATIENT
Start: 2021-10-25 | End: 2022-01-03

## 2021-10-25 NOTE — PROGRESS NOTES
Chief Complaint   Patient presents with   • Follow-up     4 weeks for headaches     Subjective   Nery Garcia is a 70 y.o. female.     Here today for follow-up of headaches.  4 weeks ago patient was here and we discontinued Singulair to see if her headaches would resolve.  She has had headaches chronically for approximately 8 months.  She was also complaining of shortness of breath at that time.  Chest x-ray was completely normal as was CBC.  She was seen at urgent care shortly after our visit for her complaint of shortness of breath.  She also was complaining of back pain at that time.  She was treated for urinary tract infection with Macrobid and was given a shot of Depo-Medrol 80 mg for chronic back pain issues.  Nurse practitioner at urgent care again could not determine any cause of shortness of breath.  Her urine culture had no significant growth.  She continues to complain of headaches.  Daughter does not believe that discontinuing Singulair has made much difference, therefore our plan is to discontinue Wellbutrin also.  She continues to complain of ongoing shortness of breath with any exertion also.  Treatment of UTI with Macrodantin and steroid injection for back pain did not make much difference in regards to her complaints of back pain.  Patient essentially sits all day and does not do much in regard to movement or ambulation.  Daughter does not believe that patient sleeps much, as her sister lives next door or across the street and states that their mother is awake much of the night looking out the windows or out the front door.       The following portions of the patient's history were reviewed and updated as appropriate: allergies, current medications, past family history, past medical history, past social history, past surgical history and problem list.    Review of Systems   Constitutional: Negative for activity change, appetite change and unexpected weight change.   Eyes: Negative for visual  "disturbance.   Respiratory: Positive for shortness of breath.    Cardiovascular: Negative for chest pain, palpitations and leg swelling.   Gastrointestinal: Negative for abdominal pain.   Genitourinary: Negative for hematuria.   Musculoskeletal: Positive for back pain.   Neurological: Positive for headaches.   Psychiatric/Behavioral: Positive for confusion, dysphoric mood and sleep disturbance.       Objective   /62   Pulse 55   Temp 97 °F (36.1 °C)   Ht 170.2 cm (67\")   Wt 93 kg (205 lb)   SpO2 96%   BMI 32.11 kg/m²   Body mass index is 32.11 kg/m².  Physical Exam  Vitals and nursing note reviewed.   Constitutional:       General: She is not in acute distress.     Appearance: Normal appearance. She is obese. She is not ill-appearing.      Comments: Kind and pleasant female, appears her age and is in no distress today   HENT:      Right Ear: External ear normal.      Left Ear: External ear normal.   Eyes:      General:         Right eye: No discharge.         Left eye: No discharge.      Extraocular Movements: Extraocular movements intact.   Pulmonary:      Effort: Pulmonary effort is normal. No respiratory distress.   Neurological:      General: No focal deficit present.      Mental Status: She is alert.      Cranial Nerves: No cranial nerve deficit.      Motor: No weakness.      Gait: Gait normal.   Psychiatric:         Mood and Affect: Mood normal.         Behavior: Behavior normal.         Assessment/Plan   Nery Garcia is here today and the following problems have been addressed:      Diagnoses and all orders for this visit:    1. Chronic tension-type headache, not intractable (Primary)    2. Current mild episode of major depressive disorder without prior episode (HCC)    3. Chronic bilateral low back pain without sciatica  -     XR Spine Lumbar 2 or 3 View; Future    Other orders  -     mirtazapine (Remeron) 15 MG tablet; Take 1 tablet by mouth Every Night.  Dispense: 30 tablet; Refill: 1  -     " Fluzone High-Dose 65+yrs (2590-9670)    Discontinue Wellbutrin and Lexapro to see if headaches will improve  Start Remeron 15 mg 1 hour before bed every night to help with depression, sleep and anxiety issues  XR lumbar spine due to chronic back pain  CBD oil may be used to supplement with Tylenol to help with her chronic back pain issues  Salon pas arthritis patch recommended to help with low back pain  Flu vaccine today    Return to clinic in 6 weeks    Please note that portions of this note were completed with a voice recognition program.  Efforts were made to edit dictation, but occasionally words are mistranscribed.

## 2021-10-26 NOTE — PROGRESS NOTES
Please call daughter and tell her x-ray of back is consistent with degenerative disc disease worse at L4/5 area or lower back area.  She has mild arthritis of hips.  Her mother would probably benefit from physical therapy.  Would she be interested in a referral to physical therapy, if so send me referral under diagnosis of degenerative disc disease lumbar spine and I will sign.

## 2021-10-27 ENCOUNTER — TELEPHONE (OUTPATIENT)
Dept: INTERNAL MEDICINE | Facility: CLINIC | Age: 71
End: 2021-10-27

## 2021-10-27 NOTE — TELEPHONE ENCOUNTER
Attempted contacting daughter, no answer.     HUB TO SHARE: X-ray of back is consistent with degenerative disc disease worse at L4/5 area or lower back area.  Nery has mild arthritis of hips and would probably benefit from physical therapy.  Would she be interested in a referral to physical therapy?

## 2021-10-27 NOTE — TELEPHONE ENCOUNTER
----- Message from Marilyn K Vermeesch, MD sent at 10/26/2021 11:44 AM EDT -----  Please call daughter and tell her x-ray of back is consistent with degenerative disc disease worse at L4/5 area or lower back area.  She has mild arthritis of hips.  Her mother would probably benefit from physical therapy.  Would she be interested in   a referral to physical therapy, if so send me referral under diagnosis of degenerative disc disease lumbar spine and I will sign.

## 2021-10-28 DIAGNOSIS — G89.29 CHRONIC BILATERAL LOW BACK PAIN WITHOUT SCIATICA: Primary | ICD-10-CM

## 2021-10-28 DIAGNOSIS — M54.50 CHRONIC BILATERAL LOW BACK PAIN WITHOUT SCIATICA: Primary | ICD-10-CM

## 2021-11-01 ENCOUNTER — APPOINTMENT (OUTPATIENT)
Dept: CT IMAGING | Facility: HOSPITAL | Age: 71
End: 2021-11-01

## 2021-11-01 ENCOUNTER — HOSPITAL ENCOUNTER (EMERGENCY)
Facility: HOSPITAL | Age: 71
Discharge: HOME | End: 2021-11-01
Attending: EMERGENCY MEDICINE | Admitting: EMERGENCY MEDICINE

## 2021-11-01 VITALS
RESPIRATION RATE: 18 BRPM | SYSTOLIC BLOOD PRESSURE: 126 MMHG | BODY MASS INDEX: 32.18 KG/M2 | OXYGEN SATURATION: 95 % | HEART RATE: 58 BPM | DIASTOLIC BLOOD PRESSURE: 67 MMHG | TEMPERATURE: 98.1 F | WEIGHT: 205 LBS | HEIGHT: 67 IN

## 2021-11-01 DIAGNOSIS — S01.81XA FACIAL LACERATION, INITIAL ENCOUNTER: ICD-10-CM
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
## 2021-11-01 DIAGNOSIS — Z79.01 ON ANTICOAGULANT THERAPY: ICD-10-CM

## 2021-11-01 LAB
ALBUMIN SERPL-MCNC: 4.2 G/DL (ref 3.5–5.2)
ALBUMIN/GLOB SERPL: 2 G/DL
ALP SERPL-CCNC: 62 U/L (ref 39–117)
ALT SERPL W P-5'-P-CCNC: 23 U/L (ref 1–33)
ANION GAP SERPL CALCULATED.3IONS-SCNC: 7.6 MMOL/L (ref 5–15)
AST SERPL-CCNC: 18 U/L (ref 1–32)
BASOPHILS # BLD AUTO: 0.04 10*3/MM3 (ref 0–0.2)
BASOPHILS NFR BLD AUTO: 0.6 % (ref 0–1.5)
BILIRUB SERPL-MCNC: 0.3 MG/DL (ref 0–1.2)
BUN SERPL-MCNC: 20 MG/DL (ref 8–23)
BUN/CREAT SERPL: 19.2 (ref 7–25)
CALCIUM SPEC-SCNC: 9.4 MG/DL (ref 8.6–10.5)
CHLORIDE SERPL-SCNC: 105 MMOL/L (ref 98–107)
CO2 SERPL-SCNC: 28.4 MMOL/L (ref 22–29)
CREAT SERPL-MCNC: 1.04 MG/DL (ref 0.57–1)
DEPRECATED RDW RBC AUTO: 41.4 FL (ref 37–54)
EOSINOPHIL # BLD AUTO: 0.15 10*3/MM3 (ref 0–0.4)
EOSINOPHIL NFR BLD AUTO: 2.1 % (ref 0.3–6.2)
ERYTHROCYTE [DISTWIDTH] IN BLOOD BY AUTOMATED COUNT: 12.5 % (ref 12.3–15.4)
GFR SERPL CREATININE-BSD FRML MDRD: 53 ML/MIN/1.73
GLOBULIN UR ELPH-MCNC: 2.1 GM/DL
GLUCOSE SERPL-MCNC: 106 MG/DL (ref 65–99)
HCT VFR BLD AUTO: 37.2 % (ref 34–46.6)
HGB BLD-MCNC: 12.2 G/DL (ref 12–15.9)
IMM GRANULOCYTES # BLD AUTO: 0.03 10*3/MM3 (ref 0–0.05)
IMM GRANULOCYTES NFR BLD AUTO: 0.4 % (ref 0–0.5)
LYMPHOCYTES # BLD AUTO: 2.3 10*3/MM3 (ref 0.7–3.1)
LYMPHOCYTES NFR BLD AUTO: 32.4 % (ref 19.6–45.3)
MCH RBC QN AUTO: 29.8 PG (ref 26.6–33)
MCHC RBC AUTO-ENTMCNC: 32.8 G/DL (ref 31.5–35.7)
MCV RBC AUTO: 91 FL (ref 79–97)
MONOCYTES # BLD AUTO: 0.53 10*3/MM3 (ref 0.1–0.9)
MONOCYTES NFR BLD AUTO: 7.5 % (ref 5–12)
NEUTROPHILS NFR BLD AUTO: 4.04 10*3/MM3 (ref 1.7–7)
NEUTROPHILS NFR BLD AUTO: 57 % (ref 42.7–76)
NRBC BLD AUTO-RTO: 0 /100 WBC (ref 0–0.2)
PLATELET # BLD AUTO: 178 10*3/MM3 (ref 140–450)
PMV BLD AUTO: 9.7 FL (ref 6–12)
POTASSIUM SERPL-SCNC: 3.8 MMOL/L (ref 3.5–5.2)
PROT SERPL-MCNC: 6.3 G/DL (ref 6–8.5)
RBC # BLD AUTO: 4.09 10*6/MM3 (ref 3.77–5.28)
SODIUM SERPL-SCNC: 141 MMOL/L (ref 136–145)
TROPONIN T SERPL-MCNC: <0.01 NG/ML (ref 0–0.03)
WBC # BLD AUTO: 7.09 10*3/MM3 (ref 3.4–10.8)

## 2021-11-01 PROCEDURE — 93005 ELECTROCARDIOGRAM TRACING: CPT | Performed by: EMERGENCY MEDICINE

## 2021-11-01 PROCEDURE — 70486 CT MAXILLOFACIAL W/O DYE: CPT

## 2021-11-01 PROCEDURE — 70450 CT HEAD/BRAIN W/O DYE: CPT

## 2021-11-01 PROCEDURE — 80053 COMPREHEN METABOLIC PANEL: CPT | Performed by: EMERGENCY MEDICINE

## 2021-11-01 PROCEDURE — 84484 ASSAY OF TROPONIN QUANT: CPT | Performed by: EMERGENCY MEDICINE

## 2021-11-01 PROCEDURE — 99283 EMERGENCY DEPT VISIT LOW MDM: CPT

## 2021-11-01 PROCEDURE — 85025 COMPLETE CBC W/AUTO DIFF WBC: CPT | Performed by: EMERGENCY MEDICINE

## 2021-11-01 RX ORDER — SODIUM CHLORIDE 0.9 % (FLUSH) 0.9 %
10 SYRINGE (ML) INJECTION AS NEEDED
Status: DISCONTINUED | OUTPATIENT
Start: 2021-11-01 | End: 2021-11-01 | Stop reason: HOSPADM

## 2021-11-01 RX ORDER — LIDOCAINE HYDROCHLORIDE AND EPINEPHRINE 10; 10 MG/ML; UG/ML
10 INJECTION, SOLUTION INFILTRATION; PERINEURAL ONCE
Status: COMPLETED | OUTPATIENT
Start: 2021-11-01 | End: 2021-11-01

## 2021-11-01 RX ADMIN — LIDOCAINE HYDROCHLORIDE,EPINEPHRINE BITARTRATE 10 ML: 10; .01 INJECTION, SOLUTION INFILTRATION; PERINEURAL at 07:08

## 2021-11-01 NOTE — ED NOTES
Contacted [facility],  per ALEXANDRU Diana for consult. Awaiting a call back.     Brenna Hunt  11/01/21 0802

## 2021-11-01 NOTE — ED PROVIDER NOTES
Subjective   History of Present Illness    Chief Complaint: Fall injury  History of Present Illness: 89-year-old female is by herself, chronically anticoagulated with Eliquis for history of A. fib. History of dementia. Fall just prior to arrival this morning. Patient is unsure of circumstances and does not remember the fall.  Onset: Today just prior  Duration: Single event  Exacerbating / Alleviating factors: None  Associated symptoms: None      Nurses Notes reviewed and agree, including vitals, allergies, social history and prior medical history.     REVIEW OF SYSTEMS: All systems reviewed and not pertinent unless noted.    Positive for: Fall injury left cheek contusion and left periorbital contusion and cheek laceration    Negative for: Neck pain back pain chest pain shortness of breath urinary symptoms  Review of Systems    Past Medical History:   Diagnosis Date   • Acute bronchitis    • Acute otitis media    • Anxiety    • Atrial fibrillation (HCC)    • Body piercing     EARS   • CAD (coronary artery disease)    • CAD (coronary artery disease)    • Cardiac insufficiency (HCC)    • Chest pain    • Colitis    • COPD (chronic obstructive pulmonary disease) (HCC)    • Dementia (HCC)     PATIENT REPORTS VERY EARLY STAGES OF THIS.  REPORTS CURRENTLY LIVES BY HERSELF.   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 BUT DOES NOT WEAR THEM ANYMORE   • Hepatitis     REPORTS AS A CHILD, UNSURE TYPE   • History of esophageal reflux    • History of obesity    • Hyperlipidemia    • Hypertension    • Iron deficiency anemia    • Obesity      TRUNKAL   • On home oxygen therapy     REPORTS SHE WEARS AT 1L [facility]   • Otitis externa    • Peptic ulcer disease     noted on EGD, November 2013   • Pneumonia    • Pulmonary embolus (HCC) 01/01/2012   • RLS (restless legs syndrome)     • Wears glasses    • Wears partial dentures     FULL UPPER PLATE AND PARTIAL LOWER PLATE       Allergies   Allergen Reactions   • Lisinopril Cough       Past Surgical History:   Procedure Laterality Date   • BREAST EXCISIONAL BIOPSY Bilateral     Patient unsure of dates-    • BREAST SURGERY Bilateral     lumpectomies, PATIENT REPORTS BENIGN AND THAT THERE ARE NO RESTRICTIONS ON BUE'S   • CATARACT EXTRACTION Bilateral    • COLONOSCOPY N/A 2017    Procedure: COLONOSCOPY WITH COLD BIOPSY POLYPECTOMY; BIOPSIES;  Surgeon: Severiano Calloway MD;  Location: [facility];  Service:    • CORONARY ANGIOPLASTY  10/01/2012    Nonobstructive coronary artery disease with normal left ventricular function    • HYSTERECTOMY     • OOPHORECTOMY         Family History   Problem Relation Age of Onset   • Heart attack Mother    • Stroke Mother    • Heart attack Father    • Stroke Father    • Heart attack Brother    • Breast cancer Neg Hx    • Ovarian cancer Neg Hx    • Colon cancer Neg Hx        Social History     Socioeconomic History   • Marital status:    Tobacco Use   • Smoking status: Former Smoker     Packs/day: 3.00     Years: 35.00     Pack years: 105.00     Types: Cigarettes     Quit date:      Years since quittin.8   • Smokeless tobacco: Never Used   Vaping Use   • Vaping Use: Never used   Substance and Sexual Activity   • Alcohol use: Yes     Comment: REPORTS HAS SEVERAL MIXED DRINKS WEEKLY   • Drug use: No   • Sexual activity: Defer           Objective   Physical Exam    CONSTITUTIONAL: Well developed, nontoxic pleasant 70-year-old female,  in no acute distress.  VITAL SIGNS: per nursing, reviewed and noted  SKIN: exposed skin with left periorbital contusion and cheek contusion with 1.3 cm vertically oriented laceration over the lateral zygoma  EYES: perrla. EOMI.  ENT: Normal voice. Moist mucous membranes. No oropharyngeal injury  RESPIRATORY:  No increased work of breathing. No retractions.    CARDIOVASCULAR:  regular rate and rhythm, no murmurs.  Good Peripheral pulses. Good cap refill to extremities.   GI: Abdomen soft, nontender, normal bowel sounds. No hernia. No ascites.  MUSCULOSKELETAL:  No tenderness. Full ROM. Strength and tone grossly normal.  no spasms. no neck or back tenderness or spasm.   NEUROLOGIC: Alert, oriented x 3. No gross deficits. GCS 15.   PSYCH: appropriate affect.  : no bladder tenderness or distention, no CVA tenderness      Procedures        Laceration Repair: 1.3 cm laceration to the left cheek  Consent obtained, discussed with patient all risks and benefits.   Patient underwent sterile prep technique with Betadine  Anesthesia was obtained with lidocaine 1% with epinephrine  Laceration was closed with 5-0 Ethilon interrupted sutures #4  Dressing pressure dressing and ice pack  Patient was examined post procedure and was neurovascularly intact  Tolerated well        ED Course  ED Course as of 11/01/21 1024   Mon Nov 01, 2021   0745 EKG interpreted by me: Sinus bradycardia, right bundle branch block, no acute ST/T changes, this is an abnormal EKG, this is unchanged compared to previous [MP]      ED Course User Index  [MP] Agustín Brown MD      70-year-old female presents with fall injury striking table. She is somewhat amnestic of events. Does have a history of heart disease. History of dementia. With CT head and facial bones, cardiac work-up. Cardiac monitoring. Patient will be signed out to oncoming physician for final disposition. Successful wound closure facial laceration.      10:24 EDT CT imaging per radiology is negative.  Lab work largely unremarkable.  She feels well.  Patient's daughter did request to speak with case management, she has been provided resources.  They are comfortable with discharge home at this time.                                  MDM    Final diagnoses:   Fall, initial encounter   Facial laceration, initial encounter   On anticoagulant  therapy        Kevin, Agustín Philippe MD  11/01/21 3183

## 2021-11-16 RX ORDER — SOTALOL HYDROCHLORIDE 80 MG/1
TABLET ORAL
Qty: 90 TABLET | Refills: 1 | Status: SHIPPED | OUTPATIENT
Start: 2021-11-16 | End: 2022-05-23

## 2021-11-16 RX ORDER — LOSARTAN POTASSIUM 100 MG/1
TABLET ORAL
Qty: 90 TABLET | Refills: 0 | Status: SHIPPED | OUTPATIENT
Start: 2021-11-16 | End: 2022-01-12 | Stop reason: SDUPTHER

## 2021-11-24 ENCOUNTER — TELEPHONE (OUTPATIENT)
Dept: INTERNAL MEDICINE | Facility: CLINIC | Age: 71
End: 2021-11-24

## 2021-11-24 RX ORDER — DONEPEZIL HYDROCHLORIDE 10 MG/1
10 TABLET, FILM COATED ORAL NIGHTLY
Qty: 90 TABLET | Refills: 0 | Status: SHIPPED | OUTPATIENT
Start: 2021-11-24

## 2021-11-24 NOTE — TELEPHONE ENCOUNTER
----- Message from Nery Garcia sent at 11/24/2021 11:48 AM EST -----  Regarding: Medication Refill Request  Jones, my Mom, Nery Garcia, is in need of a medication refill of Donepezil. This medication is typically prescribed by Matilda Arambula of the [facility] at . I contacted Matilda for the refill but her  has suffered both a stroke and a heart attack so she is out of the office until January. She requested that I contact you to see if you can authorize the refill to the [facility] in [place]. The medication is Donepezil 10mg (1 tablet daily). Please let me know if you have questions. Thank you and Happy Thanksgiving!!!

## 2022-01-03 RX ORDER — MIRTAZAPINE 15 MG/1
TABLET, FILM COATED ORAL
Qty: 90 TABLET | Refills: 1 | Status: SHIPPED | OUTPATIENT
Start: 2022-01-03 | End: 2022-05-25

## 2022-01-12 ENCOUNTER — OFFICE VISIT (OUTPATIENT)
Dept: INTERNAL MEDICINE | Facility: CLINIC | Age: 72
End: 2022-01-12

## 2022-01-12 VITALS
WEIGHT: 209 LBS | HEIGHT: 67 IN | BODY MASS INDEX: 32.8 KG/M2 | TEMPERATURE: 97 F | OXYGEN SATURATION: 98 % | HEART RATE: 68 BPM | DIASTOLIC BLOOD PRESSURE: 60 MMHG | SYSTOLIC BLOOD PRESSURE: 124 MMHG

## 2022-01-12 DIAGNOSIS — I48.0 PAROXYSMAL ATRIAL FIBRILLATION: ICD-10-CM

## 2022-01-12 DIAGNOSIS — F03.90 DEMENTIA WITHOUT BEHAVIORAL DISTURBANCE, UNSPECIFIED DEMENTIA TYPE: ICD-10-CM

## 2022-01-12 DIAGNOSIS — E78.2 MIXED HYPERLIPIDEMIA: ICD-10-CM

## 2022-01-12 DIAGNOSIS — I10 PRIMARY HYPERTENSION: Primary | ICD-10-CM

## 2022-01-12 DIAGNOSIS — J43.8 OTHER EMPHYSEMA: ICD-10-CM

## 2022-01-12 DIAGNOSIS — R73.9 HYPERGLYCEMIA: ICD-10-CM

## 2022-01-12 DIAGNOSIS — G47.33 OBSTRUCTIVE SLEEP APNEA SYNDROME: ICD-10-CM

## 2022-01-12 PROCEDURE — 99214 OFFICE O/P EST MOD 30 MIN: CPT | Performed by: INTERNAL MEDICINE

## 2022-01-12 RX ORDER — ATORVASTATIN CALCIUM 40 MG/1
40 TABLET, FILM COATED ORAL DAILY
Qty: 90 TABLET | Refills: 1 | Status: SHIPPED | OUTPATIENT
Start: 2022-01-12 | End: 2022-04-04

## 2022-01-12 RX ORDER — HYDROCHLOROTHIAZIDE 12.5 MG/1
12.5 TABLET ORAL DAILY
Qty: 90 TABLET | Refills: 1 | Status: SHIPPED | OUTPATIENT
Start: 2022-01-12 | End: 2022-03-11

## 2022-01-12 RX ORDER — PANTOPRAZOLE SODIUM 40 MG/1
40 TABLET, DELAYED RELEASE ORAL DAILY
Qty: 90 TABLET | Refills: 1 | Status: SHIPPED | OUTPATIENT
Start: 2022-01-12 | End: 2022-01-28

## 2022-01-12 RX ORDER — LOSARTAN POTASSIUM 100 MG/1
100 TABLET ORAL DAILY
Qty: 90 TABLET | Refills: 1 | Status: SHIPPED | OUTPATIENT
Start: 2022-01-12 | End: 2022-02-11

## 2022-01-12 RX ORDER — AMLODIPINE BESYLATE 10 MG/1
10 TABLET ORAL DAILY
Qty: 90 TABLET | Refills: 1 | Status: SHIPPED | OUTPATIENT
Start: 2022-01-12 | End: 2022-07-20 | Stop reason: SDUPTHER

## 2022-01-12 RX ORDER — MEMANTINE HYDROCHLORIDE 10 MG/1
10 TABLET ORAL 2 TIMES DAILY
COMMUNITY

## 2022-01-12 RX ORDER — BUPROPION HYDROCHLORIDE 100 MG/1
100 TABLET, EXTENDED RELEASE ORAL
Qty: 30 TABLET | Refills: 6 | Status: SHIPPED | OUTPATIENT
Start: 2022-01-12 | End: 2022-05-25

## 2022-01-12 NOTE — PROGRESS NOTES
Chief Complaint   Patient presents with   • Follow-up     for depression, sleep and anxiety issues. Daughter states Pt's is constantly hungry.      Rudy Garcia is a 71 y.o. female.     Here today for follow-up of HTN, HLD, atrial fibrillation/CAD, GERD/constipation, anxiety and depression, dementia, COPD/PAT.  HTN/HLD/A. Fib/CAD-BP and heart rate are well controlled today.  Patient denies any CP, SOA, palpitations or edema.  GERD/constipation-GERD is well controlled with Protonix.  She does says she is going to get sick at times but daughter is uncertain if patient ever vomits.  No constipation issues.   Anxiety/depression/dementia-at last office visit approximately 2 to 3 months ago patient was having constant headache.  We discontinued Wellbutrin and Lexapro as I felt this combination of medication may have been causing her headache.  She was changed to Remeron at that time.  She is having significant increase in appetite since medication change.  Her daughter is bringing her food and she does not remember eating and wants more food within minutes.  She is still complaining of HAs, but not as often to her daughter.  Daughter has noted she is not hallucinating as much either since on the remeron.  The last few days patient has been saying she wants to go to the NH.  Family has noted a significant change in her memory, very poor short-term memory.  Patient continues to live alone.  Daughters are bringing her meals regularly and check on her twice daily.  Since changed to Remeron they do not see her up wandering as often at night.  COPD/PAT-  No further complaints of SOA.  On oxygen at night.    HCM-patient has received 1 COVID-vaccine, seasonal flu vaccine and pneumococcal vaccines.  Mammogram September 2021.  Colonoscopy 2017.       The following portions of the patient's history were reviewed and updated as appropriate: allergies, current medications, past family history, past medical history, past  "social history, past surgical history and problem list.    Review of Systems   Constitutional: Positive for appetite change. Negative for activity change and unexpected weight change.   Eyes: Negative for visual disturbance.   Respiratory: Negative for shortness of breath.    Cardiovascular: Negative for chest pain, palpitations and leg swelling.   Gastrointestinal: Negative for abdominal pain and constipation.   Musculoskeletal: Negative for back pain.   Neurological: Positive for headaches.   Psychiatric/Behavioral: Positive for agitation, confusion and hallucinations. Negative for dysphoric mood and sleep disturbance. The patient is nervous/anxious.        Objective   /60   Pulse 68   Temp 97 °F (36.1 °C)   Ht 170.2 cm (67\")   Wt 94.8 kg (209 lb)   SpO2 98%   BMI 32.73 kg/m²   Body mass index is 32.73 kg/m².  Physical Exam  Vitals and nursing note reviewed.   Constitutional:       General: She is not in acute distress.     Appearance: Normal appearance. She is well-developed. She is obese. She is not ill-appearing.      Comments: Kind and pleasant female, appears stated age and in NAD today   HENT:      Head: Normocephalic and atraumatic.      Right Ear: External ear normal.      Left Ear: External ear normal.   Eyes:      General:         Right eye: No discharge.         Left eye: No discharge.      Extraocular Movements: Extraocular movements intact.      Conjunctiva/sclera: Conjunctivae normal.   Neck:      Thyroid: No thyromegaly.      Vascular: No carotid bruit.      Comments: No thyromegaly or mass  Cardiovascular:      Rate and Rhythm: Normal rate and regular rhythm.      Pulses: Normal pulses.      Heart sounds: Normal heart sounds. No murmur heard.      Pulmonary:      Effort: Pulmonary effort is normal. No respiratory distress.      Breath sounds: Normal breath sounds. No wheezing.   Abdominal:      General: Bowel sounds are normal. There is no distension.      Palpations: Abdomen is soft. "      Tenderness: There is no abdominal tenderness.   Musculoskeletal:         General: Normal range of motion.      Cervical back: Normal range of motion and neck supple.      Right lower leg: No edema.      Left lower leg: No edema.   Lymphadenopathy:      Cervical: No cervical adenopathy.   Skin:     General: Skin is warm.      Findings: No rash.   Neurological:      General: No focal deficit present.      Mental Status: She is alert. Mental status is at baseline.      Cranial Nerves: No cranial nerve deficit.      Motor: No weakness.      Coordination: Coordination normal.      Gait: Gait normal.   Psychiatric:         Attention and Perception: Attention normal.         Mood and Affect: Mood is depressed. Affect is blunt and flat.         Speech: Speech normal.         Behavior: Behavior is agitated.         Cognition and Memory: Cognition is impaired. Memory is impaired.         Judgment: Judgment is inappropriate.         Assessment/Plan   Nery Garcia is here today and the following problems have been addressed:      Diagnoses and all orders for this visit:    1. Primary hypertension (Primary)    2. Mixed hyperlipidemia    3. Paroxysmal atrial fibrillation (HCC)    4. Hyperglycemia    5. Other emphysema (HCC)    6. Obstructive sleep apnea syndrome    7. Dementia without behavioral disturbance, unspecified dementia type (HCC)    Other orders  -     amLODIPine (NORVASC) 10 MG tablet; Take 1 tablet by mouth Daily.  Dispense: 90 tablet; Refill: 1  -     atorvastatin (LIPITOR) 40 MG tablet; Take 1 tablet by mouth Daily.  Dispense: 90 tablet; Refill: 1  -     hydroCHLOROthiazide (HYDRODIURIL) 12.5 MG tablet; Take 1 tablet by mouth Daily.  Dispense: 90 tablet; Refill: 1  -     losartan (COZAAR) 100 MG tablet; Take 1 tablet by mouth Daily.  Dispense: 90 tablet; Refill: 1  -     pantoprazole (PROTONIX) 40 MG EC tablet; Take 1 tablet by mouth Daily.  Dispense: 90 tablet; Refill: 1  -     buPROPion SR (Wellbutrin SR) 100  MG 12 hr tablet; Take 1 tablet by mouth Daily With Breakfast.  Dispense: 30 tablet; Refill: 6        Follow heart healthy/low salt diet  Avoid processed foods  Monitor blood pressure on occasion  Take all medications as prescribed  Will add back a low-dose of Wellbutrin  mg once daily in morning in conjunction with Remeron at night to see if low-dose Wellbutrin will blunt side effect of hunger  Continue current dose of Remeron 15 mg nightly  Continue oxygen at night only, patient is not complaining of shortness of breath at this time  Per daughter, headaches are less but she still complains of this.  This may be side effect of other medication  Her memory is significantly worse per daughter and seems to be rapidly progressing.  Family is talking about nursing home placement, however I believe patient would benefit more from a memory care unit such as [facility] or other.  I encouraged them to look at memory care units for patient  Continue Protonix, GERD is well controlled  Follow-up with neurology in regards to worsening dementia    Return in about 6 weeks (around 2/23/2022) for Next scheduled follow up.  For follow-up of dementia, depression/anxiety and appetite changes      Marilyn K. Vermeesch, MD      Please note that portions of this note were completed with a voice recognition program.  Efforts were made to edit dictation, but occasionally words are mistranscribed.

## 2022-01-24 RX ORDER — FUROSEMIDE 20 MG/1
TABLET ORAL
Qty: 36 TABLET | Refills: 2 | Status: SHIPPED | OUTPATIENT
Start: 2022-01-24 | End: 2022-07-20 | Stop reason: SDUPTHER

## 2022-01-24 NOTE — TELEPHONE ENCOUNTER
Next appt 9/2022  Lab Results   Component Value Date    GLUCOSE 106 (H) 11/01/2021    BUN 20 11/01/2021    CREATININE 1.04 (H) 11/01/2021    EGFRIFNONA 53 (L) 11/01/2021    EGFRIFAFRI 54 (L) 09/23/2021    BCR 19.2 11/01/2021    K 3.8 11/01/2021    CO2 28.4 11/01/2021    CALCIUM 9.4 11/01/2021    PROTENTOTREF 6.3 09/23/2021    ALBUMIN 4.20 11/01/2021    LABIL2 2.0 09/23/2021    AST 18 11/01/2021    ALT 23 11/01/2021

## 2022-01-28 RX ORDER — PANTOPRAZOLE SODIUM 40 MG/1
TABLET, DELAYED RELEASE ORAL
Qty: 90 TABLET | Refills: 0 | Status: SHIPPED | OUTPATIENT
Start: 2022-01-28 | End: 2022-05-25

## 2022-01-31 ENCOUNTER — PATIENT MESSAGE (OUTPATIENT)
Dept: INTERNAL MEDICINE | Facility: CLINIC | Age: 72
End: 2022-01-31

## 2022-01-31 ENCOUNTER — HOSPITAL ENCOUNTER (EMERGENCY)
Facility: HOSPITAL | Age: 72
Discharge: LEFT BEFORE DISCHARGE | End: 2022-01-31
Attending: EMERGENCY MEDICINE

## 2022-01-31 VITALS
SYSTOLIC BLOOD PRESSURE: 134 MMHG | RESPIRATION RATE: 18 BRPM | DIASTOLIC BLOOD PRESSURE: 75 MMHG | BODY MASS INDEX: 30.21 KG/M2 | OXYGEN SATURATION: 96 % | HEIGHT: 69 IN | TEMPERATURE: 98 F | HEART RATE: 70 BPM | WEIGHT: 204 LBS

## 2022-01-31 DIAGNOSIS — G47.33 OBSTRUCTIVE SLEEP APNEA SYNDROME: ICD-10-CM

## 2022-01-31 DIAGNOSIS — Z53.21 PATIENT LEFT WITHOUT BEING SEEN: Primary | ICD-10-CM

## 2022-01-31 DIAGNOSIS — R04.0 BLEEDING FROM THE NOSE: ICD-10-CM

## 2022-01-31 DIAGNOSIS — J43.8 OTHER EMPHYSEMA: Primary | ICD-10-CM

## 2022-01-31 PROCEDURE — 99211 OFF/OP EST MAY X REQ PHY/QHP: CPT | Performed by: EMERGENCY MEDICINE

## 2022-02-01 NOTE — TELEPHONE ENCOUNTER
From: Nery Garcia  To: Marilyn K. Vermeesch, MD  Sent: 1/31/2022 7:06 PM EST  Subject: Humidifier Request for O2 Machine    Mom has been to the ER this evening for a nosebleed that we could not stop. The Doc said she needs a humidifier for her oxygen machine. They told us to email you so that you could order it for her. Please let me know if you need more information. Thank you!    Rama Garcia

## 2022-02-01 NOTE — ED PROVIDER NOTES
Subjective   Patient level out being seen or evaluated.          Review of Systems   Constitutional:        Patient left without being seen or evaluated       Past Medical History:   Diagnosis Date   • Acute bronchitis    • Acute otitis media    • Anxiety    • Atrial fibrillation (HCC)    • Body piercing     EARS   • CAD (coronary artery disease)    • CAD (coronary artery disease)    • Cardiac insufficiency (HCC)    • Chest pain    • Colitis    • COPD (chronic obstructive pulmonary disease) (HCC)    • Dementia (HCC)     PATIENT REPORTS VERY EARLY STAGES OF THIS.  REPORTS CURRENTLY LIVES BY HERSELF.   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 BUT DOES NOT WEAR THEM ANYMORE   • Hepatitis     REPORTS AS A CHILD, UNSURE TYPE   • History of esophageal reflux    • History of obesity    • Hyperlipidemia    • Hypertension    • Iron deficiency anemia    • Obesity      TRUNKAL   • On home oxygen therapy     REPORTS SHE WEARS AT 1L [facility] HS   • Otitis externa    • Peptic ulcer disease     noted on EGD, November 2013   • Pneumonia    • Pulmonary embolus (HCC) 01/01/2012   • RLS (restless legs syndrome)    • Wears glasses    • Wears partial dentures     FULL UPPER PLATE AND PARTIAL LOWER PLATE       Allergies   Allergen Reactions   • Lisinopril Cough       Past Surgical History:   Procedure Laterality Date   • BREAST EXCISIONAL BIOPSY Bilateral     Patient unsure of dates-    • BREAST SURGERY Bilateral     lumpectomies, PATIENT REPORTS BENIGN AND THAT THERE ARE NO RESTRICTIONS ON BUE'S   • CATARACT EXTRACTION Bilateral    • COLONOSCOPY N/A 9/27/2017    Procedure: COLONOSCOPY WITH COLD BIOPSY POLYPECTOMY; BIOPSIES;  Surgeon: Severiano Calloway MD;  Location: [facility] ENDOSCOPY;  Service:    • CORONARY ANGIOPLASTY  10/01/2012    Nonobstructive coronary artery disease with normal  left ventricular function    • HYSTERECTOMY     • OOPHORECTOMY         Family History   Problem Relation Age of Onset   • Heart attack Mother    • Stroke Mother    • Heart attack Father    • Stroke Father    • Heart attack Brother    • Breast cancer Neg Hx    • Ovarian cancer Neg Hx    • Colon cancer Neg Hx        Social History     Socioeconomic History   • Marital status:    Tobacco Use   • Smoking status: Former Smoker     Packs/day: 3.00     Years: 35.00     Pack years: 105.00     Types: Cigarettes     Quit date:      Years since quittin.0   • Smokeless tobacco: Never Used   Vaping Use   • Vaping Use: Never used   Substance and Sexual Activity   • Alcohol use: Never   • Drug use: No   • Sexual activity: Defer           Objective   Physical Exam  Constitutional:       Comments: Patient left without being seen or evaluated         Procedures           ED Course                                                 MDM  Patient left without being seen or evaluated  Final diagnoses:   Patient left without being seen       ED Disposition  ED Disposition     ED Disposition Condition Comment    LWBS after Triage            No follow-up provider specified.       Medication List      No changes were made to your prescriptions during this visit.          Ronny Santamaria,   [id number]

## 2022-02-11 RX ORDER — LOSARTAN POTASSIUM 100 MG/1
TABLET ORAL
Qty: 90 TABLET | Refills: 0 | Status: SHIPPED | OUTPATIENT
Start: 2022-02-11 | End: 2022-05-23

## 2022-03-11 RX ORDER — HYDROCHLOROTHIAZIDE 12.5 MG/1
TABLET ORAL
Qty: 90 TABLET | Refills: 0 | Status: SHIPPED | OUTPATIENT
Start: 2022-03-11 | End: 2022-04-04

## 2022-04-04 RX ORDER — ATORVASTATIN CALCIUM 40 MG/1
TABLET, FILM COATED ORAL
Qty: 90 TABLET | Refills: 0 | Status: SHIPPED | OUTPATIENT
Start: 2022-04-04 | End: 2022-06-28

## 2022-04-04 RX ORDER — HYDROCHLOROTHIAZIDE 12.5 MG/1
TABLET ORAL
Qty: 90 TABLET | Refills: 0 | Status: SHIPPED | OUTPATIENT
Start: 2022-04-04 | End: 2022-09-09 | Stop reason: SDUPTHER

## 2022-05-23 RX ORDER — SOTALOL HYDROCHLORIDE 80 MG/1
TABLET ORAL
Qty: 90 TABLET | Refills: 1 | Status: SHIPPED | OUTPATIENT
Start: 2022-05-23 | End: 2022-07-20 | Stop reason: SDUPTHER

## 2022-05-23 RX ORDER — LOSARTAN POTASSIUM 100 MG/1
TABLET ORAL
Qty: 90 TABLET | Refills: 0 | Status: SHIPPED | OUTPATIENT
Start: 2022-05-23 | End: 2022-07-20 | Stop reason: SDUPTHER

## 2022-05-23 NOTE — TELEPHONE ENCOUNTER
appt 9/2022  Lab Results   Component Value Date    GLUCOSE 106 (H) 11/01/2021    BUN 20 11/01/2021    CREATININE 1.04 (H) 11/01/2021    EGFRIFNONA 53 (L) 11/01/2021    EGFRIFAFRI 54 (L) 09/23/2021    BCR 19.2 11/01/2021    K 3.8 11/01/2021    CO2 28.4 11/01/2021    CALCIUM 9.4 11/01/2021    PROTENTOTREF 6.3 09/23/2021    ALBUMIN 4.20 11/01/2021    LABIL2 2.0 09/23/2021    AST 18 11/01/2021    ALT 23 11/01/2021

## 2022-05-25 ENCOUNTER — OFFICE VISIT (OUTPATIENT)
Dept: INTERNAL MEDICINE | Facility: CLINIC | Age: 72
End: 2022-05-25

## 2022-05-25 VITALS
TEMPERATURE: 97.1 F | BODY MASS INDEX: 30.21 KG/M2 | HEIGHT: 69 IN | WEIGHT: 204 LBS | OXYGEN SATURATION: 95 % | DIASTOLIC BLOOD PRESSURE: 60 MMHG | SYSTOLIC BLOOD PRESSURE: 120 MMHG | HEART RATE: 62 BPM

## 2022-05-25 DIAGNOSIS — R51.9 CHRONIC DAILY HEADACHE: Primary | ICD-10-CM

## 2022-05-25 PROBLEM — G44.209 TENSION TYPE HEADACHE: Status: RESOLVED | Noted: 2021-10-25 | Resolved: 2022-05-25

## 2022-05-25 LAB
CRP SERPL-MCNC: <0.3 MG/DL (ref 0–0.5)
ERYTHROCYTE [SEDIMENTATION RATE] IN BLOOD BY WESTERGREN METHOD: 2 MM/HR (ref 0–30)

## 2022-05-25 PROCEDURE — 99214 OFFICE O/P EST MOD 30 MIN: CPT | Performed by: INTERNAL MEDICINE

## 2022-05-25 PROCEDURE — 91305 COVID-19 (PFIZER) 12+ YRS: CPT | Performed by: INTERNAL MEDICINE

## 2022-05-25 PROCEDURE — 0052A COVID-19 (PFIZER) 12+ YRS: CPT | Performed by: INTERNAL MEDICINE

## 2022-05-25 RX ORDER — MIRTAZAPINE 15 MG/1
7.5 TABLET, FILM COATED ORAL NIGHTLY
Qty: 90 TABLET | Refills: 1
Start: 2022-05-25 | End: 2022-06-28

## 2022-05-25 RX ORDER — FLUOXETINE 10 MG/1
10 CAPSULE ORAL DAILY
Qty: 30 CAPSULE | Refills: 1 | Status: SHIPPED | OUTPATIENT
Start: 2022-05-25 | End: 2022-07-17 | Stop reason: SDUPTHER

## 2022-05-25 RX ORDER — PANTOPRAZOLE SODIUM 20 MG/1
20 TABLET, DELAYED RELEASE ORAL DAILY
Qty: 90 TABLET | Refills: 1 | Status: SHIPPED | OUTPATIENT
Start: 2022-05-25 | End: 2022-09-06

## 2022-05-25 NOTE — PROGRESS NOTES
"Chief Complaint   Patient presents with   • Abstract     Having daily headaches, OTC medications do not help. Pt has been complaining of ear pain.      Rudy Garcia is a 71 y.o. female.     Here today with complaints of daily HA.  I note a CT of head without contrast done due to fall in Nov 2021 was normal other than chronic white matter changes.    She has been taking OTC meds and these are not helpful for her HAs.   She does get nausea at times with HA.  No pain with chewing.  No complaints of changes in vision.  No change in gait.  She has been seeing neuro for her memory at .  Daughter believes memory is progressively worsening despite use of Aricept and Namenda.    She is also complaining of pain behind her ears.  She denies ST, cough or congestion.  No allergies.  She does take flonase prn and zyrtec regularly.       The following portions of the patient's history were reviewed and updated as appropriate: allergies, current medications, past family history, past medical history, past social history, past surgical history and problem list.    Review of Systems   HENT: Negative for congestion, postnasal drip and sore throat.         Pain behind ears bilaterally   Respiratory: Negative for cough.    Musculoskeletal: Positive for neck pain. Negative for gait problem.   Allergic/Immunologic: Negative for environmental allergies.   Neurological: Positive for headaches. Negative for dizziness, tremors, speech difficulty and light-headedness.   Psychiatric/Behavioral: Positive for confusion and dysphoric mood. Negative for sleep disturbance. The patient is not nervous/anxious.        Objective   /60   Pulse 62   Temp 97.1 °F (36.2 °C)   Ht 175.3 cm (69\")   Wt 92.5 kg (204 lb)   SpO2 95%   BMI 30.13 kg/m²   Body mass index is 30.13 kg/m².  Physical Exam  Vitals and nursing note reviewed.   Constitutional:       General: She is not in acute distress.     Appearance: Normal appearance. She is " well-developed. She is obese. She is not ill-appearing.      Comments: Kind and pleasant female, appears stated age and in NAD today   HENT:      Head: Normocephalic and atraumatic.      Right Ear: External ear normal.      Left Ear: External ear normal.   Eyes:      General:         Right eye: No discharge.         Left eye: No discharge.      Extraocular Movements: Extraocular movements intact.      Conjunctiva/sclera: Conjunctivae normal.      Pupils: Pupils are equal, round, and reactive to light.   Neck:      Thyroid: No thyromegaly.      Vascular: No carotid bruit.      Comments: No thyromegaly or mass  Cardiovascular:      Rate and Rhythm: Normal rate and regular rhythm.      Pulses: Normal pulses.      Heart sounds: Normal heart sounds. No murmur heard.  Pulmonary:      Effort: Pulmonary effort is normal. No respiratory distress.      Breath sounds: Normal breath sounds. No wheezing.   Musculoskeletal:         General: Normal range of motion.      Cervical back: Normal range of motion and neck supple.      Right lower leg: No edema.      Left lower leg: No edema.   Lymphadenopathy:      Cervical: No cervical adenopathy.   Skin:     General: Skin is warm.      Findings: No rash.   Neurological:      General: No focal deficit present.      Mental Status: She is alert. Mental status is at baseline.      Cranial Nerves: No cranial nerve deficit.      Motor: No weakness.      Coordination: Coordination normal.      Gait: Gait normal.      Comments: No pain over temporal arteries and no temporal bruit noted   Psychiatric:         Behavior: Behavior normal.         Thought Content: Thought content normal.      Comments: Flat affect noted, patient cannot answer most questions, her daughter answers majority of questions for her due to confusion         Assessment & Plan   Nery Garcia is here today and the following problems have been addressed:      Diagnoses and all orders for this visit:    1. Chronic daily  headache (Primary)  -     C-reactive Protein  -     Sedimentation Rate    Other orders  -     mirtazapine (REMERON) 15 MG tablet; Take 0.5 tablets by mouth Every Night.  Dispense: 90 tablet; Refill: 1  -     FLUoxetine (PROzac) 10 MG capsule; Take 1 capsule by mouth Daily.  Dispense: 30 capsule; Refill: 1  -     pantoprazole (PROTONIX) 20 MG EC tablet; Take 1 tablet by mouth Daily.  Dispense: 90 tablet; Refill: 1  -     COVID-19 Vaccine (Pfizer) Gray Cap    stop wellbutrin as this could be contributing to headache  Decrease remeron to 7.5 mg qhs-plan is to wean off of this medicine at next visit  Start prozac 10 mg every AM-plan to increase dose of this medication on next office visit  Decrease protonix to 20 mg daily-if patient has no reflux symptoms with decreased dose, consider weaning off medication at next office visit  COVID vaccine provided today  Daughter will talk to her sister and they will consider discussing discontinuing Namenda and Aricept as she does not feel medication is helping with memory any further.  Patient has not seen neurology in quite some time    RTC 6 weeks for medicare wellness    Please note that portions of this note were completed with a voice recognition program.  Efforts were made to edit dictation, but occasionally words are mistranscribed.

## 2022-05-26 NOTE — PROGRESS NOTES
Please tell daughter that inflammatory markers are in normal range.  Therefore her mother's headaches are not consistent with temporal arteritis.  I am most concerned that it may be due to one of her medications and we will continue to work with plan that we discussed at office visit.

## 2022-06-28 RX ORDER — MIRTAZAPINE 15 MG/1
TABLET, FILM COATED ORAL
Qty: 90 TABLET | Refills: 0 | Status: SHIPPED | OUTPATIENT
Start: 2022-06-28 | End: 2022-09-26

## 2022-06-28 RX ORDER — ATORVASTATIN CALCIUM 40 MG/1
TABLET, FILM COATED ORAL
Qty: 90 TABLET | Refills: 0 | Status: SHIPPED | OUTPATIENT
Start: 2022-06-28 | End: 2022-07-22 | Stop reason: SDUPTHER

## 2022-07-18 ENCOUNTER — OFFICE VISIT (OUTPATIENT)
Dept: INTERNAL MEDICINE | Facility: CLINIC | Age: 72
End: 2022-07-18

## 2022-07-18 VITALS
DIASTOLIC BLOOD PRESSURE: 64 MMHG | SYSTOLIC BLOOD PRESSURE: 126 MMHG | BODY MASS INDEX: 31.84 KG/M2 | WEIGHT: 215 LBS | OXYGEN SATURATION: 95 % | HEART RATE: 58 BPM | TEMPERATURE: 97.5 F | HEIGHT: 69 IN

## 2022-07-18 DIAGNOSIS — F03.90 DEMENTIA WITHOUT BEHAVIORAL DISTURBANCE, UNSPECIFIED DEMENTIA TYPE: ICD-10-CM

## 2022-07-18 DIAGNOSIS — I10 PRIMARY HYPERTENSION: ICD-10-CM

## 2022-07-18 DIAGNOSIS — E78.2 MIXED HYPERLIPIDEMIA: ICD-10-CM

## 2022-07-18 DIAGNOSIS — G47.33 OBSTRUCTIVE SLEEP APNEA SYNDROME: ICD-10-CM

## 2022-07-18 DIAGNOSIS — I48.0 PAROXYSMAL ATRIAL FIBRILLATION: ICD-10-CM

## 2022-07-18 DIAGNOSIS — R73.9 HYPERGLYCEMIA: ICD-10-CM

## 2022-07-18 DIAGNOSIS — K21.9 GASTROESOPHAGEAL REFLUX DISEASE WITHOUT ESOPHAGITIS: ICD-10-CM

## 2022-07-18 DIAGNOSIS — Z00.00 ENCOUNTER FOR MEDICARE ANNUAL WELLNESS EXAM: Primary | ICD-10-CM

## 2022-07-18 DIAGNOSIS — F32.0 CURRENT MILD EPISODE OF MAJOR DEPRESSIVE DISORDER WITHOUT PRIOR EPISODE: ICD-10-CM

## 2022-07-18 DIAGNOSIS — E66.09 CLASS 1 OBESITY DUE TO EXCESS CALORIES WITH SERIOUS COMORBIDITY AND BODY MASS INDEX (BMI) OF 31.0 TO 31.9 IN ADULT: ICD-10-CM

## 2022-07-18 PROBLEM — R06.02 SHORTNESS OF BREATH: Status: RESOLVED | Noted: 2021-09-23 | Resolved: 2022-07-18

## 2022-07-18 PROBLEM — E66.811 CLASS 1 OBESITY DUE TO EXCESS CALORIES WITH SERIOUS COMORBIDITY AND BODY MASS INDEX (BMI) OF 31.0 TO 31.9 IN ADULT: Status: ACTIVE | Noted: 2022-07-18

## 2022-07-18 PROBLEM — E66.811 CLASS 1 OBESITY DUE TO EXCESS CALORIES WITH SERIOUS COMORBIDITY AND BODY MASS INDEX (BMI) OF 30.0 TO 30.9 IN ADULT: Status: RESOLVED | Noted: 2021-07-12 | Resolved: 2022-07-18

## 2022-07-18 PROCEDURE — G0009 ADMIN PNEUMOCOCCAL VACCINE: HCPCS | Performed by: INTERNAL MEDICINE

## 2022-07-18 PROCEDURE — 1170F FXNL STATUS ASSESSED: CPT | Performed by: INTERNAL MEDICINE

## 2022-07-18 PROCEDURE — 1159F MED LIST DOCD IN RCRD: CPT | Performed by: INTERNAL MEDICINE

## 2022-07-18 PROCEDURE — 90677 PCV20 VACCINE IM: CPT | Performed by: INTERNAL MEDICINE

## 2022-07-18 PROCEDURE — G0439 PPPS, SUBSEQ VISIT: HCPCS | Performed by: INTERNAL MEDICINE

## 2022-07-18 RX ORDER — FLUOXETINE 10 MG/1
10 CAPSULE ORAL DAILY
Qty: 90 CAPSULE | Refills: 1 | Status: SHIPPED | OUTPATIENT
Start: 2022-07-18 | End: 2022-07-20 | Stop reason: SDUPTHER

## 2022-07-18 RX ORDER — DULOXETIN HYDROCHLORIDE 30 MG/1
30 CAPSULE, DELAYED RELEASE ORAL DAILY
Qty: 30 CAPSULE | Refills: 6
Start: 2022-07-18

## 2022-07-18 RX ORDER — FLUOXETINE 10 MG/1
10 CAPSULE ORAL DAILY
Qty: 90 CAPSULE | Refills: 1 | Status: SHIPPED | OUTPATIENT
Start: 2022-07-18 | End: 2022-07-18 | Stop reason: SDUPTHER

## 2022-07-18 RX ORDER — FLUOXETINE 10 MG/1
CAPSULE ORAL
Qty: 30 CAPSULE | Refills: 0 | OUTPATIENT
Start: 2022-07-18

## 2022-07-18 NOTE — PROGRESS NOTES
The ABCs of the Annual Wellness Visit  Subsequent Medicare Wellness Visit    Chief Complaint   Patient presents with   • Medicare Wellness-subsequent      Subjective    History of Present Illness:  Nery Garcia is a 71 y.o. female who presents for a Subsequent Medicare Wellness Visit.  PMH of HTN, HLD, CAD, carotid artery disease, A. fib, allergies, GERD, anxiety/depression, dementia, COPD, sleep apnea.  Patient was recently seen by neurologist due to worsening dementia.  Duloxetine was added to other medications and daughter has noted that her mood is improved and her HA has lessened.  Her ankles are swollen today, this comes and goes.  She denies any CP or SOA.  She is sleeping well at night.  No GERD sxs.  She uses oxygen at night.  She has decreased hearing in both ears, right worse than left.     The following portions of the patient's history were reviewed and   updated as appropriate: allergies, current medications, past family history, past medical history, past social history, past surgical history and problem list.    Compared to one year ago, the patient feels her physical   health is the same.    Compared to one year ago, the patient feels her mental   health is worse.    Recent Hospitalizations:  She was not admitted to the hospital during the last year.       Current Medical Providers:  Patient Care Team:  Vermeesch, Marilyn K, MD as PCP - General (Internal Medicine & Pediatrics)    Outpatient Medications Prior to Visit   Medication Sig Dispense Refill   • amLODIPine (NORVASC) 10 MG tablet Take 1 tablet by mouth Daily. 90 tablet 1   • apixaban (ELIQUIS) 5 MG tablet tablet Take 1 tablet by mouth Every 12 (Twelve) Hours. 60 tablet 10   • aspirin 81 MG tablet Take 81 mg by mouth Daily.     • atorvastatin (LIPITOR) 40 MG tablet Take 1 tablet by mouth once daily 90 tablet 0   • calcium-vitamin D (OSCAL 500/200 D-3) 500-200 MG-UNIT per tablet Take 1 tablet by mouth Daily.     • Cetirizine HCl (ZYRTEC PO) Take   by mouth.     • donepezil (Aricept) 10 MG tablet Take 1 tablet by mouth Every Night. 90 tablet 0   • DULoxetine (CYMBALTA) 30 MG capsule Take 1 capsule by mouth Daily. 30 capsule 6   • fluticasone (FLONASE) 50 MCG/ACT nasal spray 2 sprays into each nostril Daily As Needed for Rhinitis or Allergies.     • furosemide (LASIX) 20 MG tablet TAKE 1 TABLET BY MOUTH ON MONDAY, WEDNESDAY AND FRIDAY 36 tablet 2   • hydroCHLOROthiazide (HYDRODIURIL) 12.5 MG tablet Take 1 tablet by mouth once daily 90 tablet 0   • losartan (COZAAR) 100 MG tablet Take 1 tablet by mouth once daily 90 tablet 0   • memantine (NAMENDA) 10 MG tablet Take 10 mg by mouth 2 (Two) Times a Day.     • mirtazapine (REMERON) 15 MG tablet TAKE 1 TABLET BY MOUTH ONCE DAILY AT NIGHT 90 tablet 0   • Multiple Vitamin (MULTI-DAY VITAMINS) tablet Take 1 tablet by mouth daily.     • O2 (OXYGEN) Inhale 1 L/min Every Night.     • pantoprazole (PROTONIX) 20 MG EC tablet Take 1 tablet by mouth Daily. 90 tablet 1   • sotalol (BETAPACE) 80 MG tablet Take 1/2 (one-half) tablet by mouth twice daily 90 tablet 1   • vitamin B-12 (CYANOCOBALAMIN) 1000 MCG tablet Take 500 mcg by mouth Daily.     • FLUoxetine (PROzac) 10 MG capsule Take 1 capsule by mouth Daily. 90 capsule 1     No facility-administered medications prior to visit.       No opioid medication identified on active medication list. I have reviewed chart for other potential  high risk medication/s and harmful drug interactions in the elderly.          Aspirin is on active medication list. Aspirin use is indicated based on review of current medical condition/s. Pros and cons of this therapy have been discussed today. Benefits of this medication outweigh potential harm.  Patient has been encouraged to continue taking this medication.  .      Patient Active Problem List   Diagnosis   • Anxiety   • Dementia (HCC)   • Depression   • Hyperlipidemia   • Hypertension   • Obstructive sleep apnea syndrome   • Paroxysmal  "atrial fibrillation (HCC)   • Allergic rhinitis   • Chronic constipation   • CAD (coronary artery disease)   • COPD (chronic obstructive pulmonary disease) (HCC)   • GERD (gastroesophageal reflux disease)   • Carotid artery disease (HCC)   • Chronic fatigue   • Encounter for Medicare annual wellness exam   • Local edema   • Hyperglycemia   • Screening mammogram, encounter for   • Chronic bilateral low back pain without sciatica   • Chronic daily headache   • Class 1 obesity due to excess calories with serious comorbidity and body mass index (BMI) of 31.0 to 31.9 in adult     Advance Care Planning  Advance Directive is on file.  ACP discussion was held with the patient during this visit. Patient has an advance directive in EMR which is still valid.     Review of Systems   Constitutional: Negative.    HENT: Positive for hearing loss.    Eyes: Negative.    Respiratory: Negative.    Cardiovascular: Positive for leg swelling.   Endocrine: Negative.    Genitourinary: Negative.    Musculoskeletal: Negative.    Skin: Negative.    Allergic/Immunologic: Positive for environmental allergies.   Neurological: Positive for confusion.   Hematological: Bruises/bleeds easily.   Psychiatric/Behavioral: Positive for dysphoric mood.        Objective    Vitals:    07/18/22 1105   BP: 126/64   Pulse: 58   Temp: 97.5 °F (36.4 °C)   SpO2: 95%   Weight: 97.5 kg (215 lb)   Height: 175.3 cm (69\")   PainSc: 0-No pain     Estimated body mass index is 31.75 kg/m² as calculated from the following:    Height as of this encounter: 175.3 cm (69\").    Weight as of this encounter: 97.5 kg (215 lb).    BMI is >= 30 and <35. (Class 1 Obesity). The following options were offered after discussion;: exercise counseling/recommendations and nutrition counseling/recommendations      Does the patient have evidence of cognitive impairment? Yes    Physical Exam  Vitals and nursing note reviewed.   Constitutional:       General: She is not in acute distress.     " Appearance: Normal appearance. She is well-developed. She is obese. She is not ill-appearing.      Comments: Kind and pleasant female, appears stated age and in NAD today   HENT:      Head: Normocephalic and atraumatic.      Right Ear: Tympanic membrane, ear canal and external ear normal.      Left Ear: Tympanic membrane, ear canal and external ear normal.      Ears:      Comments: Left TM with small amount of blood over TM that appears to be due to trauma     Nose: No congestion.      Mouth/Throat:      Pharynx: No posterior oropharyngeal erythema.   Eyes:      General:         Right eye: No discharge.         Left eye: No discharge.      Extraocular Movements: Extraocular movements intact.      Conjunctiva/sclera: Conjunctivae normal.      Pupils: Pupils are equal, round, and reactive to light.   Neck:      Thyroid: No thyromegaly.      Vascular: No carotid bruit.      Comments: No thyromegaly or mass  Cardiovascular:      Rate and Rhythm: Normal rate and regular rhythm.      Pulses: Normal pulses.      Heart sounds: Normal heart sounds. No murmur heard.  Pulmonary:      Effort: Pulmonary effort is normal. No respiratory distress.      Breath sounds: Normal breath sounds. No wheezing.   Abdominal:      General: Bowel sounds are normal. There is no distension.      Palpations: Abdomen is soft.      Tenderness: There is no abdominal tenderness.   Musculoskeletal:         General: Normal range of motion.      Cervical back: Normal range of motion and neck supple.      Right lower leg: Edema present.      Left lower leg: Edema present.      Comments: +1 edema at ankles   Lymphadenopathy:      Cervical: No cervical adenopathy.   Skin:     General: Skin is warm.      Findings: No rash.   Neurological:      General: No focal deficit present.      Mental Status: She is alert. Mental status is at baseline.      Cranial Nerves: No cranial nerve deficit.      Motor: No weakness.      Coordination: Coordination normal.       Gait: Gait normal.   Psychiatric:         Mood and Affect: Mood normal.         Behavior: Behavior normal.      Comments: Patient is more alert and appears to be happier compared to last office visit, answers more questions appropriately                 HEALTH RISK ASSESSMENT    Smoking Status:  Social History     Tobacco Use   Smoking Status Former Smoker   • Packs/day: 3.00   • Years: 35.00   • Pack years: 105.00   • Types: Cigarettes   • Quit date:    • Years since quittin.5   Smokeless Tobacco Never Used     Alcohol Consumption:  Social History     Substance and Sexual Activity   Alcohol Use Never     Fall Risk Screen:    STEADI Fall Risk Assessment was completed, and patient is at MODERATE risk for falls. Assessment completed on:2022    Depression Screening:  PHQ-2/PHQ-9 Depression Screening 2022   Retired PHQ-9 Total Score -   Retired Total Score -   Little Interest or Pleasure in Doing Things 0-->not at all   Feeling Down, Depressed or Hopeless 0-->not at all   PHQ-9: Brief Depression Severity Measure Score 0       Health Habits and Functional and Cognitive Screening:  Functional & Cognitive Status 2022   Do you have difficulty preparing food and eating? Yes   Do you have difficulty bathing yourself, getting dressed or grooming yourself? No   Do you have difficulty using the toilet? No   Do you have difficulty moving around from place to place? No   Do you have trouble with steps or getting out of a bed or a chair? No   Current Diet Well Balanced Diet   Dental Exam Not up to date   Eye Exam Up to date   Exercise (times per week) 0 times per week   Current Exercises Include No Regular Exercise   Current Exercise Activities Include -   Do you need help using the phone?  No   Are you deaf or do you have serious difficulty hearing?  No   Do you need help with transportation? Yes   Do you need help shopping? Yes   Do you need help preparing meals?  Yes   Do you need help with housework?   Yes   Do you need help with laundry? Yes   Do you need help taking your medications? Yes   Do you need help managing money? Yes   Do you ever drive or ride in a car without wearing a seat belt? No   Have you felt unusual stress, anger or loneliness in the last month? Yes   Who do you live with? Alone   If you need help, do you have trouble finding someone available to you? No   Have you been bothered in the last four weeks by sexual problems? No   Do you have difficulty concentrating, remembering or making decisions? Yes       Age-appropriate Screening Schedule:  Refer to the list below for future screening recommendations based on patient's age, sex and/or medical conditions. Orders for these recommended tests are listed in the plan section. The patient has been provided with a written plan.    Health Maintenance   Topic Date Due   • ZOSTER VACCINE (1 of 2) Never done   • LIPID PANEL  11/12/2021   • DXA SCAN  07/18/2022 (Originally 7/26/2020)   • INFLUENZA VACCINE  10/01/2022   • MAMMOGRAM  09/02/2023   • PAP SMEAR  Discontinued   • TDAP/TD VACCINES  Discontinued              Assessment & Plan   CMS Preventative Services Quick Reference  Risk Factors Identified During Encounter  Dementia/Memory   Depression/Dysphoria  Hearing Problem  Immunizations Discussed/Encouraged (specific Immunizations; Prevnar 20 (Pneumococcal 20-valent conjugate) and Shingrix  Inactivity/Sedentary  Obesity/Overweight   The above risks/problems have been discussed with the patient.  Follow up actions/plans if indicated are seen below in the Assessment/Plan Section.  Pertinent information has been shared with the patient in the After Visit Summary.    Diagnoses and all orders for this visit:    1. Encounter for Medicare annual wellness exam (Primary)  -     CBC & Differential  -     Comprehensive Metabolic Panel  -     Hemoglobin A1c  -     Lipid Panel With / Chol / HDL Ratio  -     TSH    2. Primary hypertension  -     CBC & Differential  -      Comprehensive Metabolic Panel    3. Mixed hyperlipidemia  -     Comprehensive Metabolic Panel  -     Lipid Panel With / Chol / HDL Ratio    4. Paroxysmal atrial fibrillation (HCC)  -     CBC & Differential    5. Hyperglycemia  -     Comprehensive Metabolic Panel  -     Hemoglobin A1c    6. Class 1 obesity due to excess calories with serious comorbidity and body mass index (BMI) of 31.0 to 31.9 in adult  -     TSH    7. Gastroesophageal reflux disease without esophagitis    8. Dementia without behavioral disturbance, unspecified dementia type (formerly [facility])  -     TSH    9. Current mild episode of major depressive disorder without prior episode (formerly [facility])  -     TSH    10. Obstructive sleep apnea syndrome    Other orders  -     FLUoxetine (PROzac) 10 MG capsule; Take 1 capsule by mouth Daily.  Dispense: 90 capsule; Refill: 1  -     Pneumococcal Conjugate Vaccine 20-Valent (PCV20)    Follow heart healthy/low salt/low-cholesterol diet  Avoid processed foods  Monitor blood pressure as discussed  Exercise as tolerated up to 30 minutes 5 days per week  Take all medications as prescribed  Labs as noted  Currently in normal rhythm on exam today  Continue Eliquis and aspirin for paroxysmal atrial fibrillation  Continue Aricept and Namenda due to underlying dementia, currently stable  Recently seen by neurologist, duloxetine added to fluoxetine and Remeron, her mood appears to be improved  Continue oxygen for underlying sleep apnea  No GERD symptoms with use of Protonix  Continue atorvastatin every night for hyperlipidemia  Prevnar 20 vaccine provided today  Recommend shingles vaccine at local pharmacy  Continue calcium and vitamin D, last DEXA scan was normal, no family history of osteoporosis.  DEXA scan declined today      Follow Up:   Return in about 4 months (around 11/18/2022) for Next scheduled follow up.     An After Visit Summary and PPPS were made available to the patient.

## 2022-07-20 RX ORDER — LOSARTAN POTASSIUM 100 MG/1
100 TABLET ORAL DAILY
Qty: 90 TABLET | Refills: 1 | Status: SHIPPED | OUTPATIENT
Start: 2022-07-20 | End: 2022-07-22 | Stop reason: SDUPTHER

## 2022-07-20 RX ORDER — AMLODIPINE BESYLATE 10 MG/1
10 TABLET ORAL DAILY
Qty: 90 TABLET | Refills: 1 | Status: SHIPPED | OUTPATIENT
Start: 2022-07-20 | End: 2022-07-22 | Stop reason: SDUPTHER

## 2022-07-20 RX ORDER — SOTALOL HYDROCHLORIDE 80 MG/1
40 TABLET ORAL 2 TIMES DAILY
Qty: 90 TABLET | Refills: 1 | Status: SHIPPED | OUTPATIENT
Start: 2022-07-20 | End: 2022-12-19

## 2022-07-20 RX ORDER — FUROSEMIDE 20 MG/1
20 TABLET ORAL SEE ADMIN INSTRUCTIONS
Qty: 36 TABLET | Refills: 1 | Status: SHIPPED | OUTPATIENT
Start: 2022-07-20 | End: 2023-01-23

## 2022-07-20 RX ORDER — FLUOXETINE 10 MG/1
10 CAPSULE ORAL DAILY
Qty: 90 CAPSULE | Refills: 1 | Status: SHIPPED | OUTPATIENT
Start: 2022-07-20 | End: 2022-11-21 | Stop reason: SDUPTHER

## 2022-07-22 DIAGNOSIS — I10 PRIMARY HYPERTENSION: Primary | ICD-10-CM

## 2022-07-22 LAB
ALBUMIN SERPL-MCNC: 4 G/DL (ref 3.5–5.2)
ALBUMIN/GLOB SERPL: 2 G/DL
ALP SERPL-CCNC: 71 U/L (ref 39–117)
ALT SERPL-CCNC: 19 U/L (ref 1–33)
AST SERPL-CCNC: 16 U/L (ref 1–32)
BASOPHILS # BLD AUTO: 0.06 10*3/MM3 (ref 0–0.2)
BASOPHILS NFR BLD AUTO: 0.9 % (ref 0–1.5)
BILIRUB SERPL-MCNC: 0.3 MG/DL (ref 0–1.2)
BUN SERPL-MCNC: 10 MG/DL (ref 8–23)
BUN/CREAT SERPL: 11.4 (ref 7–25)
CALCIUM SERPL-MCNC: 9 MG/DL (ref 8.6–10.5)
CHLORIDE SERPL-SCNC: 104 MMOL/L (ref 98–107)
CHOLEST SERPL-MCNC: 155 MG/DL (ref 0–200)
CHOLEST/HDLC SERPL: 2.38 {RATIO}
CO2 SERPL-SCNC: 28.5 MMOL/L (ref 22–29)
CREAT SERPL-MCNC: 0.88 MG/DL (ref 0.57–1)
EGFRCR SERPLBLD CKD-EPI 2021: 70.8 ML/MIN/1.73
EOSINOPHIL # BLD AUTO: 0.36 10*3/MM3 (ref 0–0.4)
EOSINOPHIL NFR BLD AUTO: 5.4 % (ref 0.3–6.2)
ERYTHROCYTE [DISTWIDTH] IN BLOOD BY AUTOMATED COUNT: 12.4 % (ref 12.3–15.4)
GLOBULIN SER CALC-MCNC: 2 GM/DL
GLUCOSE SERPL-MCNC: 138 MG/DL (ref 65–99)
HBA1C MFR BLD: 5.8 % (ref 4.8–5.6)
HCT VFR BLD AUTO: 37.2 % (ref 34–46.6)
HDLC SERPL-MCNC: 65 MG/DL (ref 40–60)
HGB BLD-MCNC: 12 G/DL (ref 12–15.9)
IMM GRANULOCYTES # BLD AUTO: 0.01 10*3/MM3 (ref 0–0.05)
IMM GRANULOCYTES NFR BLD AUTO: 0.2 % (ref 0–0.5)
LDLC SERPL CALC-MCNC: 74 MG/DL (ref 0–100)
LYMPHOCYTES # BLD AUTO: 2.22 10*3/MM3 (ref 0.7–3.1)
LYMPHOCYTES NFR BLD AUTO: 33.5 % (ref 19.6–45.3)
MCH RBC QN AUTO: 29.8 PG (ref 26.6–33)
MCHC RBC AUTO-ENTMCNC: 32.3 G/DL (ref 31.5–35.7)
MCV RBC AUTO: 92.3 FL (ref 79–97)
MONOCYTES # BLD AUTO: 0.35 10*3/MM3 (ref 0.1–0.9)
MONOCYTES NFR BLD AUTO: 5.3 % (ref 5–12)
NEUTROPHILS # BLD AUTO: 3.63 10*3/MM3 (ref 1.7–7)
NEUTROPHILS NFR BLD AUTO: 54.7 % (ref 42.7–76)
NRBC BLD AUTO-RTO: 0 /100 WBC (ref 0–0.2)
PLATELET # BLD AUTO: 177 10*3/MM3 (ref 140–450)
POTASSIUM SERPL-SCNC: 3.6 MMOL/L (ref 3.5–5.2)
PROT SERPL-MCNC: 6 G/DL (ref 6–8.5)
RBC # BLD AUTO: 4.03 10*6/MM3 (ref 3.77–5.28)
SODIUM SERPL-SCNC: 144 MMOL/L (ref 136–145)
TRIGL SERPL-MCNC: 86 MG/DL (ref 0–150)
TSH SERPL DL<=0.005 MIU/L-ACNC: 2.05 UIU/ML (ref 0.27–4.2)
VLDLC SERPL CALC-MCNC: 16 MG/DL (ref 5–40)
WBC # BLD AUTO: 6.63 10*3/MM3 (ref 3.4–10.8)

## 2022-07-22 RX ORDER — AMLODIPINE BESYLATE 10 MG/1
10 TABLET ORAL DAILY
Qty: 90 TABLET | Refills: 1 | Status: SHIPPED | OUTPATIENT
Start: 2022-07-22 | End: 2022-09-26

## 2022-07-22 RX ORDER — LOSARTAN POTASSIUM 100 MG/1
100 TABLET ORAL DAILY
Qty: 90 TABLET | Refills: 1 | Status: SHIPPED | OUTPATIENT
Start: 2022-07-22 | End: 2022-11-21 | Stop reason: SDUPTHER

## 2022-07-22 RX ORDER — ATORVASTATIN CALCIUM 40 MG/1
40 TABLET, FILM COATED ORAL DAILY
Qty: 90 TABLET | Refills: 0 | Status: SHIPPED | OUTPATIENT
Start: 2022-07-22 | End: 2022-09-06

## 2022-09-05 DIAGNOSIS — I10 PRIMARY HYPERTENSION: ICD-10-CM

## 2022-09-06 RX ORDER — PANTOPRAZOLE SODIUM 20 MG/1
TABLET, DELAYED RELEASE ORAL
Qty: 90 TABLET | Refills: 1 | Status: SHIPPED | OUTPATIENT
Start: 2022-09-06 | End: 2022-11-21 | Stop reason: SDUPTHER

## 2022-09-06 RX ORDER — ATORVASTATIN CALCIUM 40 MG/1
TABLET, FILM COATED ORAL
Qty: 90 TABLET | Refills: 0 | Status: SHIPPED | OUTPATIENT
Start: 2022-09-06 | End: 2022-09-26

## 2022-09-07 ENCOUNTER — OFFICE VISIT (OUTPATIENT)
Dept: CARDIOLOGY | Facility: CLINIC | Age: 72
End: 2022-09-07

## 2022-09-07 VITALS
SYSTOLIC BLOOD PRESSURE: 118 MMHG | HEART RATE: 70 BPM | BODY MASS INDEX: 31.99 KG/M2 | HEIGHT: 69 IN | DIASTOLIC BLOOD PRESSURE: 60 MMHG | OXYGEN SATURATION: 94 % | WEIGHT: 216 LBS

## 2022-09-07 DIAGNOSIS — I48.0 PAROXYSMAL ATRIAL FIBRILLATION: Primary | ICD-10-CM

## 2022-09-07 DIAGNOSIS — E78.2 MIXED HYPERLIPIDEMIA: ICD-10-CM

## 2022-09-07 DIAGNOSIS — I10 PRIMARY HYPERTENSION: ICD-10-CM

## 2022-09-07 PROCEDURE — 93000 ELECTROCARDIOGRAM COMPLETE: CPT | Performed by: INTERNAL MEDICINE

## 2022-09-07 PROCEDURE — 99214 OFFICE O/P EST MOD 30 MIN: CPT | Performed by: INTERNAL MEDICINE

## 2022-09-07 NOTE — PROGRESS NOTES
[facility] Cardiology  Office Progress Note  Nery Garcia  1950  [address] [place] [address]       Visit Date: 09/07/22    PCP: Vermeesch, Marilyn K, MD  [address] [facility] [address]  [place] [address]    IDENTIFICATION: A 71 y.o. female  retired  from [employer]    PROBLEM LIST:   1. Carotid artery disease.  a. Carotid MRA, January 2012, revealing 50% to 75% right ICA stenosis.  b. April 2018, carotid duplex with 70% stenosis on the right, less than 50% on the left. Dr Jackson saldivar, pt deferred future fu   c. 4/19 CUS <70% bilat  d. 10/20 US carotids: Right ICA: 50-69%.  Left ICA: 0-49%.  2. Nonobstructive coronary artery disease with normal left ventricular function per cardiac catheterization in October 2012.  3. Atrial fibrillation.  a. Initial diagnosis in November 2010.  b. Previous Multaq therapy - currently on propafenone.  c. CHADS vasc 2   4. Pulmonary embolus, January 2012.  5. Hypertension - labile.  6. Dyslipidemia.  a. July 2014: Total cholesterol 195, triglycerides 110, HDL 64, .   b. 7/17/2019  TG 90 HDL 83 LDL 72  c. 11/20 , TG 77, HDL 66, LDL 76  7. Insulin resistance  1. 2/20 A1c 6.0  2. 11/20 A1c 5.9  3. 7/21 A1c 5.8  8. 'History of tobacco abuse with cessation in 2006.  9. Chronic obstructive pulmonary disease.  10. Gastroesophageal reflux disease.  11. Peptic ulcer disease noted on EGD, November 2013.  12. Restless leg syndrome.  13. Anxiety.  14. Fatty liver per evaluation, November 2013.  15. Dementia followed per Dr. Chandler at [facility]. 3 sisters with precocious dementia  16. Questionable sleep apnea.  17. Surgical history.  a. Hysterectomy.  b. Bilateral breast lumpectomies  c. Cataract extractions       CC:   Chief Complaint   Patient presents with   • Paroxysmal atrial fibrillation (CMS/HCC)       Allergies  Allergies   Allergen Reactions   • Lisinopril Cough       Current Medications    Current  Outpatient Medications:   •  amLODIPine (NORVASC) 10 MG tablet, Take 1 tablet by mouth Daily., Disp: 90 tablet, Rfl: 1  •  apixaban (ELIQUIS) 5 MG tablet tablet, Take 1 tablet by mouth Every 12 (Twelve) Hours., Disp: 180 tablet, Rfl: 1  •  aspirin 81 MG tablet, Take 81 mg by mouth Daily., Disp: , Rfl:   •  atorvastatin (LIPITOR) 40 MG tablet, TAKE 1 TABLET EVERY DAY, Disp: 90 tablet, Rfl: 0  •  calcium-vitamin D (OSCAL-500) 500-200 MG-UNIT per tablet, Take 1 tablet by mouth Daily., Disp: , Rfl:   •  Cetirizine HCl (ZYRTEC PO), Take  by mouth., Disp: , Rfl:   •  donepezil (Aricept) 10 MG tablet, Take 1 tablet by mouth Every Night., Disp: 90 tablet, Rfl: 0  •  DULoxetine (CYMBALTA) 30 MG capsule, Take 1 capsule by mouth Daily., Disp: 30 capsule, Rfl: 6  •  FLUoxetine (PROzac) 10 MG capsule, Take 1 capsule by mouth Daily., Disp: 90 capsule, Rfl: 1  •  fluticasone (FLONASE) 50 MCG/ACT nasal spray, 2 sprays into each nostril Daily As Needed for Rhinitis or Allergies., Disp: , Rfl:   •  furosemide (LASIX) 20 MG tablet, Take 1 tablet by mouth See Admin Instructions. Take 1 tablet by mouth on Monday, Wednesday, and Friday, Disp: 36 tablet, Rfl: 1  •  hydroCHLOROthiazide (HYDRODIURIL) 12.5 MG tablet, Take 1 tablet by mouth once daily, Disp: 90 tablet, Rfl: 0  •  losartan (COZAAR) 100 MG tablet, Take 1 tablet by mouth Daily., Disp: 90 tablet, Rfl: 1  •  memantine (NAMENDA) 10 MG tablet, Take 10 mg by mouth 2 (Two) Times a Day., Disp: , Rfl:   •  mirtazapine (REMERON) 15 MG tablet, TAKE 1 TABLET BY MOUTH ONCE DAILY AT NIGHT (Patient taking differently: 7.5 mg Every Night.), Disp: 90 tablet, Rfl: 0  •  Multiple Vitamin (MULTI-DAY VITAMINS) tablet, Take 1 tablet by mouth daily., Disp: , Rfl:   •  O2 (OXYGEN), Inhale 1 L/min Every Night., Disp: , Rfl:   •  pantoprazole (PROTONIX) 20 MG EC tablet, TAKE 1 TABLET EVERY DAY, Disp: 90 tablet, Rfl: 1  •  sotalol (BETAPACE) 80 MG tablet, Take 0.5 tablets by mouth 2 (Two) Times a Day.,  "Disp: 90 tablet, Rfl: 1  •  vitamin B-12 (CYANOCOBALAMIN) 1000 MCG tablet, Take 500 mcg by mouth Daily., Disp: , Rfl:       History of Present Illness   Nery Garcia is a 71 y.o. year old female here for follow up.    Accompanied by her daughter that lives next door to her.  Her  memory impairment is as expected worsening    OBJECTIVE:  Vitals:    09/07/22 1336   BP: 118/60   BP Location: Right arm   Patient Position: Sitting   Pulse: 70   SpO2: 94%   Weight: 98 kg (216 lb)   Height: 175.3 cm (69\")     Body mass index is 31.9 kg/m².    Constitutional:       Appearance: Healthy appearance. Not in distress.   Neck:      Vascular: No JVR. JVD normal.   Pulmonary:      Effort: Pulmonary effort is normal.      Breath sounds: Normal breath sounds. No wheezing. No rhonchi. No rales.   Chest:      Chest wall: Not tender to palpatation.   Cardiovascular:      PMI at left midclavicular line. Bradycardia present. Regular rhythm. Normal S1. Normal S2.      Murmurs: There is no murmur.      No gallop. No click. No rub.   Pulses:     Intact distal pulses.   Edema:     Peripheral edema absent.   Abdominal:      General: Bowel sounds are normal.      Palpations: Abdomen is soft.      Tenderness: There is no abdominal tenderness.   Musculoskeletal: Normal range of motion.         General: No tenderness. Skin:     General: Skin is warm and dry.   Neurological:      General: No focal deficit present.      Mental Status: Alert and oriented to person, place and time.         Diagnostic Data:    ECG 12 Lead    Date/Time: 9/7/2022 1:52 PM  Performed by: Mega Kendall MD  Authorized by: Mega Kendall MD   Previous ECG: no previous ECG available  Rhythm: sinus rhythm  BPM: 63  Conduction: right bundle branch block    Clinical impression: abnormal EKG              ASSESSMENT:   Diagnosis Plan   1. Paroxysmal atrial fibrillation (HCC)     2. Primary hypertension     3. Mixed hyperlipidemia         PLAN:  Paroxysmal A. Fib [id number] 3 " maintaining sinus mechanism on half dose sotalol, anticoagulated with apixaban    Hypertension controlled -amlodipine losartan HCTZ    Dyslipidemia controlled on statin therapy          Mega Kendall MD, FACC

## 2022-09-09 RX ORDER — HYDROCHLOROTHIAZIDE 12.5 MG/1
12.5 TABLET ORAL DAILY
Qty: 90 TABLET | Refills: 0 | Status: SHIPPED | OUTPATIENT
Start: 2022-09-09 | End: 2022-09-19 | Stop reason: SDUPTHER

## 2022-09-19 RX ORDER — HYDROCHLOROTHIAZIDE 12.5 MG/1
12.5 TABLET ORAL DAILY
Qty: 90 TABLET | Refills: 0 | Status: SHIPPED | OUTPATIENT
Start: 2022-09-19 | End: 2022-11-21 | Stop reason: SDUPTHER

## 2022-09-25 DIAGNOSIS — I10 PRIMARY HYPERTENSION: ICD-10-CM

## 2022-09-26 RX ORDER — AMLODIPINE BESYLATE 10 MG/1
TABLET ORAL
Qty: 90 TABLET | Refills: 0 | Status: SHIPPED | OUTPATIENT
Start: 2022-09-26 | End: 2022-11-21 | Stop reason: SDUPTHER

## 2022-09-26 RX ORDER — ATORVASTATIN CALCIUM 40 MG/1
TABLET, FILM COATED ORAL
Qty: 90 TABLET | Refills: 0 | Status: SHIPPED | OUTPATIENT
Start: 2022-09-26 | End: 2022-11-21 | Stop reason: SDUPTHER

## 2022-09-26 RX ORDER — MIRTAZAPINE 15 MG/1
TABLET, FILM COATED ORAL
Qty: 90 TABLET | Refills: 0 | Status: SHIPPED | OUTPATIENT
Start: 2022-09-26 | End: 2022-11-21 | Stop reason: SDUPTHER

## 2022-11-21 ENCOUNTER — OFFICE VISIT (OUTPATIENT)
Dept: INTERNAL MEDICINE | Facility: CLINIC | Age: 72
End: 2022-11-21

## 2022-11-21 VITALS
BODY MASS INDEX: 31.7 KG/M2 | DIASTOLIC BLOOD PRESSURE: 60 MMHG | HEART RATE: 80 BPM | TEMPERATURE: 97.7 F | SYSTOLIC BLOOD PRESSURE: 120 MMHG | OXYGEN SATURATION: 97 % | WEIGHT: 214 LBS | HEIGHT: 69 IN

## 2022-11-21 DIAGNOSIS — Z12.31 SCREENING MAMMOGRAM, ENCOUNTER FOR: ICD-10-CM

## 2022-11-21 DIAGNOSIS — G30.9 MODERATE ALZHEIMER'S DEMENTIA WITHOUT BEHAVIORAL DISTURBANCE, PSYCHOTIC DISTURBANCE, MOOD DISTURBANCE, OR ANXIETY, UNSPECIFIED TIMING OF DEMENTIA ONSET: ICD-10-CM

## 2022-11-21 DIAGNOSIS — E78.2 MIXED HYPERLIPIDEMIA: ICD-10-CM

## 2022-11-21 DIAGNOSIS — K21.9 GASTROESOPHAGEAL REFLUX DISEASE WITHOUT ESOPHAGITIS: ICD-10-CM

## 2022-11-21 DIAGNOSIS — R73.9 HYPERGLYCEMIA: ICD-10-CM

## 2022-11-21 DIAGNOSIS — F32.0 CURRENT MILD EPISODE OF MAJOR DEPRESSIVE DISORDER WITHOUT PRIOR EPISODE: ICD-10-CM

## 2022-11-21 DIAGNOSIS — I48.0 PAROXYSMAL ATRIAL FIBRILLATION: Primary | ICD-10-CM

## 2022-11-21 DIAGNOSIS — I10 PRIMARY HYPERTENSION: ICD-10-CM

## 2022-11-21 DIAGNOSIS — F02.B0 MODERATE ALZHEIMER'S DEMENTIA WITHOUT BEHAVIORAL DISTURBANCE, PSYCHOTIC DISTURBANCE, MOOD DISTURBANCE, OR ANXIETY, UNSPECIFIED TIMING OF DEMENTIA ONSET: ICD-10-CM

## 2022-11-21 DIAGNOSIS — I65.23 BILATERAL CAROTID ARTERY STENOSIS: ICD-10-CM

## 2022-11-21 PROCEDURE — G0008 ADMIN INFLUENZA VIRUS VAC: HCPCS | Performed by: INTERNAL MEDICINE

## 2022-11-21 PROCEDURE — 99214 OFFICE O/P EST MOD 30 MIN: CPT | Performed by: INTERNAL MEDICINE

## 2022-11-21 PROCEDURE — 90662 IIV NO PRSV INCREASED AG IM: CPT | Performed by: INTERNAL MEDICINE

## 2022-11-21 RX ORDER — LOSARTAN POTASSIUM 100 MG/1
100 TABLET ORAL DAILY
Qty: 90 TABLET | Refills: 1 | Status: SHIPPED | OUTPATIENT
Start: 2022-11-21 | End: 2023-03-21 | Stop reason: SDUPTHER

## 2022-11-21 RX ORDER — AMLODIPINE BESYLATE 10 MG/1
10 TABLET ORAL DAILY
Qty: 90 TABLET | Refills: 1 | Status: SHIPPED | OUTPATIENT
Start: 2022-11-21 | End: 2022-11-22 | Stop reason: SDUPTHER

## 2022-11-21 RX ORDER — FLUOXETINE 10 MG/1
10 CAPSULE ORAL DAILY
Qty: 90 CAPSULE | Refills: 1 | Status: SHIPPED | OUTPATIENT
Start: 2022-11-21 | End: 2022-11-22 | Stop reason: SDUPTHER

## 2022-11-21 RX ORDER — ATORVASTATIN CALCIUM 40 MG/1
40 TABLET, FILM COATED ORAL DAILY
Qty: 90 TABLET | Refills: 1 | Status: SHIPPED | OUTPATIENT
Start: 2022-11-21 | End: 2022-11-22 | Stop reason: SDUPTHER

## 2022-11-21 RX ORDER — PANTOPRAZOLE SODIUM 20 MG/1
20 TABLET, DELAYED RELEASE ORAL DAILY
Qty: 90 TABLET | Refills: 1 | Status: SHIPPED | OUTPATIENT
Start: 2022-11-21 | End: 2023-03-21 | Stop reason: SDUPTHER

## 2022-11-21 RX ORDER — MIRTAZAPINE 15 MG/1
15 TABLET, FILM COATED ORAL NIGHTLY
Qty: 90 TABLET | Refills: 1 | Status: SHIPPED | OUTPATIENT
Start: 2022-11-21 | End: 2022-11-22 | Stop reason: SDUPTHER

## 2022-11-21 RX ORDER — HYDROCHLOROTHIAZIDE 12.5 MG/1
12.5 TABLET ORAL DAILY
Qty: 90 TABLET | Refills: 1 | Status: SHIPPED | OUTPATIENT
Start: 2022-11-21 | End: 2022-12-01

## 2022-11-21 NOTE — PROGRESS NOTES
Chief Complaint   Patient presents with   • Follow-up     For anxiety, depression, GERD, demenita, HLD, and HTN. Would like to discuss lymph node pain behind her ears.     Rudy Garcia is a 72 y.o. female.     History of Present Illness  Here today for follow-up of HTN, HLD, atrial fibrillation/CAD, GERD/constipation, anxiety and depression, dementia, COPD/PAT and hyperglycemia.  HTN/HLD/A. Fib/CAD-BP and heart rate are well controlled today.  Patient denies any CP, SOA, palpitations.  She does have some edema at end of day.  GERD/constipation-GERD is well controlled with Protonix.   Anxiety/depression/dementia-currently on several antidepressants per neurologist.  Her mood has improved with addition of duloxetine.  Her daughter says mood is up and down.  Has been going to adult day care and this seems to help her, but she can be irritable.  COPD/PAT-  She uses oxygen at night, she sleeps well.  She is not on any inhalers.  She does not wheeze.  Hyperglycemia-A1c 5.8 approximately 4 months ago.  HCM-she has had 2 COVID vaccines but no recent booster.  No recent seasonal flu vaccine-she will get flu vaccine today.  Last mammogram September 2021.  She is having some pain behind her ears for past day.  She denies any allergies or URI sxs.  She has been going to [employer] [facility]  recently, no ill contacts she is aware of.        The following portions of the patient's history were reviewed and updated as appropriate: allergies, current medications, past family history, past medical history, past social history, past surgical history and problem list.    Review of Systems   Constitutional: Negative for activity change, appetite change and unexpected weight change.   HENT: Negative for ear pain, rhinorrhea and sore throat.    Eyes: Negative for visual disturbance.   Respiratory: Negative for shortness of breath.    Cardiovascular: Positive for leg swelling. Negative for chest pain and palpitations.  "  Gastrointestinal: Negative for abdominal pain.   Musculoskeletal: Negative for back pain.   Allergic/Immunologic: Negative for environmental allergies.   Neurological: Negative for headaches.   Hematological: Positive for adenopathy.   Psychiatric/Behavioral: Positive for confusion. Negative for dysphoric mood and sleep disturbance. The patient is not nervous/anxious.        Objective   /60   Pulse 80   Temp 97.7 °F (36.5 °C)   Ht 175.3 cm (69\")   Wt 97.1 kg (214 lb)   SpO2 97%   BMI 31.60 kg/m²   Body mass index is 31.6 kg/m².  Physical Exam  Vitals and nursing note reviewed.   Constitutional:       General: She is not in acute distress.     Appearance: Normal appearance. She is well-developed. She is obese. She is not ill-appearing.      Comments: Kind and pleasant female, appears stated age and in NAD today   HENT:      Head: Normocephalic and atraumatic.      Right Ear: Tympanic membrane and external ear normal.      Left Ear: Tympanic membrane and external ear normal.      Nose: No rhinorrhea.      Mouth/Throat:      Pharynx: No posterior oropharyngeal erythema.   Eyes:      General:         Right eye: No discharge.         Left eye: No discharge.      Extraocular Movements: Extraocular movements intact.      Conjunctiva/sclera: Conjunctivae normal.   Neck:      Thyroid: No thyromegaly.      Vascular: Carotid bruit present.      Comments: No thyromegaly or mass  1 cm tender lymph node noted behind right ear  Left carotid bruit noted  Cardiovascular:      Rate and Rhythm: Normal rate and regular rhythm.      Pulses: Normal pulses.      Heart sounds: Normal heart sounds. No murmur heard.  Pulmonary:      Effort: Pulmonary effort is normal. No respiratory distress.      Breath sounds: Normal breath sounds. No wheezing.   Abdominal:      General: Bowel sounds are normal. There is no distension.      Palpations: Abdomen is soft.      Tenderness: There is no abdominal tenderness.   Musculoskeletal:    "      General: Normal range of motion.      Cervical back: Normal range of motion and neck supple.      Right lower leg: No edema.      Left lower leg: No edema.   Lymphadenopathy:      Cervical: No cervical adenopathy.   Skin:     General: Skin is warm.      Findings: No rash.   Neurological:      General: No focal deficit present.      Mental Status: She is alert. Mental status is at baseline.      Cranial Nerves: No cranial nerve deficit.      Motor: No weakness.      Coordination: Coordination normal.      Gait: Gait normal.   Psychiatric:         Mood and Affect: Mood normal.         Behavior: Behavior normal.      Comments: Pleasantly confused, does not always remember answer to question and her daughter helps on occasion         Assessment & Plan   Nery Garcia is here today and the following problems have been addressed:      Diagnoses and all orders for this visit:    1. Paroxysmal atrial fibrillation (HCC) (Primary)    2. Primary hypertension  -     amLODIPine (NORVASC) 10 MG tablet; Take 1 tablet by mouth Daily.  Dispense: 90 tablet; Refill: 1  -     atorvastatin (LIPITOR) 40 MG tablet; Take 1 tablet by mouth Daily.  Dispense: 90 tablet; Refill: 1  -     losartan (COZAAR) 100 MG tablet; Take 1 tablet by mouth Daily.  Dispense: 90 tablet; Refill: 1    3. Mixed hyperlipidemia    4. Hyperglycemia    5. Gastroesophageal reflux disease without esophagitis    6. Current mild episode of major depressive disorder without prior episode ([facility])    7. Moderate Alzheimer's dementia without behavioral disturbance, psychotic disturbance, mood disturbance, or anxiety, unspecified timing of dementia onset ([facility])    8. Screening mammogram, encounter for  -     Mammo Screening Digital Tomosynthesis Bilateral With CAD; Future    9. Bilateral carotid artery stenosis  -     US Carotid Bilateral; Future    Other orders  -     FLUoxetine (PROzac) 10 MG capsule; Take 1 capsule by mouth Daily.  Dispense: 90 capsule; Refill: 1  -      hydroCHLOROthiazide (HYDRODIURIL) 12.5 MG tablet; Take 1 tablet by mouth Daily.  Dispense: 90 tablet; Refill: 1  -     mirtazapine (REMERON) 15 MG tablet; Take 1 tablet by mouth Every Night.  Dispense: 90 tablet; Refill: 1  -     pantoprazole (PROTONIX) 20 MG EC tablet; Take 1 tablet by mouth Daily.  Dispense: 90 tablet; Refill: 1  -     Fluzone High-Dose 65+yrs (0197-2072)        Follow heart healthy/low salt/low-cholesterol diet, avoid excessive amounts of sweets  Avoid processed foods  Monitor blood pressure and heart rate on occasion  Take all medications as prescribed  Continue atorvastatin for hyperlipidemia  No current GERD symptoms with use of Protonix  She remains on oxygen at night due to underlying COPD and sleep apnea  Recommend low-dose ibuprofen 400 mg twice daily with food for next few days due to lymph node behind right ear  Currently in normal sinus rhythm, continue Eliquis and sotalol  Her memory is currently stable on Aricept and Namenda  Her mood appears improved since addition of duloxetine to Remeron and Prozac per neurology  I note left carotid bruit on exam today, ultrasound of carotids has been ordered  Screening mammogram ordered today  Flu vaccine provided today    Return in about 4 months (around 3/21/2023) for Next scheduled follow up.      Marilyn K. Vermeesch, MD      Please note that portions of this note were completed with a voice recognition program.  Efforts were made to edit dictation, but occasionally words are mistranscribed.

## 2022-11-22 DIAGNOSIS — I10 PRIMARY HYPERTENSION: ICD-10-CM

## 2022-11-22 RX ORDER — ATORVASTATIN CALCIUM 40 MG/1
40 TABLET, FILM COATED ORAL DAILY
Qty: 90 TABLET | Refills: 1 | Status: SHIPPED | OUTPATIENT
Start: 2022-11-22 | End: 2023-03-21 | Stop reason: SDUPTHER

## 2022-11-22 RX ORDER — FLUOXETINE 10 MG/1
10 CAPSULE ORAL DAILY
Qty: 90 CAPSULE | Refills: 1 | Status: SHIPPED | OUTPATIENT
Start: 2022-11-22 | End: 2023-03-21 | Stop reason: SDUPTHER

## 2022-11-22 RX ORDER — AMLODIPINE BESYLATE 10 MG/1
10 TABLET ORAL DAILY
Qty: 90 TABLET | Refills: 1 | Status: SHIPPED | OUTPATIENT
Start: 2022-11-22 | End: 2023-03-21 | Stop reason: SDUPTHER

## 2022-11-22 RX ORDER — MIRTAZAPINE 15 MG/1
15 TABLET, FILM COATED ORAL NIGHTLY
Qty: 90 TABLET | Refills: 1 | Status: SHIPPED | OUTPATIENT
Start: 2022-11-22 | End: 2023-03-21 | Stop reason: SDUPTHER

## 2022-11-29 ENCOUNTER — HOSPITAL ENCOUNTER (OUTPATIENT)
Dept: ULTRASOUND IMAGING | Facility: HOSPITAL | Age: 72
Discharge: HOME | End: 2022-11-29
Admitting: INTERNAL MEDICINE

## 2022-11-29 DIAGNOSIS — I65.23 BILATERAL CAROTID ARTERY STENOSIS: ICD-10-CM

## 2022-11-29 PROCEDURE — 93880 EXTRACRANIAL BILAT STUDY: CPT

## 2022-12-01 DIAGNOSIS — I65.23 BILATERAL CAROTID ARTERY STENOSIS: Primary | ICD-10-CM

## 2022-12-01 RX ORDER — HYDROCHLOROTHIAZIDE 12.5 MG/1
TABLET ORAL
Qty: 90 TABLET | Refills: 1 | Status: SHIPPED | OUTPATIENT
Start: 2022-12-01

## 2022-12-16 ENCOUNTER — HOSPITAL ENCOUNTER (OUTPATIENT)
Dept: CT IMAGING | Facility: HOSPITAL | Age: 72
Discharge: HOME | End: 2022-12-16
Admitting: INTERNAL MEDICINE

## 2022-12-16 DIAGNOSIS — I65.23 BILATERAL CAROTID ARTERY STENOSIS: ICD-10-CM

## 2022-12-16 PROCEDURE — 70498 CT ANGIOGRAPHY NECK: CPT

## 2022-12-16 PROCEDURE — 25010000002 IOPAMIDOL 61 % SOLUTION: Performed by: INTERNAL MEDICINE

## 2022-12-16 RX ADMIN — IOPAMIDOL 100 ML: 612 INJECTION, SOLUTION INTRAVENOUS at 12:16

## 2022-12-16 NOTE — PROGRESS NOTES
Please let daughter know that CT angiogram of carotids reveals significant stenosis or narrowing of both carotid arteries but right side is worse than left.  It is important that she see vascular surgeon for follow-up of this.  Thank you

## 2022-12-19 RX ORDER — SOTALOL HYDROCHLORIDE 80 MG/1
TABLET ORAL
Qty: 90 TABLET | Refills: 3 | Status: SHIPPED | OUTPATIENT
Start: 2022-12-19

## 2023-01-23 RX ORDER — APIXABAN 5 MG/1
TABLET, FILM COATED ORAL
Qty: 180 TABLET | Refills: 2 | Status: SHIPPED | OUTPATIENT
Start: 2023-01-23

## 2023-01-23 RX ORDER — FUROSEMIDE 20 MG/1
TABLET ORAL
Qty: 36 TABLET | Refills: 1 | Status: SHIPPED | OUTPATIENT
Start: 2023-01-23

## 2023-01-23 NOTE — TELEPHONE ENCOUNTER
Lab Results   Component Value Date    GLUCOSE 138 (H) 07/22/2022    BUN 10 07/22/2022    CREATININE 0.88 07/22/2022    EGFRIFNONA 53 (L) 11/01/2021    EGFRIFAFRI 54 (L) 09/23/2021    BCR 11.4 07/22/2022    K 3.6 07/22/2022    CO2 28.5 07/22/2022    CALCIUM 9.0 07/22/2022    PROTENTOTREF 6.0 07/22/2022    ALBUMIN 4.00 07/22/2022    LABIL2 2.0 07/22/2022    AST 16 07/22/2022    ALT 19 07/22/2022     72 years old  97.1 kg

## 2023-02-14 ENCOUNTER — HOSPITAL ENCOUNTER (OUTPATIENT)
Dept: MAMMOGRAPHY | Facility: HOSPITAL | Age: 73
Discharge: HOME | End: 2023-02-14
Admitting: INTERNAL MEDICINE
Payer: MEDICARE

## 2023-02-14 DIAGNOSIS — Z12.31 SCREENING MAMMOGRAM, ENCOUNTER FOR: ICD-10-CM

## 2023-02-14 PROCEDURE — 77063 BREAST TOMOSYNTHESIS BI: CPT

## 2023-02-14 PROCEDURE — 77067 SCR MAMMO BI INCL CAD: CPT

## 2023-03-21 ENCOUNTER — OFFICE VISIT (OUTPATIENT)
Dept: INTERNAL MEDICINE | Facility: CLINIC | Age: 73
End: 2023-03-21
Payer: MEDICARE

## 2023-03-21 VITALS
BODY MASS INDEX: 32.58 KG/M2 | HEIGHT: 69 IN | HEART RATE: 78 BPM | WEIGHT: 220 LBS | OXYGEN SATURATION: 94 % | DIASTOLIC BLOOD PRESSURE: 60 MMHG | TEMPERATURE: 97.8 F | SYSTOLIC BLOOD PRESSURE: 120 MMHG

## 2023-03-21 DIAGNOSIS — E78.2 MIXED HYPERLIPIDEMIA: Primary | ICD-10-CM

## 2023-03-21 DIAGNOSIS — I10 PRIMARY HYPERTENSION: ICD-10-CM

## 2023-03-21 DIAGNOSIS — R73.9 HYPERGLYCEMIA: ICD-10-CM

## 2023-03-21 DIAGNOSIS — I48.0 PAROXYSMAL ATRIAL FIBRILLATION: ICD-10-CM

## 2023-03-21 PROCEDURE — 3078F DIAST BP <80 MM HG: CPT | Performed by: INTERNAL MEDICINE

## 2023-03-21 PROCEDURE — 1159F MED LIST DOCD IN RCRD: CPT | Performed by: INTERNAL MEDICINE

## 2023-03-21 PROCEDURE — 1160F RVW MEDS BY RX/DR IN RCRD: CPT | Performed by: INTERNAL MEDICINE

## 2023-03-21 PROCEDURE — 99214 OFFICE O/P EST MOD 30 MIN: CPT | Performed by: INTERNAL MEDICINE

## 2023-03-21 PROCEDURE — 3074F SYST BP LT 130 MM HG: CPT | Performed by: INTERNAL MEDICINE

## 2023-03-21 RX ORDER — PANTOPRAZOLE SODIUM 20 MG/1
20 TABLET, DELAYED RELEASE ORAL DAILY
Qty: 90 TABLET | Refills: 1 | Status: SHIPPED | OUTPATIENT
Start: 2023-03-21

## 2023-03-21 RX ORDER — ATORVASTATIN CALCIUM 40 MG/1
40 TABLET, FILM COATED ORAL DAILY
Qty: 90 TABLET | Refills: 1 | Status: SHIPPED | OUTPATIENT
Start: 2023-03-21

## 2023-03-21 RX ORDER — MIRTAZAPINE 15 MG/1
15 TABLET, FILM COATED ORAL NIGHTLY
Qty: 90 TABLET | Refills: 1 | Status: SHIPPED | OUTPATIENT
Start: 2023-03-21

## 2023-03-21 RX ORDER — LOSARTAN POTASSIUM 100 MG/1
100 TABLET ORAL DAILY
Qty: 90 TABLET | Refills: 1 | Status: SHIPPED | OUTPATIENT
Start: 2023-03-21

## 2023-03-21 RX ORDER — AMLODIPINE BESYLATE 10 MG/1
10 TABLET ORAL DAILY
Qty: 90 TABLET | Refills: 1 | Status: SHIPPED | OUTPATIENT
Start: 2023-03-21

## 2023-03-21 RX ORDER — FLUOXETINE 10 MG/1
10 CAPSULE ORAL DAILY
Qty: 90 CAPSULE | Refills: 1 | Status: SHIPPED | OUTPATIENT
Start: 2023-03-21

## 2023-03-21 NOTE — PROGRESS NOTES
Chief Complaint   Patient presents with   • Follow-up     4 months for anxiety, depression, dementia, HLD, and HTN.      Rudy Garcia is a 72 y.o. female.     History of Present Illness  Here today for follow-up of HTN, HLD, atrial fibrillation/CAD, GERD/constipation, anxiety and depression, dementia, COPD/PAT and hyperglycemia.  HTN/HLD/A. Fib/CAD-BP and heart rate are well controlled today.  Patient denies any CP, SOA, palpitations.  She does have some edema at end of day.  GERD/constipation-GERD is well controlled with Protonix.  No current constipation issues  Anxiety/depression/dementia-currently on several antidepressants per neurologist.  She was last seen per [facility] in November.  No changes were made in antidepressants or medications for her memory.  She has discontinued going to adult  because she was not enjoying it.  Family has hired a sitter to be with her during the day, she takes her out for drives and this has been very helpful.  She does get irritable at times.    COPD/PAT-  She uses oxygen at night, she sleeps well.   Hyperglycemia-A1c 5.8 approximately 8 months ago.  She does not follow any specific type of diet.  HCM-she has had 2 COVID vaccines but no recent booster.  Seasonal flu vaccine and pneumococcal vaccines up-to-date.  No documented shingles vaccine.  Last mammogram February 2023.       The following portions of the patient's history were reviewed and updated as appropriate: allergies, current medications, past family history, past medical history, past social history, past surgical history and problem list.    Review of Systems   Constitutional: Negative for activity change, appetite change and unexpected weight change.   Eyes: Negative for visual disturbance.   Respiratory: Negative for shortness of breath.    Cardiovascular: Positive for leg swelling. Negative for chest pain and palpitations.   Gastrointestinal: Negative for abdominal pain and  "constipation.   Genitourinary: Negative for difficulty urinating.   Musculoskeletal: Negative for back pain.   Neurological: Negative for headaches.   Psychiatric/Behavioral: Positive for confusion. Negative for dysphoric mood and sleep disturbance. The patient is not nervous/anxious.        Objective   /60   Pulse 78   Temp 97.8 °F (36.6 °C)   Ht 175.3 cm (69\")   Wt 99.8 kg (220 lb)   SpO2 94%   BMI 32.49 kg/m²   Body mass index is 32.49 kg/m².  Physical Exam  Vitals and nursing note reviewed.   Constitutional:       General: She is not in acute distress.     Appearance: Normal appearance. She is well-developed. She is obese. She is not ill-appearing.      Comments: Kind and pleasant female, appears stated age and in NAD today   HENT:      Head: Normocephalic and atraumatic.      Right Ear: External ear normal.      Left Ear: External ear normal.   Eyes:      General:         Right eye: No discharge.         Left eye: No discharge.      Extraocular Movements: Extraocular movements intact.   Neck:      Thyroid: No thyromegaly.      Vascular: No carotid bruit.      Comments: No thyromegaly or mass  Cardiovascular:      Rate and Rhythm: Normal rate and regular rhythm.      Pulses: Normal pulses.      Heart sounds: Normal heart sounds. No murmur heard.  Pulmonary:      Effort: Pulmonary effort is normal. No respiratory distress.      Breath sounds: Normal breath sounds. No wheezing.   Abdominal:      General: Bowel sounds are normal. There is no distension.      Palpations: Abdomen is soft.      Tenderness: There is no abdominal tenderness.   Musculoskeletal:         General: Normal range of motion.      Cervical back: Normal range of motion and neck supple.      Right lower leg: No edema.      Left lower leg: No edema.   Lymphadenopathy:      Cervical: No cervical adenopathy.   Skin:     General: Skin is warm.      Findings: No rash.   Neurological:      General: No focal deficit present.      Mental " Status: She is alert. Mental status is at baseline.      Cranial Nerves: No cranial nerve deficit.      Motor: No weakness.      Coordination: Coordination normal.      Gait: Gait normal.   Psychiatric:         Mood and Affect: Mood normal.         Behavior: Behavior normal.         Assessment & Plan   Nery Garcia is here today and the following problems have been addressed:      Diagnoses and all orders for this visit:    1. Mixed hyperlipidemia (Primary)    2. Primary hypertension    3. Paroxysmal atrial fibrillation (HCC)    4. Hyperglycemia    Other orders  -     amLODIPine (NORVASC) 10 MG tablet; Take 1 tablet by mouth Daily.  Dispense: 90 tablet; Refill: 1  -     atorvastatin (LIPITOR) 40 MG tablet; Take 1 tablet by mouth Daily.  Dispense: 90 tablet; Refill: 1  -     FLUoxetine (PROzac) 10 MG capsule; Take 1 capsule by mouth Daily.  Dispense: 90 capsule; Refill: 1  -     losartan (COZAAR) 100 MG tablet; Take 1 tablet by mouth Daily.  Dispense: 90 tablet; Refill: 1  -     mirtazapine (REMERON) 15 MG tablet; Take 1 tablet by mouth Every Night.  Dispense: 90 tablet; Refill: 1  -     pantoprazole (PROTONIX) 20 MG EC tablet; Take 1 tablet by mouth Daily.  Dispense: 90 tablet; Refill: 1        Follow heart healthy/low salt/low-cholesterol diet, avoid excessive amounts of sweets  Avoid processed foods  Monitor blood pressure on occasion  Exercise as tolerated with chair exercises, patient has been more active as she has a daytime sitter and is getting more activity, getting out more during the day  Take all medications as prescribed  Continue all current antidepressants, neurology added duloxetine to Prozac and Remeron.  Mood has improved on all antidepressants  Continue atorvastatin for hyperlipidemia  No GERD symptoms with use of Protonix  Memory is stable with Aricept and Namenda  Continue oxygen every night  Labs due on next office visit with Medicare wellness  Patient seen by vascular surgeon, per daughter she  is not a candidate for CEA surgery due to increased risk    Return in about 4 months (around 7/21/2023) for Medicare Wellness with Dr Barrios.      Marilyn K. Vermeesch, MD      Part of this note may be an electronic transcription/translation of spoken language to printed text using the Dragon Dictation System.

## 2023-04-07 ENCOUNTER — HOSPITAL ENCOUNTER (EMERGENCY)
Facility: HOSPITAL | Age: 73
Discharge: HOME | End: 2023-04-07
Attending: EMERGENCY MEDICINE | Admitting: EMERGENCY MEDICINE
Payer: MEDICARE

## 2023-04-07 ENCOUNTER — APPOINTMENT (OUTPATIENT)
Dept: GENERAL RADIOLOGY | Facility: HOSPITAL | Age: 73
End: 2023-04-07
Payer: MEDICARE

## 2023-04-07 ENCOUNTER — APPOINTMENT (OUTPATIENT)
Dept: CT IMAGING | Facility: HOSPITAL | Age: 73
End: 2023-04-07
Payer: MEDICARE

## 2023-04-07 VITALS
TEMPERATURE: 97.9 F | OXYGEN SATURATION: 96 % | HEIGHT: 69 IN | SYSTOLIC BLOOD PRESSURE: 127 MMHG | BODY MASS INDEX: 32.58 KG/M2 | HEART RATE: 75 BPM | RESPIRATION RATE: 16 BRPM | WEIGHT: 220 LBS | DIASTOLIC BLOOD PRESSURE: 55 MMHG

## 2023-04-07 DIAGNOSIS — R51.9 NONINTRACTABLE HEADACHE, UNSPECIFIED CHRONICITY PATTERN, UNSPECIFIED HEADACHE TYPE: ICD-10-CM

## 2023-04-07 DIAGNOSIS — J44.1 COPD EXACERBATION: Primary | ICD-10-CM

## 2023-04-07 LAB
ALBUMIN SERPL-MCNC: 4 G/DL (ref 3.5–5.2)
ALBUMIN/GLOB SERPL: 1.5 G/DL
ALP SERPL-CCNC: 76 U/L (ref 39–117)
ALT SERPL W P-5'-P-CCNC: 19 U/L (ref 1–33)
ANION GAP SERPL CALCULATED.3IONS-SCNC: 9.5 MMOL/L (ref 5–15)
AST SERPL-CCNC: 19 U/L (ref 1–32)
BASOPHILS # BLD AUTO: 0.06 10*3/MM3 (ref 0–0.2)
BASOPHILS NFR BLD AUTO: 0.7 % (ref 0–1.5)
BILIRUB SERPL-MCNC: 0.3 MG/DL (ref 0–1.2)
BUN SERPL-MCNC: 17 MG/DL (ref 8–23)
BUN/CREAT SERPL: 17 (ref 7–25)
CALCIUM SPEC-SCNC: 9.2 MG/DL (ref 8.6–10.5)
CHLORIDE SERPL-SCNC: 103 MMOL/L (ref 98–107)
CO2 SERPL-SCNC: 29.5 MMOL/L (ref 22–29)
CREAT SERPL-MCNC: 1 MG/DL (ref 0.57–1)
DEPRECATED RDW RBC AUTO: 43.7 FL (ref 37–54)
EGFRCR SERPLBLD CKD-EPI 2021: 60.4 ML/MIN/1.73
EOSINOPHIL # BLD AUTO: 0.3 10*3/MM3 (ref 0–0.4)
EOSINOPHIL NFR BLD AUTO: 3.3 % (ref 0.3–6.2)
ERYTHROCYTE [DISTWIDTH] IN BLOOD BY AUTOMATED COUNT: 13.1 % (ref 12.3–15.4)
GLOBULIN UR ELPH-MCNC: 2.7 GM/DL
GLUCOSE SERPL-MCNC: 130 MG/DL (ref 65–99)
HCT VFR BLD AUTO: 40.5 % (ref 34–46.6)
HGB BLD-MCNC: 13 G/DL (ref 12–15.9)
IMM GRANULOCYTES # BLD AUTO: 0.03 10*3/MM3 (ref 0–0.05)
IMM GRANULOCYTES NFR BLD AUTO: 0.3 % (ref 0–0.5)
LYMPHOCYTES # BLD AUTO: 2.98 10*3/MM3 (ref 0.7–3.1)
LYMPHOCYTES NFR BLD AUTO: 32.7 % (ref 19.6–45.3)
MCH RBC QN AUTO: 29 PG (ref 26.6–33)
MCHC RBC AUTO-ENTMCNC: 32.1 G/DL (ref 31.5–35.7)
MCV RBC AUTO: 90.4 FL (ref 79–97)
MONOCYTES # BLD AUTO: 0.58 10*3/MM3 (ref 0.1–0.9)
MONOCYTES NFR BLD AUTO: 6.4 % (ref 5–12)
NEUTROPHILS NFR BLD AUTO: 5.15 10*3/MM3 (ref 1.7–7)
NEUTROPHILS NFR BLD AUTO: 56.6 % (ref 42.7–76)
NRBC BLD AUTO-RTO: 0 /100 WBC (ref 0–0.2)
PLATELET # BLD AUTO: 178 10*3/MM3 (ref 140–450)
PMV BLD AUTO: 10.2 FL (ref 6–12)
POTASSIUM SERPL-SCNC: 4.1 MMOL/L (ref 3.5–5.2)
PROT SERPL-MCNC: 6.7 G/DL (ref 6–8.5)
RBC # BLD AUTO: 4.48 10*6/MM3 (ref 3.77–5.28)
SODIUM SERPL-SCNC: 142 MMOL/L (ref 136–145)
TROPONIN T SERPL HS-MCNC: 6 NG/L
WBC NRBC COR # BLD: 9.1 10*3/MM3 (ref 3.4–10.8)

## 2023-04-07 PROCEDURE — 85025 COMPLETE CBC W/AUTO DIFF WBC: CPT

## 2023-04-07 PROCEDURE — 80053 COMPREHEN METABOLIC PANEL: CPT

## 2023-04-07 PROCEDURE — 93005 ELECTROCARDIOGRAM TRACING: CPT

## 2023-04-07 PROCEDURE — 70450 CT HEAD/BRAIN W/O DYE: CPT

## 2023-04-07 PROCEDURE — 25010000002 DIPHENHYDRAMINE PER 50 MG

## 2023-04-07 PROCEDURE — 94799 UNLISTED PULMONARY SVC/PX: CPT

## 2023-04-07 PROCEDURE — 94761 N-INVAS EAR/PLS OXIMETRY MLT: CPT

## 2023-04-07 PROCEDURE — 99284 EMERGENCY DEPT VISIT MOD MDM: CPT

## 2023-04-07 PROCEDURE — 84484 ASSAY OF TROPONIN QUANT: CPT

## 2023-04-07 PROCEDURE — 96375 TX/PRO/DX INJ NEW DRUG ADDON: CPT

## 2023-04-07 PROCEDURE — 94640 AIRWAY INHALATION TREATMENT: CPT

## 2023-04-07 PROCEDURE — 96374 THER/PROPH/DIAG INJ IV PUSH: CPT

## 2023-04-07 PROCEDURE — 25010000002 METOCLOPRAMIDE PER 10 MG

## 2023-04-07 PROCEDURE — 71045 X-RAY EXAM CHEST 1 VIEW: CPT

## 2023-04-07 PROCEDURE — 25010000002 METHYLPREDNISOLONE PER 125 MG

## 2023-04-07 RX ORDER — ALBUTEROL SULFATE 90 UG/1
2 AEROSOL, METERED RESPIRATORY (INHALATION) EVERY 6 HOURS PRN
Qty: 8 G | Refills: 0 | Status: SHIPPED | OUTPATIENT
Start: 2023-04-07

## 2023-04-07 RX ORDER — METOCLOPRAMIDE HYDROCHLORIDE 5 MG/ML
5 INJECTION INTRAMUSCULAR; INTRAVENOUS ONCE
Status: COMPLETED | OUTPATIENT
Start: 2023-04-07 | End: 2023-04-07

## 2023-04-07 RX ORDER — ACETAMINOPHEN 325 MG/1
975 TABLET ORAL ONCE
Status: COMPLETED | OUTPATIENT
Start: 2023-04-07 | End: 2023-04-07

## 2023-04-07 RX ORDER — METHYLPREDNISOLONE SODIUM SUCCINATE 125 MG/2ML
125 INJECTION, POWDER, LYOPHILIZED, FOR SOLUTION INTRAMUSCULAR; INTRAVENOUS ONCE
Status: COMPLETED | OUTPATIENT
Start: 2023-04-07 | End: 2023-04-07

## 2023-04-07 RX ORDER — IPRATROPIUM BROMIDE AND ALBUTEROL SULFATE 2.5; .5 MG/3ML; MG/3ML
3 SOLUTION RESPIRATORY (INHALATION) ONCE
Status: COMPLETED | OUTPATIENT
Start: 2023-04-07 | End: 2023-04-07

## 2023-04-07 RX ORDER — PREDNISONE 20 MG/1
20 TABLET ORAL 2 TIMES DAILY
Qty: 10 TABLET | Refills: 0 | Status: SHIPPED | OUTPATIENT
Start: 2023-04-07 | End: 2023-04-13

## 2023-04-07 RX ORDER — DIPHENHYDRAMINE HYDROCHLORIDE 50 MG/ML
25 INJECTION INTRAMUSCULAR; INTRAVENOUS ONCE
Status: COMPLETED | OUTPATIENT
Start: 2023-04-07 | End: 2023-04-07

## 2023-04-07 RX ADMIN — ACETAMINOPHEN 975 MG: 325 TABLET, FILM COATED ORAL at 16:04

## 2023-04-07 RX ADMIN — DIPHENHYDRAMINE HYDROCHLORIDE 25 MG: 50 INJECTION, SOLUTION INTRAMUSCULAR; INTRAVENOUS at 16:10

## 2023-04-07 RX ADMIN — IPRATROPIUM BROMIDE AND ALBUTEROL SULFATE 3 ML: .5; 3 SOLUTION RESPIRATORY (INHALATION) at 17:19

## 2023-04-07 RX ADMIN — METOCLOPRAMIDE 5 MG: 5 INJECTION, SOLUTION INTRAMUSCULAR; INTRAVENOUS at 16:08

## 2023-04-07 RX ADMIN — METHYLPREDNISOLONE SODIUM SUCCINATE 125 MG: 125 INJECTION, POWDER, FOR SOLUTION INTRAMUSCULAR; INTRAVENOUS at 17:00

## 2023-04-07 NOTE — ED PROVIDER NOTES
Subjective  History of Present Illness:    72-year-old female history significant for atrial fibrillation on anticoagulation, coronary artery disease, cardiac insufficiency, chest pain, COPD, dementia, hyperlipidemia, hypertension, and pulmonary embolus presents emergency room today for chief complaint of headache and speech issue.  Daughter is present at bedside.  She provides much of the history.  Reports mother is at baseline at this time.  Reports that she had a questionable episode of slurred speech earlier, however resolved quickly and that the patient had just woke up and believes that she was extremely tired which caused the questionable episode of slurred speech however is at baseline at this time.  She complains that her headache is left-sided.  Describes it as low intensity and not the worst headache of her life, not thunderclap-like in nature.  Denies any visual disturbances.  No history of migraines.  She is anticoagulated with Eliquis.  No falls or trauma.  She does report an episode of lightheadedness earlier.  Denies any chest pain.  No medications given prior to arrival.  Daughter denies any unilateral weakness or facial droop.  Daughter does report that the patient does have some chronic shortness of air with her COPD.  Wears 2 L of oxygen at night but none throughout the day.      Nurses Notes reviewed and agree, including vitals, allergies, social history and prior medical history.     REVIEW OF SYSTEMS: All systems reviewed and not pertinent unless noted.  Review of Systems   Respiratory: Positive for shortness of breath.    Cardiovascular: Negative for chest pain and leg swelling.   Gastrointestinal: Negative for nausea and vomiting.   Neurological: Positive for speech difficulty, light-headedness and headaches.   All other systems reviewed and are negative.      Past Medical History:   Diagnosis Date   • Acute bronchitis    • Acute otitis media    • Anxiety    • Atrial fibrillation    • Body  piercing     EARS   • CAD (coronary artery disease)    • CAD (coronary artery disease)    • Cardiac insufficiency    • Chest pain    • Colitis    • COPD (chronic obstructive pulmonary disease)    • Dementia     PATIENT REPORTS VERY EARLY STAGES OF THIS.  REPORTS CURRENTLY LIVES BY HERSELF.   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 BUT DOES NOT WEAR THEM ANYMORE   • Hepatitis     REPORTS AS A CHILD, UNSURE TYPE   • History of esophageal reflux    • History of obesity    • Hyperlipidemia    • Hypertension    • Iron deficiency anemia    • Obesity      TRUNKAL   • On home oxygen therapy     REPORTS SHE WEARS AT 1L [facility]   • Otitis externa    • Peptic ulcer disease     noted on EGD, 2013   • Pneumonia    • Pulmonary embolus 2012   • RLS (restless legs syndrome)    • Wears glasses    • Wears partial dentures     FULL UPPER PLATE AND PARTIAL LOWER PLATE       Allergies:    Lisinopril      Past Surgical History:   Procedure Laterality Date   • BREAST EXCISIONAL BIOPSY Bilateral     Patient unsure of dates-    • BREAST SURGERY Bilateral     lumpectomies, PATIENT REPORTS BENIGN AND THAT THERE ARE NO RESTRICTIONS ON BUE'S   • CATARACT EXTRACTION Bilateral    • COLONOSCOPY N/A 2017    Procedure: COLONOSCOPY WITH COLD BIOPSY POLYPECTOMY; BIOPSIES;  Surgeon: Severiano Calloway MD;  Location: [facility] ENDOSCOPY;  Service:    • CORONARY ANGIOPLASTY  10/01/2012    Nonobstructive coronary artery disease with normal left ventricular function    • HYSTERECTOMY     • OOPHORECTOMY           Social History     Socioeconomic History   • Marital status:    Tobacco Use   • Smoking status: Former     Packs/day: 3.00     Years: 35.00     Pack years: 105.00     Types: Cigarettes     Quit date: 2005     Years since quittin.2   • Smokeless tobacco: Never  "  Vaping Use   • Vaping Use: Never used   Substance and Sexual Activity   • Alcohol use: Never   • Drug use: No   • Sexual activity: Defer         Family History   Problem Relation Age of Onset   • Heart attack Mother    • Stroke Mother    • Heart attack Father    • Stroke Father    • Heart attack Brother    • Breast cancer Neg Hx    • Ovarian cancer Neg Hx    • Colon cancer Neg Hx        Objective  Physical Exam:  /55   Pulse 75   Temp 97.9 °F (36.6 °C) (Oral)   Resp 16   Ht 175.3 cm (69\")   Wt 99.8 kg (220 lb)   SpO2 96%   BMI 32.49 kg/m²      Physical Exam  Vitals and nursing note reviewed.   Constitutional:       General: She is not in acute distress.     Appearance: Normal appearance. She is obese. She is not ill-appearing, toxic-appearing or diaphoretic.   HENT:      Head: Normocephalic and atraumatic.      Nose: Nose normal. No congestion or rhinorrhea.      Mouth/Throat:      Pharynx: Oropharynx is clear.   Eyes:      Extraocular Movements: Extraocular movements intact.      Conjunctiva/sclera: Conjunctivae normal.      Pupils: Pupils are equal, round, and reactive to light.   Cardiovascular:      Rate and Rhythm: Normal rate and regular rhythm.      Pulses: Normal pulses.      Heart sounds: Normal heart sounds.   Pulmonary:      Effort: Pulmonary effort is normal. No respiratory distress.      Breath sounds: Normal breath sounds. No stridor. No wheezing, rhonchi or rales.   Abdominal:      General: There is no distension.      Palpations: Abdomen is soft.      Tenderness: There is no abdominal tenderness. There is no guarding.   Musculoskeletal:         General: Normal range of motion.      Cervical back: Normal range of motion and neck supple. No rigidity or tenderness.   Skin:     General: Skin is warm and dry.      Capillary Refill: Capillary refill takes less than 2 seconds.   Neurological:      General: No focal deficit present.      Mental Status: She is alert and oriented to person, " place, and time.      Cranial Nerves: No cranial nerve deficit.      Sensory: No sensory deficit.      Motor: No weakness.      Coordination: Coordination normal.   Psychiatric:         Mood and Affect: Mood normal.         Behavior: Behavior normal.         Thought Content: Thought content normal.         Judgment: Judgment normal.             Procedures    ED Course:    ED Course as of 04/07/23 1747 Fri Apr 07, 2023   1616 EKG interpreted by me.  Sinus rhythm.  Rate of 67.  Right bundle branch block.  No obvious ST or T wave changes.  Abnormal EKG. [CG]      ED Course User Index  [CG] Ronny Santamaria DO       Lab Results (last 24 hours)     Procedure Component Value Units Date/Time    CBC Auto Differential [[id number]]  (Normal) Collected: 04/07/23 1604    Specimen: Blood Updated: 04/07/23 1610     WBC 9.10 10*3/mm3      RBC 4.48 10*6/mm3      Hemoglobin 13.0 g/dL      Hematocrit 40.5 %      MCV 90.4 fL      MCH 29.0 pg      MCHC 32.1 g/dL      RDW 13.1 %      RDW-SD 43.7 fl      MPV 10.2 fL      Platelets 178 10*3/mm3      Neutrophil % 56.6 %      Lymphocyte % 32.7 %      Monocyte % 6.4 %      Eosinophil % 3.3 %      Basophil % 0.7 %      Immature Grans % 0.3 %      Neutrophils, Absolute 5.15 10*3/mm3      Lymphocytes, Absolute 2.98 10*3/mm3      Monocytes, Absolute 0.58 10*3/mm3      Eosinophils, Absolute 0.30 10*3/mm3      Basophils, Absolute 0.06 10*3/mm3      Immature Grans, Absolute 0.03 10*3/mm3      nRBC 0.0 /100 WBC     Comprehensive Metabolic Panel [[id number]]  (Abnormal) Collected: 04/07/23 1604    Specimen: Blood Updated: 04/07/23 1626     Glucose 130 mg/dL      BUN 17 mg/dL      Creatinine 1.00 mg/dL      Sodium 142 mmol/L      Potassium 4.1 mmol/L      Chloride 103 mmol/L      CO2 29.5 mmol/L      Calcium 9.2 mg/dL      Total Protein 6.7 g/dL      Albumin 4.0 g/dL      ALT (SGPT) 19 U/L      AST (SGOT) 19 U/L      Alkaline Phosphatase 76 U/L      Total Bilirubin 0.3 mg/dL      Globulin 2.7 gm/dL       A/G Ratio 1.5 g/dL      BUN/Creatinine Ratio 17.0     Anion Gap 9.5 mmol/L      eGFR 60.4 mL/min/1.73     Narrative:      GFR Normal >60  Chronic Kidney Disease <60  Kidney Failure <15    The GFR formula is only valid for adults with stable renal function between ages 18 and 70.    High Sensitivity Troponin T [[id number]]  (Normal) Collected: 04/07/23 1604    Specimen: Blood Updated: 04/07/23 1628     HS Troponin T 6 ng/L     Narrative:      High Sensitive Troponin T Reference Range:  <10.0 ng/L- Negative Female for AMI  <15.0 ng/L- Negative Male for AMI  >=10 - Abnormal Female indicating possible myocardial injury.  >=15 - Abnormal Male indicating possible myocardial injury.   Clinicians would have to utilize clinical acumen, EKG, Troponin, and serial changes to determine if it is an Acute Myocardial Infarction or myocardial injury due to an underlying chronic condition.                CT Head Without Contrast    Result Date: 4/7/2023  PROCEDURE: CT HEAD WO CONTRAST-  HISTORY: headache, chronic  COMPARISON: 11/01/2021.  TECHNIQUE: Multiple axial CT images were performed from the foramen magnum to the vertex. Individualized dose reduction techniques using automated exposure control or adjustment of mA and/or kV according to the patient size were employed.  FINDINGS: There is moderate, age-appropriate generalized cerebral atrophy. There is moderate, bilateral small vessel ischemic disease. The ventricles are enlarged. There is no evidence of edema or hemorrhage. No masses are identified. No extra-axial fluid is seen. The paranasal sinuses are unremarkable.      Impression: Atrophy  without acute process.     This report was signed and finalized on 4/7/2023 4:33 PM by Erica Wiggins MD.    XR Chest 1 View    Result Date: 4/7/2023  PROCEDURE: XR CHEST 1 VW-  HISTORY: soa  COMPARISON: 09/23/2021.  FINDINGS: The heart is normal in size. The lungs are clear. The mediastinum is unremarkable. There is no pneumothorax.   There are no acute osseous abnormalities.      Impression: No acute cardiopulmonary process.    This report was signed and finalized on 4/7/2023 4:29 PM by Erica Wiggins MD.         MDM    Initial impression of presenting illness: 72-year-old female, presents emergency room today for evaluation of shortness of air, headache, and speech problem.    DDX: includes but is not limited to: Electrolyte disturbance, tension type headache, migraine-like headache, pneumonia, pneumothorax, ACS, COPD exacerbation    Believe PE would be extremely less likely given that patient is chronically anticoagulated and has faithfully taken her medications per daughter at bedside especially in the absence of any tachycardia or hypoxia, lateral leg swelling or pain.    Patient arrives hemodynamically stable, afebrile, nontachycardic, nonhypoxic, nontoxic-appearing with vitals interpreted by myself.     Pertinent features from physical exam: Neuro exam benign.  Patient displays equal upper and lower extremity strength.  Her cerebellar assessment is intact with adequate coordination of upper and lower extremities.  Cranial nerves II through XII grossly intact.  Pupils PEERLA.  EOMI.  Appears atraumatic.  Cardiac auscultation with regular rate and rhythm.  Lungs clear to auscultation bilaterally.  No hip instability or tenderness palpated.  Patient moves all extremities without any difficulty.  Abdominal exam without any rebound guarding or rigidity.  Speech is normal.  There is no facial droop.  Patient does exhibit some mild shortness of air, however daughter at bedside reports that she has a chronic history of shortness of air due to COPD and this is not much different from baseline.  Patient does display slight dyspnea.    Initial diagnostic plan: CT head without contrast, CBC, CMP, troponin, EKG, chest x-ray    Results from initial plan were reviewed and interpreted by me revealing troponin within normal limits.  CMP with a glucose of  130, CO2 of 29.5, otherwise unremarkable.  CBC unremarkable.  EKG with right bundle branch block, no obvious ST or T wave changes per attending interpretation.  Chest x-ray with no acute cardiopulmonary process per radiology interpretation.  I personally evaluated this plain film and concur with radiology interpretation.  No acute process per CT of the head without contrast per radiology interpretation.    Diagnostic information from other sources: Chart review of prior laboratory studies.    Interventions / Re-evaluation: Given Reglan, Benadryl, and Tylenol for headache.  Reports that her headache has eased off.  Will give 125 mg of IV Solu-Medrol and a DuoNeb for her shortness of air that is likely caused by COPD exacerbation.  After administration of above medications, patient's breathing was noted to improve significantly, she is feeling much better and ready for discharge home.    Results/clinical rationale were discussed with patient and daughter at bedside.  Discussed that given improvement after symptomatic treatment, that this is likely result of COPD exacerbation for the shortness of air that she mentioned.  Discussed strict return precautions with the patient and her daughter at bedside.  They are understanding and agreeable to the plan of care and following up with primary care closely or returning for any worsening symptoms.  Given patient's questionable episode of slurred speech with quick resolvement and normal neurological exam without any other neurological deficits, extremely less suspicious for etiology like TIA or stroke given that daughter reports that the questionable episode of slurred speech happened after she had just awoken.    Consultations/Discussion of results with other physicians: Discussed this case with my attending physician, Dr. Santamaria prior to discharge.    Disposition plan: Discharge home.  Sent oral steroids and albuterol inhaler to patient's pharmacy.  Discussed strict return  precautions.  Agreeable to follow-up with primary care.  -----    Final diagnoses:   COPD exacerbation   Nonintractable headache, unspecified chronicity pattern, unspecified headache type        Simone Rincon PA-C  04/07/23 [id number]

## 2023-04-13 ENCOUNTER — OFFICE VISIT (OUTPATIENT)
Dept: INTERNAL MEDICINE | Facility: CLINIC | Age: 73
End: 2023-04-13
Payer: MEDICARE

## 2023-04-13 VITALS
TEMPERATURE: 97.1 F | HEIGHT: 69 IN | HEART RATE: 58 BPM | OXYGEN SATURATION: 94 % | BODY MASS INDEX: 33.33 KG/M2 | DIASTOLIC BLOOD PRESSURE: 60 MMHG | SYSTOLIC BLOOD PRESSURE: 128 MMHG | WEIGHT: 225 LBS

## 2023-04-13 DIAGNOSIS — J43.8 OTHER EMPHYSEMA: Primary | ICD-10-CM

## 2023-04-13 DIAGNOSIS — I48.0 PAROXYSMAL ATRIAL FIBRILLATION: ICD-10-CM

## 2023-04-13 DIAGNOSIS — I10 PRIMARY HYPERTENSION: ICD-10-CM

## 2023-04-14 NOTE — PROGRESS NOTES
"Rudy Garcia is a 72 y.o. female    HPI is my first encounter with this 72-year-old female with a history of A-fib, coronary artery disease congestive heart failure has dementia and hypertension she is on anticoagulant therapy there was an episode when she appeared to be slightly confused with slurred speech which resolved reasonably quickly.  She is compliant with her medications has a sitter with her all the time daughter who lives close by helps her.  Patient has COPD and is on 2 L via nasal cannula has quit tobacco use about 20 years ago    The following portions of the patient's history were reviewed and updated as appropriate: allergies, current medications, past family history, past medical history, past social history, past surgical history, and problem list.     Review of Systems   Constitutional: Positive for fatigue. Negative for activity change, appetite change and fever.   HENT: Negative for congestion, ear discharge, ear pain and trouble swallowing.    Eyes: Negative for photophobia and visual disturbance.   Respiratory: Positive for cough and shortness of breath.    Cardiovascular: Negative for chest pain and palpitations.   Gastrointestinal: Negative for abdominal distention, abdominal pain, constipation, diarrhea, nausea and vomiting.   Endocrine: Negative.    Genitourinary: Negative for dysuria, hematuria and urgency.   Musculoskeletal: Negative for arthralgias, back pain, joint swelling and myalgias.   Skin: Negative for color change and rash.   Allergic/Immunologic: Negative.    Neurological: Negative for dizziness, weakness, light-headedness and headaches.   Hematological: Negative for adenopathy. Does not bruise/bleed easily.   Psychiatric/Behavioral: Positive for confusion, decreased concentration and sleep disturbance. Negative for agitation and dysphoric mood. The patient is not nervous/anxious.        Visit Vitals  /60   Pulse 58   Temp 97.1 °F (36.2 °C)   Ht 175.3 cm (69\") "   Wt 102 kg (225 lb)   SpO2 94%   BMI 33.23 kg/m²       Objective  Physical Exam  Constitutional:       General: She is not in acute distress.     Appearance: She is well-developed.   HENT:      Nose: Nose normal.   Eyes:      General: No scleral icterus.     Conjunctiva/sclera: Conjunctivae normal.   Neck:      Thyroid: No thyromegaly.      Trachea: No tracheal deviation.   Cardiovascular:      Rate and Rhythm: Normal rate and regular rhythm.      Heart sounds: No murmur heard.    No friction rub.   Pulmonary:      Effort: Pulmonary effort is normal. No respiratory distress.      Breath sounds: No wheezing or rales.   Abdominal:      General: There is no distension.      Palpations: Abdomen is soft. There is no mass.      Tenderness: There is no abdominal tenderness. There is no guarding.   Musculoskeletal:         General: Deformity present. Normal range of motion.   Lymphadenopathy:      Cervical: No cervical adenopathy.   Skin:     General: Skin is warm and dry.      Findings: No erythema or rash.   Neurological:      Mental Status: She is alert and oriented to person, place, and time.      Cranial Nerves: No cranial nerve deficit.      Coordination: Coordination normal.      Deep Tendon Reflexes: Reflexes are normal and symmetric.   Psychiatric:         Behavior: Behavior normal.         Diagnoses and all orders for this visit:    Other emphysema currently stable continue with home O2.  Albuterol via inhaler as needed O2 sat okay    Paroxysmal atrial fibrillation stable rate control okay on sotalol on anticoagulant therapy    Primary hypertension stable with current meds and low-salt diet    Dementia behaviorally stable on donepezil and Namenda.  Takes mirtazapine at nighttime

## 2023-05-31 ENCOUNTER — OFFICE VISIT (OUTPATIENT)
Dept: INTERNAL MEDICINE | Facility: CLINIC | Age: 73
End: 2023-05-31

## 2023-05-31 VITALS
TEMPERATURE: 96.9 F | HEART RATE: 66 BPM | SYSTOLIC BLOOD PRESSURE: 122 MMHG | OXYGEN SATURATION: 96 % | WEIGHT: 221 LBS | DIASTOLIC BLOOD PRESSURE: 66 MMHG | BODY MASS INDEX: 32.73 KG/M2 | HEIGHT: 69 IN

## 2023-05-31 DIAGNOSIS — K62.5 RECTAL BLEEDING: Primary | ICD-10-CM

## 2023-05-31 DIAGNOSIS — K59.04 CHRONIC IDIOPATHIC CONSTIPATION: ICD-10-CM

## 2023-05-31 DIAGNOSIS — K64.9 HEMORRHOIDS, UNSPECIFIED HEMORRHOID TYPE: ICD-10-CM

## 2023-05-31 PROCEDURE — 3074F SYST BP LT 130 MM HG: CPT | Performed by: NURSE PRACTITIONER

## 2023-05-31 PROCEDURE — 1159F MED LIST DOCD IN RCRD: CPT | Performed by: NURSE PRACTITIONER

## 2023-05-31 PROCEDURE — 99213 OFFICE O/P EST LOW 20 MIN: CPT | Performed by: NURSE PRACTITIONER

## 2023-05-31 PROCEDURE — 3078F DIAST BP <80 MM HG: CPT | Performed by: NURSE PRACTITIONER

## 2023-05-31 PROCEDURE — 1160F RVW MEDS BY RX/DR IN RCRD: CPT | Performed by: NURSE PRACTITIONER

## 2023-05-31 RX ORDER — PANTOPRAZOLE SODIUM 20 MG/1
TABLET, DELAYED RELEASE ORAL
COMMUNITY
Start: 2023-05-05

## 2023-05-31 RX ORDER — HYDROCORTISONE ACETATE 25 MG/1
25 SUPPOSITORY RECTAL 2 TIMES DAILY
Qty: 14 SUPPOSITORY | Refills: 0 | Status: SHIPPED | OUTPATIENT
Start: 2023-05-31 | End: 2023-06-07

## 2023-05-31 NOTE — PROGRESS NOTES
"  Office Visit      Patient Name: Nery Garcia  : 1950   MRN: [id number]   Care Team: Patient Care Team:  Jackson Guillory MD as PCP - General (Internal Medicine)    Chief Complaint  Constipation (Blood in the toilet)    Subjective     Subjective      Nery Garcia presents to [facility] for blood in the toilet.   Reports constipation and and bleeding per the rectum since this morning.    The daughter states the blood is bright red and on the toilet paper when she wipes.   She has had similar symptoms before but daughter can not recall the treatment.   Patient's daughter nor patient can not recall her last bowel movement.   Daughter states she is on Eliquis and takes ibuprofen PRN for back pain.   Denies recent trauma, body aches, chills, fevers, abdominal or flank pain., changes in diet, decrease appetite or fluid intake, rectal pain, and pruritis.   She had colonoscopy in 2017 showing internal hemorrhoids, she has also had colitis in the past.   She overall feels well.     Objective     Objective   Vital Signs:   /66   Pulse 66   Temp 96.9 °F (36.1 °C)   Ht 175.3 cm (69\")   Wt 100 kg (221 lb)   SpO2 96%   BMI 32.64 kg/m²     Physical Exam  Vitals and nursing note reviewed.   Constitutional:       Appearance: Normal appearance.   Cardiovascular:      Rate and Rhythm: Normal rate and regular rhythm.      Heart sounds: Normal heart sounds.   Pulmonary:      Effort: Pulmonary effort is normal.      Breath sounds: Normal breath sounds.   Abdominal:      General: Bowel sounds are normal. There is no distension.      Palpations: Abdomen is soft. There is no mass.      Tenderness: There is no abdominal tenderness. There is no right CVA tenderness or left CVA tenderness.   Genitourinary:     Rectum: External hemorrhoid present.      Comments: Dried blood noted on her rectum.   Skin:     General: Skin is warm and dry.   Neurological:      Mental Status: She is alert. "   Psychiatric:         Mood and Affect: Mood normal.         Behavior: Behavior normal.          Assessment / Plan      Assessment & Plan   Problem List Items Addressed This Visit    None  Visit Diagnoses     Rectal bleeding    -  Primary    Chronic idiopathic constipation        Hemorrhoids, unspecified hemorrhoid type        No red flags on exam, grossly benign with exception of hemorrhoids. Suspect irritation in internal hemorrhoid. Treat with anusol suppository BID x 7 days, sitz baths, and prevention of constipation. Recommend high fiber diet, increase water intake, and miralax as needed. Strict return precautions discussed.         Follow Up   Return if symptoms worsen or fail to improve.  Patient was given instructions and counseling regarding her condition or for health maintenance advice. Please see specific information pulled into the AVS if appropriate.     SHEILA Maciel  [facility] - [place]

## 2023-08-17 ENCOUNTER — OFFICE VISIT (OUTPATIENT)
Dept: INTERNAL MEDICINE | Facility: CLINIC | Age: 73
End: 2023-08-17
Payer: MEDICARE

## 2023-08-17 VITALS
HEIGHT: 69 IN | HEART RATE: 69 BPM | TEMPERATURE: 97.3 F | BODY MASS INDEX: 33.34 KG/M2 | OXYGEN SATURATION: 93 % | DIASTOLIC BLOOD PRESSURE: 68 MMHG | SYSTOLIC BLOOD PRESSURE: 122 MMHG | WEIGHT: 225.12 LBS

## 2023-08-17 DIAGNOSIS — F02.B0 MODERATE ALZHEIMER'S DEMENTIA WITHOUT BEHAVIORAL DISTURBANCE, PSYCHOTIC DISTURBANCE, MOOD DISTURBANCE, OR ANXIETY, UNSPECIFIED TIMING OF DEMENTIA ONSET: ICD-10-CM

## 2023-08-17 DIAGNOSIS — G30.9 MODERATE ALZHEIMER'S DEMENTIA WITHOUT BEHAVIORAL DISTURBANCE, PSYCHOTIC DISTURBANCE, MOOD DISTURBANCE, OR ANXIETY, UNSPECIFIED TIMING OF DEMENTIA ONSET: ICD-10-CM

## 2023-08-17 DIAGNOSIS — I48.0 PAROXYSMAL ATRIAL FIBRILLATION: Primary | ICD-10-CM

## 2023-08-17 DIAGNOSIS — G47.33 OBSTRUCTIVE SLEEP APNEA SYNDROME: ICD-10-CM

## 2023-08-17 DIAGNOSIS — R73.9 HYPERGLYCEMIA: ICD-10-CM

## 2023-08-17 NOTE — PROGRESS NOTES
The ABCs of the Annual Wellness Visit  Subsequent Medicare Wellness Visit    Rudy Garcia is a 72 y.o. female who presents for a Subsequent Medicare Wellness Visit.    The following portions of the patient's history were reviewed and   updated as appropriate: allergies, current medications, past family history, past medical history, past social history, past surgical history, and problem list.    Compared to one year ago, the patient feels her physical   health is the same.    Compared to one year ago, the patient feels her mental   health is worse.    Recent Hospitalizations:  She was not admitted to the hospital during the last year.       Current Medical Providers:  Patient Care Team:  Jackson Guillory MD as PCP - General (Internal Medicine)    Outpatient Medications Prior to Visit   Medication Sig Dispense Refill    albuterol sulfate  (90 Base) MCG/ACT inhaler Inhale 2 puffs Every 6 (Six) Hours As Needed for Wheezing or Shortness of Air. 8 g 0    amLODIPine (NORVASC) 10 MG tablet Take 1 tablet by mouth Daily. 90 tablet 1    aspirin 81 MG tablet Take 1 tablet by mouth Daily.      atorvastatin (LIPITOR) 40 MG tablet Take 1 tablet by mouth Daily. 90 tablet 1    calcium-vitamin D (OSCAL-500) 500-200 MG-UNIT per tablet Take 1 tablet by mouth Daily.      Cetirizine HCl (ZYRTEC PO) Take  by mouth.      donepezil (Aricept) 10 MG tablet Take 1 tablet by mouth Every Night. 90 tablet 0    DULoxetine (CYMBALTA) 30 MG capsule Take 1 capsule by mouth Daily. 30 capsule 6    Eliquis 5 MG tablet tablet TAKE 1 TABLET EVERY 12 HOURS 180 tablet 2    FLUoxetine (PROzac) 10 MG capsule Take 1 capsule by mouth Daily. 90 capsule 1    furosemide (LASIX) 20 MG tablet TAKE 1 TABLET BY MOUTH ON MONDAY, WEDNESDAY, AND FRIDAY 36 tablet 1    hydroCHLOROthiazide (HYDRODIURIL) 12.5 MG tablet TAKE 1 TABLET EVERY DAY 90 tablet 1    losartan (COZAAR) 100 MG tablet Take 1 tablet by mouth Daily. 90 tablet 1    memantine (NAMENDA)  10 MG tablet Take 1 tablet by mouth 2 (Two) Times a Day.      mirtazapine (REMERON) 15 MG tablet Take 1 tablet by mouth Every Night. 90 tablet 1    Multiple Vitamin (MULTI-DAY VITAMINS) tablet Take 1 tablet by mouth Daily.      O2 (OXYGEN) Inhale 1 L/min Every Night.      pantoprazole (PROTONIX) 20 MG EC tablet       sotalol (BETAPACE) 80 MG tablet TAKE 1/2 TABLET TWICE DAILY 90 tablet 3    vitamin B-12 (CYANOCOBALAMIN) 1000 MCG tablet Take 0.5 tablets by mouth Daily.      fluticasone (FLONASE) 50 MCG/ACT nasal spray 2 sprays into the nostril(s) as directed by provider Daily As Needed for Rhinitis or Allergies.       No facility-administered medications prior to visit.       No opioid medication identified on active medication list. I have reviewed chart for other potential  high risk medication/s and harmful drug interactions in the elderly.        Aspirin is on active medication list. Aspirin use is indicated based on review of current medical condition/s. Pros and cons of this therapy have been discussed today. Benefits of this medication outweigh potential harm.  Patient has been encouraged to continue taking this medication.  .      Patient Active Problem List   Diagnosis    Anxiety    Dementia    Depression    Hyperlipidemia    Hypertension    Obstructive sleep apnea syndrome    Paroxysmal atrial fibrillation    Allergic rhinitis    Chronic constipation    CAD (coronary artery disease)    COPD (chronic obstructive pulmonary disease)    GERD (gastroesophageal reflux disease)    Carotid artery disease    Chronic fatigue    Encounter for Medicare annual wellness exam    Local edema    Hyperglycemia    Screening mammogram, encounter for    Chronic bilateral low back pain without sciatica    Chronic daily headache    Class 1 obesity due to excess calories with serious comorbidity and body mass index (BMI) of 31.0 to 31.9 in adult     Advance Care Planning   Advance Care Planning     Advance Directive is on file.   "ACP discussion was held with the patient during this visit. Patient has an advance directive in EMR which is still valid.      Objective    Vitals:    23 1051   BP: 122/68   BP Location: Left arm   Patient Position: Sitting   Cuff Size: Adult   Pulse: 69   Temp: 97.3 øF (36.3 øC)   SpO2: 93%   Weight: 102 kg (225 lb 1.9 oz)   Height: 175.3 cm (69.02\")   PainSc: 0-No pain     Estimated body mass index is 33.23 kg/mý as calculated from the following:    Height as of this encounter: 175.3 cm (69.02\").    Weight as of this encounter: 102 kg (225 lb 1.9 oz).    BMI is >= 30 and <35. (Class 1 Obesity). The following options were offered after discussion;: nutrition counseling/recommendations      Does the patient have evidence of cognitive impairment? Yes          HEALTH RISK ASSESSMENT    Smoking Status:  Social History     Tobacco Use   Smoking Status Former    Packs/day: 3.00    Years: 35.00    Pack years: 105.00    Types: Cigarettes    Quit date: 2005    Years since quittin.6   Smokeless Tobacco Never     Alcohol Consumption:  Social History     Substance and Sexual Activity   Alcohol Use Not Currently     Fall Risk Screen:    CORINA Fall Risk Assessment was completed, and patient is at LOW risk for falls.Assessment completed on:2023    Depression Screenin/17/2023    10:57 AM   PHQ-2/PHQ-9 Depression Screening   Little Interest or Pleasure in Doing Things 0-->not at all   Feeling Down, Depressed or Hopeless 0-->not at all   PHQ-9: Brief Depression Severity Measure Score 0       Health Habits and Functional and Cognitive Screenin/17/2023    10:58 AM   Functional & Cognitive Status   Do you have difficulty preparing food and eating? No   Do you have difficulty bathing yourself, getting dressed or grooming yourself? No   Do you have difficulty using the toilet? No   Do you have difficulty moving around from place to place? No   Do you have trouble with steps or getting out of a bed " or a chair? No   Current Diet Well Balanced Diet   Dental Exam Not up to date   Eye Exam Up to date   Exercise (times per week) 0 times per week   Current Exercises Include No Regular Exercise   Do you need help using the phone?  No   Are you deaf or do you have serious difficulty hearing?  Yes   Do you need help to go to places out of walking distance? No   Do you need help shopping? Yes   Do you need help preparing meals?  Yes   Do you need help with housework?  Yes   Do you need help with laundry? Yes   Do you need help taking your medications? Yes   Do you need help managing money? Yes   Do you ever drive or ride in a car without wearing a seat belt? No   Have you felt unusual stress, anger or loneliness in the last month? No   Who do you live with? Alone   If you need help, do you have trouble finding someone available to you? No   Have you been bothered in the last four weeks by sexual problems? No   Do you have difficulty concentrating, remembering or making decisions? Yes       Age-appropriate Screening Schedule:  Refer to the list below for future screening recommendations based on patient's age, sex and/or medical conditions. Orders for these recommended tests are listed in the plan section. The patient has been provided with a written plan.    Health Maintenance   Topic Date Due    ZOSTER VACCINE (1 of 2) Never done    DXA SCAN  07/26/2020    COVID-19 Vaccine (3 - Booster for Rosette series) 07/20/2022    ANNUAL WELLNESS VISIT  07/18/2023    LIPID PANEL  07/22/2023    INFLUENZA VACCINE  10/01/2023    MAMMOGRAM  02/14/2025    COLORECTAL CANCER SCREENING  09/27/2027    HEPATITIS C SCREENING  Completed    Pneumococcal Vaccine 65+  Completed    PAP SMEAR  Discontinued    TDAP/TD VACCINES  Discontinued                  CMS Preventative Services Quick Reference  Risk Factors Identified During Encounter  Depression/Dysphoria: Current medication is effective, no change recommended  Polypharmacy: Medication List  "reviewed and considering d/c ASA  The above risks/problems have been discussed with the patient.  Pertinent information has been shared with the patient in the After Visit Summary.  An After Visit Summary and PPPS were made available to the patient.    Follow Up:   Next Medicare Wellness visit to be scheduled in 1 year.       Additional E&M Note during same encounter follows:  Patient has multiple medical problems which are significant and separately identifiable that require additional work above and beyond the Medicare Wellness Visit.      Chief Complaint  Medicare Wellness-subsequent    Subjective        HPI  Nery Garcia is also being seen today for worsening dementia    Review of Systems   Constitutional:  Negative for activity change, appetite change, fatigue and fever.   HENT:  Negative for congestion, ear discharge, ear pain and trouble swallowing.    Eyes:  Negative for photophobia and visual disturbance.   Respiratory:  Negative for cough and shortness of breath.    Cardiovascular:  Negative for chest pain and palpitations.   Gastrointestinal:  Negative for abdominal distention, constipation, diarrhea, nausea and vomiting.   Genitourinary:  Negative for dysuria, hematuria and urgency.   Musculoskeletal:  Positive for arthralgias. Negative for back pain, joint swelling and myalgias.   Skin:  Negative for color change and rash.   Neurological:  Positive for confusion. Negative for dizziness, weakness and light-headedness.   Hematological:  Negative for adenopathy. Does not bruise/bleed easily.   Psychiatric/Behavioral:  Positive for decreased concentration and sleep disturbance. Negative for agitation and dysphoric mood. The patient is not nervous/anxious.      Objective   Vital Signs:  /68 (BP Location: Left arm, Patient Position: Sitting, Cuff Size: Adult)   Pulse 69   Temp 97.3 øF (36.3 øC)   Ht 175.3 cm (69.02\")   Wt 102 kg (225 lb 1.9 oz)   SpO2 93%   BMI 33.23 kg/mý     Physical " Exam  Constitutional:       General: She is not in acute distress.     Appearance: She is well-developed.   HENT:      Nose: Nose normal.   Eyes:      General: No scleral icterus.     Conjunctiva/sclera: Conjunctivae normal.   Neck:      Thyroid: No thyromegaly.      Trachea: No tracheal deviation.   Cardiovascular:      Rate and Rhythm: Normal rate and regular rhythm.      Heart sounds: No murmur heard.    No friction rub.   Pulmonary:      Effort: No respiratory distress.      Breath sounds: No wheezing or rales.   Abdominal:      General: There is no distension.      Palpations: Abdomen is soft. There is no mass.      Tenderness: There is no abdominal tenderness. There is no guarding.   Musculoskeletal:         General: Deformity present. Normal range of motion.   Lymphadenopathy:      Cervical: No cervical adenopathy.   Skin:     General: Skin is warm and dry.      Findings: No erythema or rash.   Neurological:      Mental Status: She is alert and oriented to person, place, and time.      Cranial Nerves: No cranial nerve deficit.      Coordination: Coordination normal.      Deep Tendon Reflexes: Reflexes are normal and symmetric.   Psychiatric:         Behavior: Behavior normal.                       Assessment and Plan   Diagnoses and all orders for this visit:    1. Paroxysmal atrial fibrillation (Primary) stable maintaining a regular rate on anticoagulant therapy with Eliquis    2. Hyperglycemia continue with the dietary restrictions follow A1c    3. Obstructive sleep apnea syndrome denies daytime somnolence    4. Moderate Alzheimer's dementia without behavioral disturbance, psychotic disturbance, mood disturbance, or anxiety, unspecified timing of dementia onset stable on donepezil and Namenda.  Following up with neurology             Follow Up   No follow-ups on file.  Patient was given instructions and counseling regarding her condition or for health maintenance advice. Please see specific information  pulled into the AVS if appropriate.

## 2023-08-21 RX ORDER — FUROSEMIDE 20 MG/1
TABLET ORAL
Qty: 36 TABLET | Refills: 1 | Status: SHIPPED | OUTPATIENT
Start: 2023-08-21

## 2023-10-30 RX ORDER — APIXABAN 5 MG/1
TABLET, FILM COATED ORAL
Qty: 180 TABLET | Refills: 1 | Status: SHIPPED | OUTPATIENT
Start: 2023-10-30

## 2023-11-05 ENCOUNTER — PATIENT MESSAGE (OUTPATIENT)
Dept: INTERNAL MEDICINE | Facility: CLINIC | Age: 73
End: 2023-11-05
Payer: MEDICARE

## 2023-11-06 ENCOUNTER — OFFICE VISIT (OUTPATIENT)
Dept: CARDIOLOGY | Facility: CLINIC | Age: 73
End: 2023-11-06
Payer: MEDICARE

## 2023-11-06 VITALS
DIASTOLIC BLOOD PRESSURE: 64 MMHG | OXYGEN SATURATION: 94 % | BODY MASS INDEX: 33.92 KG/M2 | HEIGHT: 69 IN | HEART RATE: 70 BPM | WEIGHT: 229 LBS | SYSTOLIC BLOOD PRESSURE: 116 MMHG

## 2023-11-06 DIAGNOSIS — I10 PRIMARY HYPERTENSION: ICD-10-CM

## 2023-11-06 DIAGNOSIS — E78.2 MIXED HYPERLIPIDEMIA: ICD-10-CM

## 2023-11-06 DIAGNOSIS — I48.0 PAROXYSMAL ATRIAL FIBRILLATION: Primary | ICD-10-CM

## 2023-11-06 PROCEDURE — 3078F DIAST BP <80 MM HG: CPT | Performed by: INTERNAL MEDICINE

## 2023-11-06 PROCEDURE — 93000 ELECTROCARDIOGRAM COMPLETE: CPT | Performed by: INTERNAL MEDICINE

## 2023-11-06 PROCEDURE — 99214 OFFICE O/P EST MOD 30 MIN: CPT | Performed by: INTERNAL MEDICINE

## 2023-11-06 PROCEDURE — 3074F SYST BP LT 130 MM HG: CPT | Performed by: INTERNAL MEDICINE

## 2023-11-06 RX ORDER — AMLODIPINE BESYLATE 10 MG/1
10 TABLET ORAL DAILY
Qty: 90 TABLET | Refills: 3 | Status: SHIPPED | OUTPATIENT
Start: 2023-11-06 | End: 2023-11-06 | Stop reason: SDUPTHER

## 2023-11-06 RX ORDER — ATORVASTATIN CALCIUM 40 MG/1
40 TABLET, FILM COATED ORAL DAILY
Qty: 90 TABLET | Refills: 3 | Status: SHIPPED | OUTPATIENT
Start: 2023-11-06

## 2023-11-06 RX ORDER — ATORVASTATIN CALCIUM 40 MG/1
40 TABLET, FILM COATED ORAL DAILY
Qty: 90 TABLET | Refills: 3 | Status: SHIPPED | OUTPATIENT
Start: 2023-11-06 | End: 2023-11-06 | Stop reason: SDUPTHER

## 2023-11-06 RX ORDER — AMLODIPINE BESYLATE 10 MG/1
10 TABLET ORAL DAILY
Qty: 90 TABLET | Refills: 3 | Status: SHIPPED | OUTPATIENT
Start: 2023-11-06

## 2023-11-06 NOTE — PROGRESS NOTES
[facility] Cardiology  Office Progress Note  Nery Garcia  1950  [address] [address]       Visit Date: 11/06/23    PCP: Jackson Guillory MD  [address]  [place] [id number]    IDENTIFICATION: A 72 y.o. female  retired  from [employer]    PROBLEM LIST:   Carotid artery disease.  Carotid MRA, January 2012, revealing 50% to 75% right ICA stenosis.  April 2018, carotid duplex with 70% stenosis on the right, less than 50% on the left. Dr Jackson saldivar, pt deferred future fu   4/19 CUS <70% bilat  10/20 US carotids: Right ICA: 50-69%.  Left ICA: 0-49%.  Nonobstructive coronary artery disease with normal left ventricular function per cardiac catheterization in October 2012.  Atrial fibrillation.  Initial diagnosis in November 2010.  Previous Multaq therapy - currently on propafenone.  CHADS vasc 2   Pulmonary embolus, January 2012.  Hypertension - labile.  Dyslipidemia.  7/22 155/86/65/74  Insulin resistance  7/22 A1c 5.8  'History of tobacco abuse with cessation in 2006.  Chronic obstructive pulmonary disease.  Gastroesophageal reflux disease.  Peptic ulcer disease noted on EGD, November 2013.  Restless leg syndrome.  Fatty liver per evaluation, November 2013.  Dementia followed per Dr. Chandler at [facility]. 3 sisters with precocious dementia  Questionable sleep apnea.  Surgical history.  Hysterectomy.  Bilateral breast lumpectomies  Cataract extractions       CC:   Chief Complaint   Patient presents with    Paroxysmal atrial fibrillation       Allergies  Allergies   Allergen Reactions    Lisinopril Cough       Current Medications    Current Outpatient Medications:     amLODIPine (NORVASC) 10 MG tablet, Take 1 tablet by mouth Daily., Disp: 90 tablet, Rfl: 3    apixaban (Eliquis) 5 MG tablet tablet, TAKE 1 TABLET EVERY 12 HOURS, Disp: 180 tablet, Rfl: 1    atorvastatin (LIPITOR) 40 MG tablet, Take 1 tablet by mouth Daily., Disp:  "90 tablet, Rfl: 3    calcium-vitamin D (OSCAL-500) 500-200 MG-UNIT per tablet, Take 1 tablet by mouth Daily., Disp: , Rfl:     Cetirizine HCl (ZYRTEC PO), Take  by mouth., Disp: , Rfl:     donepezil (Aricept) 10 MG tablet, Take 1 tablet by mouth Every Night., Disp: 90 tablet, Rfl: 0    DULoxetine (CYMBALTA) 30 MG capsule, Take 1 capsule by mouth Daily., Disp: 30 capsule, Rfl: 6    FLUoxetine (PROzac) 10 MG capsule, Take 1 capsule by mouth Daily., Disp: 90 capsule, Rfl: 1    furosemide (LASIX) 20 MG tablet, TAKE 1 TABLET BY MOUTH ON MONDAY, WEDNESDAY, AND FRIDAY, Disp: 36 tablet, Rfl: 1    hydroCHLOROthiazide (HYDRODIURIL) 12.5 MG tablet, TAKE 1 TABLET EVERY DAY, Disp: 90 tablet, Rfl: 1    losartan (COZAAR) 100 MG tablet, Take 1 tablet by mouth Daily., Disp: 90 tablet, Rfl: 1    memantine (NAMENDA) 10 MG tablet, Take 1 tablet by mouth 2 (Two) Times a Day., Disp: , Rfl:     mirtazapine (REMERON) 15 MG tablet, Take 1 tablet by mouth Every Night., Disp: 90 tablet, Rfl: 1    Multiple Vitamin (MULTI-DAY VITAMINS) tablet, Take 1 tablet by mouth Daily., Disp: , Rfl:     O2 (OXYGEN), Inhale 1 L/min Every Night., Disp: , Rfl:     pantoprazole (PROTONIX) 20 MG EC tablet, , Disp: , Rfl:     sotalol (BETAPACE) 80 MG tablet, TAKE 1/2 TABLET TWICE DAILY, Disp: 90 tablet, Rfl: 3    vitamin B-12 (CYANOCOBALAMIN) 1000 MCG tablet, Take 0.5 tablets by mouth Daily., Disp: , Rfl:     vitamin D3 125 MCG (5000 UT) capsule capsule, Take 1 capsule by mouth Daily., Disp: , Rfl:       History of Present Illness   Nery Garcia is a 72 y.o. year old female here for follow up.    Accompanied by her daughter that lives next door to her.  Her  memory impairment is as expected worsening  No new cardiac issues    OBJECTIVE:  Vitals:    11/06/23 1338   BP: 116/64   BP Location: Right arm   Patient Position: Sitting   Pulse: 70   SpO2: 94%   Weight: 104 kg (229 lb)   Height: 175.3 cm (69\")       Body mass index is 33.82 kg/m².    Constitutional:       " Appearance: Healthy appearance. Not in distress.   Neck:      Vascular: No JVR. JVD normal.   Pulmonary:      Effort: Pulmonary effort is normal.      Breath sounds: Normal breath sounds. No wheezing. No rhonchi. No rales.   Chest:      Chest wall: Not tender to palpatation.   Cardiovascular:      PMI at left midclavicular line. Bradycardia present. Regular rhythm. Normal S1. Normal S2.       Murmurs: There is no murmur.      No gallop.  No click. No rub.   Pulses:     Intact distal pulses.   Edema:     Peripheral edema absent.   Abdominal:      General: Bowel sounds are normal.      Palpations: Abdomen is soft.      Tenderness: There is no abdominal tenderness.   Musculoskeletal: Normal range of motion.         General: No tenderness. Skin:     General: Skin is warm and dry.   Neurological:      General: No focal deficit present.      Mental Status: Alert and oriented to person, place and time.         Diagnostic Data:    ECG 12 Lead    Date/Time: 11/6/2023 1:48 PM  Performed by: Mega Kendall MD    Authorized by: Mega Kendall MD  Comparison: compared with previous ECG from 4/7/2023  Similar to previous ECG  Rhythm: sinus rhythm  BPM: 73  Conduction: right bundle branch block    Clinical impression: abnormal EKG            ASSESSMENT:   Diagnosis Plan   1. Paroxysmal atrial fibrillation        2. Primary hypertension        3. Mixed hyperlipidemia              PLAN:  Paroxysmal A. Fib [id number] 3 maintaining sinus mechanism on half dose sotalol, anticoagulated with apixaban    Hypertension controlled -amlodipine losartan HCTZ    Dyslipidemia controlled on statin therapy          Mega Kendall MD, [facility]

## 2023-11-06 NOTE — TELEPHONE ENCOUNTER
From: Nery Garcia  To: Jackson Guillory  Sent: 11/5/2023 2:31 PM EST  Subject: RX Refills    I need refills to be sent to [facility] for Amlodipine 10 mg and Atorvastatin 40 mg for Nery Garcia. She is out of refills and the pharmacy needs a new prescription before they can be filled. Thank you.    YARED QureshiA  Daughter of Nery Garcia

## 2023-11-22 ENCOUNTER — OFFICE VISIT (OUTPATIENT)
Dept: INTERNAL MEDICINE | Facility: CLINIC | Age: 73
End: 2023-11-22
Payer: MEDICARE

## 2023-11-22 VITALS
SYSTOLIC BLOOD PRESSURE: 124 MMHG | BODY MASS INDEX: 33.77 KG/M2 | HEART RATE: 70 BPM | HEIGHT: 69 IN | DIASTOLIC BLOOD PRESSURE: 82 MMHG | TEMPERATURE: 97 F | WEIGHT: 228 LBS | OXYGEN SATURATION: 92 %

## 2023-11-22 DIAGNOSIS — R51.9 CHRONIC DAILY HEADACHE: Primary | ICD-10-CM

## 2023-11-22 PROCEDURE — 3074F SYST BP LT 130 MM HG: CPT | Performed by: FAMILY MEDICINE

## 2023-11-22 PROCEDURE — 99213 OFFICE O/P EST LOW 20 MIN: CPT | Performed by: FAMILY MEDICINE

## 2023-11-22 PROCEDURE — 3079F DIAST BP 80-89 MM HG: CPT | Performed by: FAMILY MEDICINE

## 2023-11-22 PROCEDURE — 1160F RVW MEDS BY RX/DR IN RCRD: CPT | Performed by: FAMILY MEDICINE

## 2023-11-22 PROCEDURE — 1159F MED LIST DOCD IN RCRD: CPT | Performed by: FAMILY MEDICINE

## 2023-11-22 NOTE — ASSESSMENT & PLAN NOTE
May be tension related. Encouraged the use of topical analgesics to help with potential neck strain that could be causing the headache. Encouraged use of Tylenol rather than ibuprofen.

## 2023-11-22 NOTE — PROGRESS NOTES
Nery Garcia is a 73 y.o. female.    Chief Complaint   Patient presents with    Earache     Started recently. Right ear was hurting has gotten a little better.     Headache     Has been on going for a few weeks.   Behind ear, down back of neck and backside of head. All on right side.        JAILENE Garcia is a 73-year-old female with date of birth 1950. She presents today complaining of a headache. She is accompanied by a her daughter.    The patient is doing well. She has a headache, especially on the right side of her head. She has been experiencing these headaches constantly. She has been complaining of headaches for the last 2 weeks. She will go through cycles with having headaches and with her last cycle, her neurologist prescribed Cymbalta. Her headaches had improved with Cymbalta at that time. The daughter accompanying her is concerned about the reaction she may have with taking ibuprofen and her regular medications. Her neurologist is Dr. Quintanilla at . She denies feeling nauseous with headaches, but her appetite has decreased. She does not see spots or squiggles with headaches and denies being sensitive to light or sound. The ibuprofen does not seem to help with headaches. The patient has dementia, so she does not remember if she took medicine and will continuously ask for more medication; this causes difficulty with knowing what is providing relief for the patient. She will continue to complain about headaches after taking the ibuprofen. Her ears are not concerning as of now, but she was complaining of earache a couple of days ago. The patient has not complained of nasal congestion, but she was coughing earlier this morning. She takes Zyrtec daily. The patient has not tried taking Tylenol for the headaches. The headaches she is having are not considered the worst headaches she has experienced. The patient has been constantly napping on the couch and her daughter is concerned that her headaches are  "being caused by neck strain. They do have Salonpas patches at home and they will be applying this to her neck.    Her daughter states the patient had a \"hole\" in one her eardrums, but she is not sure which one. She was recommended to put wax in her ear when she showers to avoid any water getting in her ear. She was previously seen at [facility], but she has not had a visit in a while.       The following portions of the patient's history were reviewed and updated as appropriate: allergies, current medications, past family history, past medical history, past social history, past surgical history and problem list.     Allergies   Allergen Reactions    Lisinopril Cough         Current Outpatient Medications:     amLODIPine (NORVASC) 10 MG tablet, Take 1 tablet by mouth Daily., Disp: 90 tablet, Rfl: 3    apixaban (Eliquis) 5 MG tablet tablet, TAKE 1 TABLET EVERY 12 HOURS, Disp: 180 tablet, Rfl: 1    atorvastatin (LIPITOR) 40 MG tablet, Take 1 tablet by mouth Daily., Disp: 90 tablet, Rfl: 3    calcium-vitamin D (OSCAL-500) 500-200 MG-UNIT per tablet, Take 1 tablet by mouth Daily., Disp: , Rfl:     Cetirizine HCl (ZYRTEC PO), Take  by mouth., Disp: , Rfl:     donepezil (Aricept) 10 MG tablet, Take 1 tablet by mouth Every Night., Disp: 90 tablet, Rfl: 0    DULoxetine (CYMBALTA) 30 MG capsule, Take 1 capsule by mouth Daily., Disp: 30 capsule, Rfl: 6    FLUoxetine (PROzac) 10 MG capsule, Take 1 capsule by mouth Daily., Disp: 90 capsule, Rfl: 1    furosemide (LASIX) 20 MG tablet, TAKE 1 TABLET BY MOUTH ON MONDAY, WEDNESDAY, AND FRIDAY, Disp: 36 tablet, Rfl: 1    hydroCHLOROthiazide (HYDRODIURIL) 12.5 MG tablet, TAKE 1 TABLET EVERY DAY, Disp: 90 tablet, Rfl: 1    losartan (COZAAR) 100 MG tablet, Take 1 tablet by mouth Daily., Disp: 90 tablet, Rfl: 1    memantine (NAMENDA) 10 MG tablet, Take 1 tablet by mouth 2 (Two) Times a Day., Disp: , Rfl:     mirtazapine (REMERON) 15 MG tablet, Take 1 tablet by mouth " "Every Night., Disp: 90 tablet, Rfl: 1    Multiple Vitamin (MULTI-DAY VITAMINS) tablet, Take 1 tablet by mouth Daily., Disp: , Rfl:     O2 (OXYGEN), Inhale 1 L/min Every Night., Disp: , Rfl:     pantoprazole (PROTONIX) 20 MG EC tablet, , Disp: , Rfl:     sotalol (BETAPACE) 80 MG tablet, TAKE 1/2 TABLET TWICE DAILY, Disp: 90 tablet, Rfl: 3    vitamin B-12 (CYANOCOBALAMIN) 1000 MCG tablet, Take 0.5 tablets by mouth Daily., Disp: , Rfl:     vitamin D3 125 MCG (5000 UT) capsule capsule, Take 1 capsule by mouth Daily., Disp: , Rfl:     ROS    Review of Systems   Constitutional:  Negative for chills, fatigue and fever.   HENT:  Negative for congestion, postnasal drip and sore throat.    Respiratory:  Negative for cough, shortness of breath and wheezing.    Cardiovascular:  Negative for chest pain and leg swelling.   Gastrointestinal:  Negative for abdominal pain, constipation, diarrhea, nausea and vomiting.   Musculoskeletal:  Positive for neck pain. Negative for arthralgias and back pain.   Allergic/Immunologic: Positive for environmental allergies.   Neurological:  Positive for headache and memory problem. Negative for weakness and numbness.       Vitals:    11/22/23 0823   BP: 124/82   BP Location: Right arm   Patient Position: Sitting   Cuff Size: Adult   Pulse: 70   Temp: 97 °F (36.1 °C)   SpO2: 92%   Weight: 103 kg (228 lb)   Height: 175.3 cm (69\")         Physical Exam     Physical Exam  Constitutional:       General: She is not in acute distress.     Appearance: Normal appearance. She is well-developed.   HENT:      Head: Normocephalic and atraumatic.      Right Ear: External ear normal.      Left Ear: Tympanic membrane and external ear normal.      Ears:      Comments: Cerumen noted to the right ear canal that is obstructing the view of the tympanic membrane.     Mouth/Throat:      Pharynx: No posterior oropharyngeal erythema.   Eyes:      Extraocular Movements: Extraocular movements intact.      " Conjunctiva/sclera: Conjunctivae normal.   Cardiovascular:      Rate and Rhythm: Normal rate and regular rhythm.      Heart sounds: No murmur heard.  Pulmonary:      Effort: Pulmonary effort is normal. No respiratory distress.      Breath sounds: Normal breath sounds. No wheezing.   Abdominal:      General: Bowel sounds are normal. There is no distension.      Palpations: Abdomen is soft.      Tenderness: There is no abdominal tenderness.   Skin:     General: Skin is warm and dry.   Neurological:      Mental Status: She is alert and oriented to person, place, and time.      Cranial Nerves: No cranial nerve deficit.   Psychiatric:         Mood and Affect: Mood normal.         Behavior: Behavior normal.         Assessment/Plan    Diagnoses and all orders for this visit:    1. Chronic daily headache (Primary)  Assessment & Plan:  May be tension related. Encouraged the use of topical analgesics to help with potential neck strain that could be causing the headache. Encouraged use of Tylenol rather than ibuprofen.             No orders of the defined types were placed in this encounter.      No orders of the defined types were placed in this encounter.      Return if symptoms worsen or fail to improve.    Brit Vigil DO        Transcribed from ambient dictation for Brit Vigil DO by Nathaly Knott.  11/22/23   09:15 EST    Patient or patient representative verbalized consent to the visit recording.  I have personally performed the services described in this document as transcribed by the above individual, and it is both accurate and complete.  Brit Vigil DO  11/22/2023  14:21 EST

## 2023-12-03 ENCOUNTER — PATIENT MESSAGE (OUTPATIENT)
Dept: INTERNAL MEDICINE | Facility: CLINIC | Age: 73
End: 2023-12-03
Payer: MEDICARE

## 2023-12-04 RX ORDER — MIRTAZAPINE 15 MG/1
15 TABLET, FILM COATED ORAL NIGHTLY
Qty: 90 TABLET | Refills: 1 | Status: SHIPPED | OUTPATIENT
Start: 2023-12-04

## 2023-12-04 NOTE — TELEPHONE ENCOUNTER
From: Nery Garcia  To: Jackson Guillory  Sent: 12/3/2023 8:27 AM EST  Subject: Refill Request for Mirtazapine 15 mg    My Mom (Nery Garcia) needs a refill of the above medication. I tried to submit a request using the refill medication option and there was a note on the medication that it cannot be refilled through the system. She is not quite out yet and has some left but when I checked on [facility] it said there were no refills available. She is taking them as prescribed. I'm just trying to ensure that she doesn't run completely out before it is refilled. Thank you for your assistance.    Rama Garcia, Daughter/POA

## 2023-12-18 RX ORDER — SOTALOL HYDROCHLORIDE 80 MG/1
TABLET ORAL
Qty: 90 TABLET | Refills: 1 | Status: SHIPPED | OUTPATIENT
Start: 2023-12-18

## 2024-01-17 ENCOUNTER — PATIENT MESSAGE (OUTPATIENT)
Dept: INTERNAL MEDICINE | Facility: CLINIC | Age: 74
End: 2024-01-17
Payer: MEDICARE

## 2024-01-17 ENCOUNTER — OFFICE VISIT (OUTPATIENT)
Dept: INTERNAL MEDICINE | Facility: CLINIC | Age: 74
End: 2024-01-17
Payer: MEDICARE

## 2024-01-17 VITALS
OXYGEN SATURATION: 93 % | TEMPERATURE: 97.1 F | DIASTOLIC BLOOD PRESSURE: 80 MMHG | HEART RATE: 65 BPM | HEIGHT: 69 IN | SYSTOLIC BLOOD PRESSURE: 121 MMHG | BODY MASS INDEX: 32.9 KG/M2 | WEIGHT: 222.12 LBS

## 2024-01-17 DIAGNOSIS — H91.91 HEARING LOSS OF RIGHT EAR, UNSPECIFIED HEARING LOSS TYPE: ICD-10-CM

## 2024-01-17 DIAGNOSIS — F02.B18 MODERATE LATE ONSET ALZHEIMER'S DEMENTIA WITH OTHER BEHAVIORAL DISTURBANCE: ICD-10-CM

## 2024-01-17 DIAGNOSIS — H60.391 OTHER INFECTIVE ACUTE OTITIS EXTERNA OF RIGHT EAR: ICD-10-CM

## 2024-01-17 DIAGNOSIS — R51.9 CHRONIC DAILY HEADACHE: Primary | ICD-10-CM

## 2024-01-17 DIAGNOSIS — J43.8 OTHER EMPHYSEMA: ICD-10-CM

## 2024-01-17 DIAGNOSIS — G30.1 MODERATE LATE ONSET ALZHEIMER'S DEMENTIA WITH OTHER BEHAVIORAL DISTURBANCE: ICD-10-CM

## 2024-01-17 DIAGNOSIS — H61.21 IMPACTED CERUMEN, RIGHT EAR: ICD-10-CM

## 2024-01-17 PROCEDURE — 99214 OFFICE O/P EST MOD 30 MIN: CPT | Performed by: NURSE PRACTITIONER

## 2024-01-17 PROCEDURE — 3074F SYST BP LT 130 MM HG: CPT | Performed by: NURSE PRACTITIONER

## 2024-01-17 PROCEDURE — 69210 REMOVE IMPACTED EAR WAX UNI: CPT | Performed by: NURSE PRACTITIONER

## 2024-01-17 PROCEDURE — 3079F DIAST BP 80-89 MM HG: CPT | Performed by: NURSE PRACTITIONER

## 2024-01-17 PROCEDURE — 1159F MED LIST DOCD IN RCRD: CPT | Performed by: NURSE PRACTITIONER

## 2024-01-17 PROCEDURE — 1160F RVW MEDS BY RX/DR IN RCRD: CPT | Performed by: NURSE PRACTITIONER

## 2024-01-17 RX ORDER — CIPROFLOXACIN AND DEXAMETHASONE 3; 1 MG/ML; MG/ML
4 SUSPENSION/ DROPS AURICULAR (OTIC) 2 TIMES DAILY
Qty: 7.5 ML | Refills: 0 | Status: SHIPPED | OUTPATIENT
Start: 2024-01-17 | End: 2024-01-24

## 2024-01-17 RX ORDER — FLUOXETINE 10 MG/1
10 CAPSULE ORAL DAILY
Qty: 90 CAPSULE | Refills: 1 | Status: SHIPPED | OUTPATIENT
Start: 2024-01-17

## 2024-01-17 NOTE — TELEPHONE ENCOUNTER
From: Nery Garcia  To: Jackson Guillory  Sent: 1/17/2024 11:13 AM EST  Subject: Refill Request for Fluoxetine 10mg    My Mom needs a refill for the above medication submitted to [facility]. I received a notice stating they've been unable to refill because they could not reach the prescriber. Can you please assist?    Rama Garcia, Daughter (POA)

## 2024-01-17 NOTE — PROGRESS NOTES
Office Visit      Patient Name: Nery Garcia  : 1950   MRN: [id number]   Care Team: Patient Care Team:  Jackson Guillory MD as PCP - General (Internal Medicine)    Chief Complaint  Dizziness (Fatigue, dizziness, hearing loss, headache.  )    Subjective     Subjective      Nery Garcia presents to [facility] for chronic complaints with worsening hearing problem.   Complex patient with multiple co-morbidities including dementia, carotid artery stenosis, and chronic daily headaches.  Daughter is here today helping with the history.  She actually lives beside her mother and helps with her care.  She also has a caretaker come in for a few hours each day to help with her mother's care.  Today she is complaining of her chronic headaches that she sees neurology for but this morning had sudden onset of decreased hearing in the right ear.  The patient was unaware of this acute change.  Daughter states when she went to go see her this morning was talking directly at her but she could not hear her.  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  She denies any other neurologic deficits including slurred speech, unilateral weakness, numbness or tingling in the hands, facial droop, or worsening confusion from baseline.  She has been using a heating pad for her upper back and neck to help with her headaches.  She does take extra strength Tylenol daily.  She has not spoke with her neurologist regarding her headaches.  She sees  for this problem.  She is also complaining of her chronic shortness of breath.  This is not worsening.  Her oxygen saturation is normal, she has no chest pain.  She does have known carotid artery disease and has not been a surgical candidate for carotid endarterectomy.  She is on blood thinners with apixaban due to her paroxysmal atrial fibrillation.  She does not feel like she is having palpitations.  She does use 2 L nasal cannula of oxygen as needed  "at home.      Ear Cerumen Removal    Date/Time: 1/17/2024 5:59 PM    Performed by: Jessie King APRN  Authorized by: Jessie King APRN  Consent: Verbal consent obtained.  Risks and benefits: risks, benefits and alternatives were discussed  Consent given by: patient (and daughter)  Patient identity confirmed: verbally with patient    Anesthesia:  Local Anesthetic: none  Location details: right ear  Patient tolerance: patient tolerated the procedure well with no immediate complications  Comments: Large amount of hardened cerumen removed with curette from the right ear.  She tolerated the procedure well.  The tympanic membrane was visualized postprocedure and is ruptured which is baseline, there was also debris consistent with pus in the ear canal  Procedure type: instrumentation, curette         Objective     Objective   Vital Signs:   /80   Pulse 65   Temp 97.1 °F (36.2 °C) (Tympanic)   Ht 175.3 cm (69\")   Wt 101 kg (222 lb 1.9 oz)   SpO2 93%   BMI 32.80 kg/m²     Physical Exam  Vitals and nursing note reviewed.   Constitutional:       General: She is not in acute distress.     Appearance: Normal appearance. She is not ill-appearing or toxic-appearing.   HENT:      Right Ear: Decreased hearing noted. Drainage (Thick yellow) present. Tympanic membrane is perforated.      Left Ear: Tympanic membrane and ear canal normal. Decreased hearing noted.   Eyes:      Extraocular Movements: Extraocular movements intact.      Pupils: Pupils are equal, round, and reactive to light.      Comments: No nystagmus   Neck:      Vascular: No carotid bruit (No appreciable bruit).   Cardiovascular:      Rate and Rhythm: Normal rate and regular rhythm.      Heart sounds: Normal heart sounds. No murmur heard.  Pulmonary:      Effort: Pulmonary effort is normal. No respiratory distress.      Breath sounds: Normal breath sounds. No wheezing.   Musculoskeletal:         General: Deformity present.      Cervical back: Neck " supple. No tenderness.   Skin:     General: Skin is warm and dry.      Findings: No rash.   Neurological:      General: No focal deficit present.      Mental Status: She is alert.      Motor: No weakness.      Gait: Gait normal.      Comments: Confused conversation but patient is oriented to person and place   Psychiatric:         Mood and Affect: Mood normal.         Behavior: Behavior normal.          Assessment / Plan      Assessment & Plan   Problem List Items Addressed This Visit       Dementia    Suspect this is contributing to her chronic symptoms.  Prior to leaving the clinic patient was having some improvement, she was able to ambulate without problems.  Encouraged to follow-up with neurology and continue current medication regimen.      COPD (chronic obstructive pulmonary disease)    Overview        Suspect shortness of breath that is chronic is related to the above.  Continue as needed albuterol and encouraged to wear oxygen as needed at home for shortness of breath.  Oxygen saturation today is similar to her previous and pulmonary exam is unremarkable, do not see benefit in adding any oral steroids or antibiotics at this time.       Chronic daily headache - Primary    No acute neurologic deficit today to indicate need for acute imaging.  Educated daughter on signs and symptoms to go to the ER with for immediate care.  Continue duloxetine at current dose and can take extra strength Tylenol 3 times daily if needed for headache.  Would try to avoid NSAIDs at her age and with apixaban high risk for bleeding.  Continue heating pad as needed, stress management, and push fluids.  Attempted to get UA today and patient was unable, offered to send home supplies and daughter politely declines.     Other Visit Diagnoses       Hearing loss of right ear, unspecified hearing loss type        Removed which moderate amount of hardened cerumen from the right ear, could be contributing.  May need close follow-up with ENT  if not improving.  Neurologic exam today grossly benign.    Other infective acute otitis externa of right ear        Treat with Ciprodex, avoid anything in the ear canal, keep canal clean and dry.              Follow Up   Return for Keep scheduled follow-up.  Patient was given instructions and counseling regarding her condition or for health maintenance advice. Please see specific information pulled into the AVS if appropriate.     SHEILA Maciel  [facility]

## 2024-01-18 ENCOUNTER — TELEPHONE (OUTPATIENT)
Dept: INTERNAL MEDICINE | Facility: CLINIC | Age: 74
End: 2024-01-18
Payer: MEDICARE

## 2024-01-18 NOTE — TELEPHONE ENCOUNTER
Called daughter to see how patient is doing today, seems much better. Hearing better and mood is Improved as well as dizziness. No further recommendations.

## 2024-02-19 ENCOUNTER — OFFICE VISIT (OUTPATIENT)
Dept: INTERNAL MEDICINE | Facility: CLINIC | Age: 74
End: 2024-02-19
Payer: MEDICARE

## 2024-02-19 VITALS
WEIGHT: 223.12 LBS | HEIGHT: 69 IN | TEMPERATURE: 96.8 F | BODY MASS INDEX: 33.05 KG/M2 | HEART RATE: 69 BPM | SYSTOLIC BLOOD PRESSURE: 141 MMHG | DIASTOLIC BLOOD PRESSURE: 79 MMHG | OXYGEN SATURATION: 93 %

## 2024-02-19 DIAGNOSIS — G30.9 MODERATE ALZHEIMER'S DEMENTIA WITHOUT BEHAVIORAL DISTURBANCE, PSYCHOTIC DISTURBANCE, MOOD DISTURBANCE, OR ANXIETY, UNSPECIFIED TIMING OF DEMENTIA ONSET: ICD-10-CM

## 2024-02-19 DIAGNOSIS — F02.B0 MODERATE ALZHEIMER'S DEMENTIA WITHOUT BEHAVIORAL DISTURBANCE, PSYCHOTIC DISTURBANCE, MOOD DISTURBANCE, OR ANXIETY, UNSPECIFIED TIMING OF DEMENTIA ONSET: ICD-10-CM

## 2024-02-19 DIAGNOSIS — I10 PRIMARY HYPERTENSION: ICD-10-CM

## 2024-02-19 DIAGNOSIS — I48.0 PAROXYSMAL ATRIAL FIBRILLATION: Primary | ICD-10-CM

## 2024-02-19 PROBLEM — R60.0 LOCAL EDEMA: Status: RESOLVED | Noted: 2020-01-29 | Resolved: 2024-02-19

## 2024-02-19 PROBLEM — Z00.00 ENCOUNTER FOR MEDICARE ANNUAL WELLNESS EXAM: Status: RESOLVED | Noted: 2018-07-16 | Resolved: 2024-02-19

## 2024-02-19 PROBLEM — Z12.31 SCREENING MAMMOGRAM, ENCOUNTER FOR: Status: RESOLVED | Noted: 2021-07-12 | Resolved: 2024-02-19

## 2024-02-19 PROBLEM — R73.9 HYPERGLYCEMIA: Status: RESOLVED | Noted: 2020-02-12 | Resolved: 2024-02-19

## 2024-02-19 PROBLEM — R53.82 CHRONIC FATIGUE: Status: RESOLVED | Noted: 2017-04-26 | Resolved: 2024-02-19

## 2024-02-19 PROCEDURE — G2211 COMPLEX E/M VISIT ADD ON: HCPCS | Performed by: INTERNAL MEDICINE

## 2024-02-19 PROCEDURE — 1160F RVW MEDS BY RX/DR IN RCRD: CPT | Performed by: INTERNAL MEDICINE

## 2024-02-19 PROCEDURE — 1159F MED LIST DOCD IN RCRD: CPT | Performed by: INTERNAL MEDICINE

## 2024-02-19 PROCEDURE — 99214 OFFICE O/P EST MOD 30 MIN: CPT | Performed by: INTERNAL MEDICINE

## 2024-02-19 PROCEDURE — 3078F DIAST BP <80 MM HG: CPT | Performed by: INTERNAL MEDICINE

## 2024-02-19 PROCEDURE — 3077F SYST BP >= 140 MM HG: CPT | Performed by: INTERNAL MEDICINE

## 2024-02-19 RX ORDER — LOSARTAN POTASSIUM 100 MG/1
100 TABLET ORAL DAILY
Qty: 90 TABLET | Refills: 3 | Status: SHIPPED | OUTPATIENT
Start: 2024-02-19

## 2024-02-19 RX ORDER — HYDROCHLOROTHIAZIDE 12.5 MG/1
12.5 TABLET ORAL DAILY
Qty: 90 TABLET | Refills: 3 | Status: SHIPPED | OUTPATIENT
Start: 2024-02-19

## 2024-02-19 RX ORDER — PANTOPRAZOLE SODIUM 20 MG/1
20 TABLET, DELAYED RELEASE ORAL DAILY
Qty: 90 TABLET | Refills: 3 | Status: SHIPPED | OUTPATIENT
Start: 2024-02-19

## 2024-02-19 NOTE — PROGRESS NOTES
"Rudy Garcia is a 73 y.o. female    HPI coming in for follow-up patient with dementia is accompanied by her daughter who is giving us some of the history since the patient is a poor historian the patient has hypertension and A-fib reasonably compliant with her diet she also follows up with neurology for recurring headaches.  Most recent CT of her brain done about a year ago showed atrophy but no other significant abnormalities    The following portions of the patient's history were reviewed and updated as appropriate: allergies, current medications, past family history, past medical history, past social history, past surgical history, and problem list.     Review of Systems   Constitutional:  Positive for fatigue. Negative for activity change, appetite change and fever.   HENT:  Negative for congestion, ear discharge, ear pain and trouble swallowing.    Eyes:  Negative for photophobia and visual disturbance.   Respiratory:  Negative for cough and shortness of breath.    Cardiovascular:  Negative for chest pain and palpitations.   Gastrointestinal:  Negative for abdominal distention, abdominal pain, constipation, diarrhea, nausea and vomiting.   Endocrine: Negative.    Genitourinary:  Negative for dysuria, hematuria and urgency.   Musculoskeletal:  Positive for arthralgias. Negative for back pain, joint swelling and myalgias.   Skin:  Negative for color change and rash.   Allergic/Immunologic: Negative.    Neurological:  Negative for dizziness, weakness, light-headedness and headaches.   Hematological:  Negative for adenopathy. Does not bruise/bleed easily.   Psychiatric/Behavioral:  Positive for decreased concentration and sleep disturbance. Negative for agitation, confusion and dysphoric mood. The patient is not nervous/anxious.        Visit Vitals  /79   Pulse 69   Temp 96.8 °F (36 °C)   Ht 175.3 cm (69.02\")   Wt 101 kg (223 lb 1.9 oz)   SpO2 93%   BMI 32.93 kg/m²       Objective  Physical " Exam  Constitutional:       General: She is not in acute distress.     Appearance: She is well-developed.   HENT:      Nose: Nose normal.   Eyes:      General: No scleral icterus.     Conjunctiva/sclera: Conjunctivae normal.   Neck:      Thyroid: No thyromegaly.      Trachea: No tracheal deviation.   Cardiovascular:      Rate and Rhythm: Normal rate and regular rhythm.      Heart sounds: No murmur heard.     No friction rub.   Pulmonary:      Effort: No respiratory distress.      Breath sounds: No wheezing or rales.   Abdominal:      General: There is no distension.      Palpations: Abdomen is soft. There is no mass.      Tenderness: There is no abdominal tenderness. There is no guarding.   Musculoskeletal:         General: Deformity present. Normal range of motion.   Lymphadenopathy:      Cervical: No cervical adenopathy.   Skin:     General: Skin is warm and dry.      Findings: No erythema or rash.   Neurological:      Mental Status: She is alert and oriented to person, place, and time.      Cranial Nerves: No cranial nerve deficit.      Coordination: Coordination normal.      Deep Tendon Reflexes: Reflexes are normal and symmetric.   Psychiatric:         Behavior: Behavior normal.         Thought Content: Thought content normal.         Judgment: Judgment normal.       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  Have you had these symptoms before?: No  How long have you been having these  symptoms?: 1-4 days  Please list any medications you are currently taking for this condition.: n/a  Please describe any probable cause for these symptoms. : n/a  Primary Reason for Visit (Submitted on 2/12/2024)  What is the primary reason for your visit?: Other

## 2024-02-26 RX ORDER — FUROSEMIDE 20 MG/1
20 TABLET ORAL TAKE AS DIRECTED
Qty: 36 TABLET | Refills: 3 | Status: SHIPPED | OUTPATIENT
Start: 2024-02-26

## 2024-03-20 RX ORDER — APIXABAN 5 MG/1
TABLET, FILM COATED ORAL
Qty: 180 TABLET | Refills: 3 | Status: SHIPPED | OUTPATIENT
Start: 2024-03-20

## 2024-04-08 ENCOUNTER — PATIENT MESSAGE (OUTPATIENT)
Dept: INTERNAL MEDICINE | Facility: CLINIC | Age: 74
End: 2024-04-08
Payer: MEDICARE

## 2024-04-08 NOTE — TELEPHONE ENCOUNTER
From: Nery Garcia  To: Jackson Guillory  Sent: 4/8/2024 2:54 PM EDT  Subject: Referral Request for Nursing Home for Nery Garcia    My Sister and I with help from the [employer] have begun the process of looking for full-time care for our Mother, Nery Garcia. She continues to decline and we have come to recognize that she needs a level of care that we are unable to provide at home. She is now wearing adult diapers due to both bowel and bladder incontinence and often refuses to bathe. We have a caregiver who stays with her about 30 hours or so per week during the day while we work. The caregiver needs to reduce her hours and we are simply not able to continue on our own with her current needs. We are aware that we need a referral in order to find a facility to care for her. Can you please assist us with a referral?

## 2024-04-16 ENCOUNTER — PATIENT MESSAGE (OUTPATIENT)
Dept: INTERNAL MEDICINE | Facility: CLINIC | Age: 74
End: 2024-04-16
Payer: MEDICARE

## 2024-04-16 RX ORDER — DULOXETIN HYDROCHLORIDE 60 MG/1
60 CAPSULE, DELAYED RELEASE ORAL DAILY
COMMUNITY
Start: 2024-03-15 | End: 2025-03-15

## 2024-04-16 NOTE — TELEPHONE ENCOUNTER
From: Nery Garcia  To: Jackson Guillory  Sent: 4/16/2024 8:38 AM EDT  Subject: Medication Change for Nery Carrion Trupti at  Jasen Mcneil made a medication change on 3.15.24 to try and help Mom's chronic headaches. She has increased the Duloxetine from 30 mg to 60 mg daily. Please adjust your records accordingly. Thank you.

## 2024-04-18 ENCOUNTER — DOCUMENTATION (OUTPATIENT)
Dept: FAMILY MEDICINE CLINIC | Facility: CLINIC | Age: 74
End: 2024-04-18
Payer: MEDICARE

## 2024-04-18 RX ORDER — BISACODYL 10 MG
10 SUPPOSITORY, RECTAL RECTAL 2 TIMES DAILY PRN
COMMUNITY

## 2024-04-18 RX ORDER — POLYETHYLENE GLYCOL 3350 17 G/17G
17 POWDER, FOR SOLUTION ORAL DAILY
COMMUNITY

## 2024-04-18 RX ORDER — FLUOXETINE 10 MG/1
10 CAPSULE ORAL DAILY
COMMUNITY

## 2024-04-18 RX ORDER — SENNOSIDES A AND B 8.6 MG/1
1 TABLET, FILM COATED ORAL 2 TIMES DAILY PRN
COMMUNITY

## 2024-04-18 RX ORDER — ACETAMINOPHEN 325 MG/1
650 TABLET ORAL EVERY 6 HOURS PRN
COMMUNITY

## 2024-07-10 NOTE — DISCHARGE INSTRUCTIONS
Return to the emergency room for any worsening symptoms, sent several medications to your pharmacy, make sure to pick these up and take as directed.  Make sure to follow-up with your primary care in the next several days.  Continue taking all medications as prescribed.   Ultrasound Used Text: High frequency ultrasound depth is 0.98 mm, which is 0.1 mm in difference from previous imaging. Fraction Number: 2 Energy (Kv): 70 Bill For Simulation (Per Medicare, Typical Course Of Radiation Therapy Will Require Between One To Three Simulations): Yes- (Simple- 1 Site: 24432) Ultrasound Not Used Text: Ultrasound was not performed today due to Additional Change To Daily Dosage Administered Mid Treatment?: No Daily Dosage (Cgy): 274.05 Bill For Treatment (17531)?: Yes Calculate Total Cumulative Dose Automatically Or Manually: Manually Total Cumulative Dose (Cgy): 548.1 Treatment Documentation: This patient has been treated today with image-guided superficial radiation therapy for non-melanoma skin cancer. Written informed consent has been previously obtained from this patient for this treatment. This consent is documented in the patient's chart. The patient gave verbal consent to continue treatment today. The patient was treated with a specific radiation dose and setup as prescribed by the provider listed on this visit note. A Radiation Therapist performed administration of radiation under the supervision of a provider. The treatment parameters and cumulative dose are indicated above. Prior to administering the radiation, the patient underwent a verification therapeutic radiology simulation-aided field setting defining relevant normal and abnormal target anatomy and acquiring images with separate and distinct diagnostic high-frequency ultrasound to delineate tissues and determine whether to proceed with delivery of therapeutic, in addition to retrieve data necessary to develop an optimal radiation treatment process for the patient. The field placement simulation documents any change seen in overall tumor volume documented in the patient’s record, allows the clinician to indicate any needed changes in the treatment plan and/or prescription, provides diagnostic evaluation as the basis for performing the therapeutic procedure, and clearly identifies the information needed to decide to proceed with the therapeutic procedure. This process includes verification of the treatment port(s) and proper treatment positioning. All treatment ports were photographed within electronic medical records. The patient's lead blocking along with gross tumor volume and margin was confirmed. Considering superficial radiotherapy is clinical in setup, this requires the physician and radiation therapist to clarify the location interest being treated against initial images, ultrasound, pathology, and patient anatomy. Care was taken to ensure de la cruz treated were geometrically accurate and properly positioned using therapeutic radiology simulation-aided field setting verification per fraction. This process is also utilized to determine if any prescription or setup changes are necessary. These steps are therefore medically necessary to ensure safe and effective administration of radiation. Ongoing therapeutic radiology simulation-aided field setting verification is ordered throughout the course of therapy.\\n\\nA high-frequency ultrasound image was acquired today for a two-dimensional evaluation of the tumor volume, depth, width, breadth, review of prior response to treatment, provide geometric accuracy of field placement, and determine whether to proceed with therapeutic delivery. \\n\\nThe field placement and ultrasound imaging, per fraction, is separate and distinct from the initial simulation and is an important task in providing safe administration of superficial radiation therapy. Physician evaluation of the ultrasound information will be ongoing throughout the course of treatment and is deemed medically necessary to ensure the efficacy of treatment, whether to proceed with therapeutic delivery, and determine any necessary changes. Today's images were evaluated for determination of continuation of treatment with the current plan or with necessary changes as appropriate. Additionally, the use of ultrasound visualization and targeted assessment allows the patient to be able to see their cancer(s) progress, encouraging the patient to complete and maintain compliance through the full course of prescribed radiotherapy. Per Dr. Huynh, continued ultrasound guidance and therapeutic radiology simulation-aided field setting verification per fraction is required for field placement, measurement of tumor depth, tissue evaluation, progress, acute effect monitoring, and determination for therapeutic treatment delivery is appropriate. Add X Modifier?: CHEN - Unusual Non-Overlapping Service Additional Comments (Add Customization Of Note Here): Treatment area is showing a well healed biopsy site. Prescription Used: 1

## 2024-10-11 ENCOUNTER — TELEPHONE (OUTPATIENT)
Dept: CARDIOLOGY | Facility: CLINIC | Age: 74
End: 2024-10-11
Payer: MEDICARE

## 2024-10-11 NOTE — TELEPHONE ENCOUNTER
Mega Kendall MD Jackson, Kristina, RN  24  hr bp 131/53mmhg  Continue current    Pts daughter called with the above results , verbalized understanding   Pt is in memory care

## 2024-11-20 ENCOUNTER — OFFICE VISIT (OUTPATIENT)
Dept: CARDIOLOGY | Facility: CLINIC | Age: 74
End: 2024-11-20
Payer: MEDICARE

## 2024-11-20 ENCOUNTER — PATIENT ROUNDING (BHMG ONLY) (OUTPATIENT)
Dept: CARDIOLOGY | Facility: CLINIC | Age: 74
End: 2024-11-20
Payer: MEDICARE

## 2024-11-20 VITALS
HEART RATE: 80 BPM | DIASTOLIC BLOOD PRESSURE: 70 MMHG | WEIGHT: 236 LBS | SYSTOLIC BLOOD PRESSURE: 132 MMHG | HEIGHT: 66 IN | BODY MASS INDEX: 37.93 KG/M2 | OXYGEN SATURATION: 96 %

## 2024-11-20 DIAGNOSIS — R42 POSTURAL DIZZINESS WITH PRESYNCOPE: Primary | ICD-10-CM

## 2024-11-20 DIAGNOSIS — R55 POSTURAL DIZZINESS WITH PRESYNCOPE: Primary | ICD-10-CM

## 2024-11-20 DIAGNOSIS — E78.2 MIXED HYPERLIPIDEMIA: ICD-10-CM

## 2024-11-20 DIAGNOSIS — I10 PRIMARY HYPERTENSION: ICD-10-CM

## 2024-11-20 PROCEDURE — 99214 OFFICE O/P EST MOD 30 MIN: CPT | Performed by: INTERNAL MEDICINE

## 2024-11-20 PROCEDURE — 3078F DIAST BP <80 MM HG: CPT | Performed by: INTERNAL MEDICINE

## 2024-11-20 PROCEDURE — 3075F SYST BP GE 130 - 139MM HG: CPT | Performed by: INTERNAL MEDICINE

## 2024-11-20 RX ORDER — FLUOXETINE 10 MG/1
10 TABLET, FILM COATED ORAL
COMMUNITY
Start: 2024-08-09

## 2024-11-20 RX ORDER — LORAZEPAM 1 MG/1
1 TABLET ORAL
COMMUNITY
Start: 2024-09-23

## 2024-11-20 RX ORDER — HYDROXYZINE HYDROCHLORIDE 50 MG/1
50 TABLET, FILM COATED ORAL
COMMUNITY
Start: 2024-10-30

## 2024-11-20 RX ORDER — POTASSIUM CHLORIDE 1500 MG/1
20 TABLET, EXTENDED RELEASE ORAL DAILY
COMMUNITY
Start: 2024-10-30

## 2024-11-20 RX ORDER — NITROFURANTOIN 25; 75 MG/1; MG/1
100 CAPSULE ORAL
COMMUNITY
Start: 2024-11-12

## 2024-11-20 NOTE — PROGRESS NOTES
November 20, 2024    Hello, may I speak with Nery Garcia?    My name is MITESH PACE      I am  with [facility]  [address]  [place] [facility] [place] [facility] [address] [phone].    Before we get started may I verify your date of birth? 1950    I am calling to officially welcome you to our practice and ask about your recent visit. Is this a good time to talk? yes    Tell me about your visit with us. What things went well?  EVERYTHING WENT WELL TODAY.        We're always looking for ways to make our patients' experiences even better. Do you have recommendations on ways we may improve?  no    Overall were you satisfied with your first visit to our practice? yes       I appreciate you taking the time to speak with me today. Is there anything else I can do for you? no      Thank you, and have a great day.

## 2024-11-20 NOTE — PROGRESS NOTES
[facility] Cardiology  Office Progress Note  Nery Garcia  1950  [address] [facility] [address]       Visit Date: 11/20/24    PCP: Jackson Guillory MD  [address]  [place] [facility] [place] [address]    IDENTIFICATION: A 74 y.o. female  retired  from [employer] 4/24    PROBLEM LIST:   Carotid artery disease.  Carotid MRA, January 2012, revealing 50% to 75% right ICA stenosis.  April 2018, carotid duplex with 70% stenosis on the right, less than 50% on the left. Dr Jackson saldivar, pt deferred future fu   4/19 CUS <70% bilat  10/20 US carotids: Right ICA: 50-69%.  Left ICA: 0-49%.  Nonobstructive coronary artery disease with normal left ventricular function per cardiac catheterization in October 2012.  Atrial fibrillation.  Initial diagnosis in November 2010.  Previous Multaq therapy - currently on propafenone.  CHADS vasc 2   Pulmonary embolus, January 2012.  Hypertension - labile.  Dyslipidemia.  7/22 155/86/65/74  Insulin resistance  7/22 A1c 5.8  'History of tobacco abuse with cessation in 2006.  Chronic obstructive pulmonary disease.  Gastroesophageal reflux disease.  Peptic ulcer disease noted on EGD, November 2013.  Restless leg syndrome.  Fatty liver per evaluation, November 2013.  Dementia followed per Dr. Chandler at [facility]. 3 sisters with precocious dementia  Questionable sleep apnea.  Surgical history.  Hysterectomy.  Bilateral breast lumpectomies  Cataract extractions       CC:   Chief Complaint   Patient presents with    Coronary Artery Disease    Atrial Fibrillation       Allergies  Allergies   Allergen Reactions    Lisinopril Cough    Other Rash     Contact Metal Agent       Current Medications    Current Outpatient Medications:     acetaminophen (TYLENOL) 325 MG tablet, Take 2 tablets by mouth Every 6 (Six) Hours As Needed for Mild Pain., Disp: , Rfl:     amLODIPine (NORVASC) 10 MG tablet, Take 1 tablet by mouth Daily.,  Disp: 90 tablet, Rfl: 3    atorvastatin (LIPITOR) 40 MG tablet, Take 1 tablet by mouth Daily., Disp: 90 tablet, Rfl: 3    bisacodyl (Dulcolax) 10 MG suppository, Insert 1 suppository into the rectum 2 (Two) Times a Day As Needed for Constipation., Disp: , Rfl:     Cetirizine HCl (ZYRTEC PO), Take 10 mg by mouth Daily., Disp: , Rfl:     donepezil (Aricept) 10 MG tablet, Take 1 tablet by mouth Every Night., Disp: 90 tablet, Rfl: 0    DULoxetine (CYMBALTA) 60 MG capsule, Take 1 capsule by mouth Daily., Disp: , Rfl:     Eliquis 5 MG tablet tablet, TAKE 1 TABLET EVERY 12 HOURS, Disp: 180 tablet, Rfl: 3    FLUoxetine (PROzac) 10 MG tablet, 1 tablet., Disp: , Rfl:     furosemide (LASIX) 20 MG tablet, Take 1 tablet by mouth Take As Directed. TAKE 1 TABLET BY MOUTH ON MONDAY, WEDNESDAY, AND FRIDAY, Disp: 36 tablet, Rfl: 3    hydroCHLOROthiazide 12.5 MG tablet, Take 1 tablet by mouth Daily., Disp: 90 tablet, Rfl: 3    hydrOXYzine (ATARAX) 50 MG tablet, 1 tablet., Disp: , Rfl:     LORazepam (ATIVAN) 1 MG tablet, 1 tablet., Disp: , Rfl:     losartan (COZAAR) 100 MG tablet, Take 1 tablet by mouth Daily., Disp: 90 tablet, Rfl: 3    memantine (NAMENDA) 10 MG tablet, Take 1 tablet by mouth 2 (Two) Times a Day., Disp: , Rfl:     metFORMIN (GLUCOPHAGE) 500 MG tablet, 1 tablet., Disp: , Rfl:     mirtazapine (REMERON) 15 MG tablet, Take 1 tablet by mouth Every Night., Disp: 90 tablet, Rfl: 1    Multiple Vitamin (MULTI-DAY VITAMINS) tablet, Take 1 tablet by mouth Daily., Disp: , Rfl:     nitrofurantoin, macrocrystal-monohydrate, (MACROBID) 100 MG capsule, 1 capsule., Disp: , Rfl:     O2 (OXYGEN), Inhale 1 L/min Every Night., Disp: , Rfl:     pantoprazole (PROTONIX) 20 MG EC tablet, Take 1 tablet by mouth Daily. (Patient taking differently: Take 2 tablets by mouth Daily.), Disp: 90 tablet, Rfl: 3    potassium chloride (KLOR-CON M20) 20 MEQ CR tablet, Take 1 tablet by mouth Daily., Disp: , Rfl:     senna 8.6 MG tablet, Take 1 tablet by  "mouth 2 (Two) Times a Day As Needed for Constipation., Disp: , Rfl:     Sodium Phosphates (PHOSPHATE ENEMA RE), Insert 1 Application into the rectum 2 (Two) Times a Day As Needed., Disp: , Rfl:     sotalol (BETAPACE) 80 MG tablet, TAKE 1/2 TABLET TWICE DAILY, Disp: 90 tablet, Rfl: 1    vitamin B-12 (CYANOCOBALAMIN) 1000 MCG tablet, Take 1 tablet by mouth Daily., Disp: , Rfl:     vitamin D3 125 MCG (5000 UT) capsule capsule, Take 1 capsule by mouth Daily., Disp: , Rfl:     polyethylene glycol (MiraLax) 17 GM/SCOOP powder, Take 17 g by mouth Daily., Disp: , Rfl:       History of Present Illness   Nery Garcia is a 74 y.o. year old female here for follow up.    Accompanied by her daughter that lives next door to her.  Her  memory impairment is as expected worsening  She has been having some presyncope when changes from seated to standing position.    OBJECTIVE:  Vitals:    11/20/24 1306   BP: 132/70   BP Location: Right arm   Patient Position: Sitting   Cuff Size: Adult   Pulse: 80   SpO2: 96%   Weight: 107 kg (236 lb)   Height: 167.6 cm (66\")         Body mass index is 38.09 kg/m².    Constitutional:       Appearance: Healthy appearance. Not in distress.   Neck:      Vascular: No JVR. JVD normal.   Pulmonary:      Effort: Pulmonary effort is normal.      Breath sounds: Normal breath sounds. No wheezing. No rhonchi. No rales.   Chest:      Chest wall: Not tender to palpatation.   Cardiovascular:      PMI at left midclavicular line. Bradycardia present. Regular rhythm. Normal S1. Normal S2.       Murmurs: There is no murmur.      No gallop.  No click. No rub.   Pulses:     Intact distal pulses.   Edema:     Peripheral edema absent.   Abdominal:      General: Bowel sounds are normal.      Palpations: Abdomen is soft.      Tenderness: There is no abdominal tenderness.   Musculoskeletal: Normal range of motion.         General: No tenderness. Skin:     General: Skin is warm and dry.   Neurological:      General: No focal " deficit present.      Mental Status: Alert and oriented to person, place and time.         Diagnostic Data:  Procedures      ASSESSMENT:   Diagnosis Plan   1. Postural dizziness with presyncope        2. Primary hypertension        3. Mixed hyperlipidemia                PLAN:  Paroxysmal A. Fib [id number] 3 maintaining sinus mechanism on half dose sotalol, anticoagulated with apixaban    Hypertension controlled -amlodipine losartan HCTZ    Dyslipidemia controlled on statin therapy    Presyncope on diuretic will discontinue document CBC BMP and allow for permissive hypertension      Mega Kendall MD, FACC

## 2025-01-13 ENCOUNTER — DOCUMENTATION (OUTPATIENT)
Dept: FAMILY MEDICINE CLINIC | Facility: CLINIC | Age: 75
End: 2025-01-13
Payer: MEDICARE

## 2025-01-13 RX ORDER — HYDROCORTISONE 10 MG/G
1 CREAM TOPICAL 2 TIMES DAILY PRN
COMMUNITY

## 2025-01-13 RX ORDER — LORAZEPAM 1 MG/1
1 TABLET ORAL DAILY PRN
Qty: 30 TABLET | Refills: 3 | Status: SHIPPED | OUTPATIENT
Start: 2025-01-13

## 2025-01-13 RX ORDER — HYDROCHLOROTHIAZIDE 12.5 MG/1
12.5 TABLET ORAL DAILY
COMMUNITY
End: 2025-01-14

## 2025-01-13 RX ORDER — QUETIAPINE FUMARATE 25 MG/1
25 TABLET, FILM COATED ORAL NIGHTLY
COMMUNITY

## 2025-01-13 RX ORDER — ATORVASTATIN CALCIUM 40 MG/1
40 TABLET, FILM COATED ORAL DAILY
COMMUNITY

## 2025-01-13 NOTE — TELEPHONE ENCOUNTER
(NEW ADMIT)    VICTOR MANUEL REQUESTING MED REFILL FOR ATIVAN 1 MG.    DIRECTIONS: ATIVAN 1 MG 1 TAB PO QD PRN.

## 2025-01-14 ENCOUNTER — NURSING HOME (OUTPATIENT)
Dept: INTERNAL MEDICINE | Facility: CLINIC | Age: 75
End: 2025-01-14
Payer: MEDICARE

## 2025-01-14 VITALS
DIASTOLIC BLOOD PRESSURE: 74 MMHG | HEIGHT: 68 IN | TEMPERATURE: 97.7 F | BODY MASS INDEX: 34.56 KG/M2 | WEIGHT: 228 LBS | RESPIRATION RATE: 16 BRPM | SYSTOLIC BLOOD PRESSURE: 115 MMHG | HEART RATE: 72 BPM

## 2025-01-14 DIAGNOSIS — J43.8 OTHER EMPHYSEMA: ICD-10-CM

## 2025-01-14 DIAGNOSIS — F02.B18 MODERATE LATE ONSET ALZHEIMER'S DEMENTIA WITH OTHER BEHAVIORAL DISTURBANCE: Primary | ICD-10-CM

## 2025-01-14 DIAGNOSIS — H91.91 HEARING LOSS OF RIGHT EAR, UNSPECIFIED HEARING LOSS TYPE: ICD-10-CM

## 2025-01-14 DIAGNOSIS — G47.33 OBSTRUCTIVE SLEEP APNEA SYNDROME: ICD-10-CM

## 2025-01-14 DIAGNOSIS — G30.1 MODERATE LATE ONSET ALZHEIMER'S DEMENTIA WITH OTHER BEHAVIORAL DISTURBANCE: Primary | ICD-10-CM

## 2025-01-14 DIAGNOSIS — I48.0 PAROXYSMAL ATRIAL FIBRILLATION: ICD-10-CM

## 2025-01-14 DIAGNOSIS — I10 PRIMARY HYPERTENSION: ICD-10-CM

## 2025-01-14 PROCEDURE — 99306 1ST NF CARE HIGH MDM 50: CPT | Performed by: INTERNAL MEDICINE

## 2025-01-14 NOTE — PROGRESS NOTES
Nursing Home History and Physical       Que Johnson DO  [address] [place] [address] Phone: [phone]  Fax: [phone]     PATIENT NAME: Nery Garcia                                                                          YOB: 1950           DATE OF SERVICE: 01/14/2025  FACILITY: [facility]    CHIEF COMPLAINT:  Nursing facility admission    History of Present Illness  Patient is a 73 yo W F with history of dementia with mood disorder, anxiety, depression, hypertension, diabetes mellitus, and atrial fibrillation who has transferred from the [facility] assisted living facility due to increasingly worsening dementia as well as worsening physical debility.  Patient had been requiring more assistance for ADLs than was able to be provided by assisted living.     She was observed to be in a deep sleep during the examination, exhibiting unresponsiveness.  She was not able to provide any detailed history.  Nruses report patient was awake for about 24 hours before she became very sleepy last night and has since been sleeping through today.     MEDICATIONS  Namenda, donepezil, duloxetine, Remeron, fluoxetine, pantoprazole, Seroquel, lorazepam, sotalol, Eliquis, amlodipine, losartan, atorvastatin, metformin, Lasix.       PAST MEDICAL & SURGICAL HISTORY:   Past Medical History:   Diagnosis Date    Acute bronchitis     Acute otitis media     Anxiety     Atrial fibrillation     Body piercing     EARS    CAD (coronary artery disease)     Cardiac insufficiency     Cataract     Has implants    Chest pain     Colitis     COPD (chronic obstructive pulmonary disease)     Dementia     PATIENT REPORTS VERY EARLY STAGES OF THIS.  REPORTS CURRENTLY LIVES BY HERSELF.    Dementia     Depression     Dyslipidemia     Fatty liver     Fatty liver     H/O cardiovascular stress test     PATIENT REPORTS UNSURE WHEN DONE BUT THAT ALL WAS WNL'S     Hearing loss in right ear     REPORTS HAS HEARING  AIDS BUT DOES NOT WEAR THEM ANYMORE    Hepatitis     REPORTS AS A CHILD, UNSURE TYPE    History of esophageal reflux     History of medical problems     Dementia    History of obesity     Hyperlipidemia     Hypertension     Iron deficiency anemia     Moderate Alzheimer's dementia without behavioral disturbance, psychotic disturbance, mood disturbance, or anxiety     Obesity      TRUNKAL    On home oxygen therapy     REPORTS SHE WEARS AT [facility]L [facility]    Otitis externa     PAF (paroxysmal atrial fibrillation)     Peptic ulcer disease     noted on EGD, November 2013    Pneumonia     Pulmonary embolus 01/01/2012    RLS (restless legs syndrome)     Vitamin B 12 deficiency     Vitamin D deficiency     Wears glasses     Wears partial dentures     FULL UPPER PLATE AND PARTIAL LOWER PLATE      Past Surgical History:   Procedure Laterality Date    BREAST EXCISIONAL BIOPSY Bilateral     Patient unsure of dates-     BREAST SURGERY Bilateral     lumpectomies, PATIENT REPORTS BENIGN AND THAT THERE ARE NO RESTRICTIONS ON BUE'S    CATARACT EXTRACTION Bilateral     COLONOSCOPY N/A 09/27/2017    Procedure: COLONOSCOPY WITH COLD BIOPSY POLYPECTOMY; BIOPSIES;  Surgeon: Severiano Calloway MD;  Location: [facility] ENDOSCOPY;  Service:     CORONARY ANGIOPLASTY  10/01/2012    Nonobstructive coronary artery disease with normal left ventricular function     HYSTERECTOMY      OOPHORECTOMY      TOTAL ABDOMINAL HYSTERECTOMY WITH SALPINGO OOPHORECTOMY           MEDICATIONS:  I have reviewed and reconciled the patients medication list in the patients chart at the skilled nursing facility on 01/14/2025.      ALLERGIES:  Allergies   Allergen Reactions    Lisinopril Cough    Other Rash     Contact Metal Agent         SOCIAL HISTORY:  Social History     Socioeconomic History    Marital status:    Tobacco Use    Smoking status: Former     Current packs/day: 0.00     Average packs/day: 3.0 packs/day for 35.0 years (105.0 ttl pk-yrs)     Types: Cigarettes  "    Start date: 1970     Quit date: 2005     Years since quittin.0     Passive exposure: Past    Smokeless tobacco: Never   Vaping Use    Vaping status: Never Used   Substance and Sexual Activity    Alcohol use: Not Currently    Drug use: No    Sexual activity: Not Currently     Partners: Male     Birth control/protection: Abstinence, Hysterectomy       FAMILY HISTORY:  Family History   Problem Relation Age of Onset    Heart attack Mother     Stroke Mother     Arthritis Mother         Crippling arthritis    Hyperlipidemia Mother     Heart attack Father     Stroke Father     Alcohol abuse Father     Hyperlipidemia Father     Heart attack Brother     Hearing loss Brother     Liver disease Sister     Breast cancer Neg Hx     Ovarian cancer Neg Hx     Colon cancer Neg Hx         REVIEW OF SYSTEMS:  Review of Systems   Unable to perform ROS: Dementia         PHYSICAL EXAMINATION:   VITAL SIGNS: /74   Pulse 72   Temp 97.7 °F (36.5 °C)   Resp 16   Ht 172.7 cm (68\")   Wt 103 kg (228 lb)   BMI 34.67 kg/m²     Physical Exam  Vitals and nursing note reviewed.   Constitutional:       Appearance: Normal appearance. She is well-developed. She is obese.   HENT:      Head: Normocephalic and atraumatic.      Nose: Nose normal.      Mouth/Throat:      Mouth: Mucous membranes are moist.      Pharynx: No oropharyngeal exudate.   Eyes:      General: No scleral icterus.     Conjunctiva/sclera: Conjunctivae normal.      Pupils: Pupils are equal, round, and reactive to light.   Neck:      Thyroid: No thyromegaly.   Cardiovascular:      Rate and Rhythm: Normal rate and regular rhythm.      Heart sounds: Normal heart sounds. No murmur heard.     No friction rub. No gallop.   Pulmonary:      Effort: Pulmonary effort is normal. No respiratory distress.      Breath sounds: Normal breath sounds. Wheezing (mild) present.   Abdominal:      General: Bowel sounds are normal. There is no distension.      Palpations: Abdomen " is soft.      Tenderness: There is no abdominal tenderness.   Musculoskeletal:         General: No deformity or signs of injury.      Cervical back: Normal range of motion and neck supple.   Lymphadenopathy:      Cervical: No cervical adenopathy.   Skin:     General: Skin is warm and dry.      Findings: No rash.   Neurological:      Mental Status: She is alert and oriented to person, place, and time.   Psychiatric:      Comments: sleepy         RECORDS REVIEW:   [facility] medical records  Results        ASSESSMENT   Diagnoses and all orders for this visit:    1. Moderate late onset Alzheimer's dementia with other behavioral disturbance (Primary)    2. Paroxysmal atrial fibrillation    3. Primary hypertension    4. Hearing loss of right ear, unspecified hearing loss type    5. Obstructive sleep apnea syndrome    6. Other emphysema        Assessment & Plan  1. Polypharmacy / Oversedation with psychiatric medications.  She was observed to be in a deep sleep during the examination, exhibiting unresponsiveness. She is currently on multiple psychiatric medications, some of which have overlapping functions. It is deemed appropriate to discontinue fluoxetine 10 mg daily. A gradual dose reduction (GDR) of psychiatric medications will be considered over time. Psychiatry may follow up with her closely in the nursing facility.    2. Alzheimer's dementia.  Continue Namenda 10 mg twice daily and donepezil 10 mg nightly.    3. Mood disorder and anxiety.  Continue duloxetine 60 mg daily, Remeron 15 mg nightly, Seroquel 25 mg nightly, and lorazepam 1 mg daily as needed.    4. Gastroesophageal reflux disease (GERD).  Continue pantoprazole 40 mg daily.    5. Atrial fibrillation.  Continue sotalol and Eliquis.    6. Hypertension.  Continue amlodipine 10 mg daily and losartan daily.    7. Hyperlipidemia.  Continue atorvastatin 40 mg daily.    8. Type 2 diabetes mellitus.  Continue metformin 500 mg twice daily..  Admit labs with CBC, CMP,  A1C have been ordered.     9. Diastolic congestive heart failure.  She is stable on current cardiac medications. Continue Lasix 40 mg daily with potassium replacement.     53 min spent with direct patient care, review of medical chart, discussion with nursing staff, and medical decision making.       [x]  Discussed Patient in detail with nursing/staff, addressed all needs today.     [x]  Plan of Care Reviewed   [x]  PT/OT Reviewed   [x]  Order Changes  []  Discharge Plans Reviewed  [x]  Advance Directive on file with Nursing Home.   [x]  POA on file with Nursing Home.    [x]  Code Status listed and reviewed.     Que Johnson DO.  1/15/2025      **Part of this note may be an electronic transcription/translation of spoken language to printed text using the Dragon Dictation System.**    Patient or patient representative verbalized consent for the use of Ambient Listening during the visit with  Que Johnson DO for chart documentation. 1/15/2025  09:48 EST

## 2025-01-14 NOTE — LETTER
Nursing Home History and Physical       Que Johnson DO  [address] [place] [address] Phone: [phone]  Fax: [phone]     PATIENT NAME: Nery Garcia                                                                          YOB: 1950           DATE OF SERVICE: 01/14/2025  FACILITY: [facility]    CHIEF COMPLAINT:  Nursing facility admission    History of Present Illness  Patient is a 73 yo W F with history of dementia with mood disorder, anxiety, depression, hypertension, diabetes mellitus, and atrial fibrillation who has transferred from the [facility] assisted living facility due to increasingly worsening dementia as well as worsening physical debility.  Patient had been requiring more assistance for ADLs than was able to be provided by assisted living.     She was observed to be in a deep sleep during the examination, exhibiting unresponsiveness.  She was not able to provide any detailed history.  Nruses report patient was awake for about 24 hours before she became very sleepy last night and has since been sleeping through today.     MEDICATIONS  Namenda, donepezil, duloxetine, Remeron, fluoxetine, pantoprazole, Seroquel, lorazepam, sotalol, Eliquis, amlodipine, losartan, atorvastatin, metformin, Lasix.       PAST MEDICAL & SURGICAL HISTORY:   Past Medical History:   Diagnosis Date   • Acute bronchitis    • Acute otitis media    • Anxiety    • Atrial fibrillation    • Body piercing     EARS   • CAD (coronary artery disease)    • Cardiac insufficiency    • Cataract     Has implants   • Chest pain    • Colitis    • COPD (chronic obstructive pulmonary disease)    • Dementia     PATIENT REPORTS VERY EARLY STAGES OF THIS.  REPORTS CURRENTLY LIVES BY HERSELF.   • Dementia    • Depression    • Dyslipidemia    • Fatty liver    • Fatty liver    • H/O cardiovascular stress test     PATIENT REPORTS UNSURE WHEN DONE BUT THAT ALL WAS WNL'S    • Hearing loss in right ear      REPORTS HAS HEARING AIDS BUT DOES NOT WEAR THEM ANYMORE   • Hepatitis     REPORTS AS A CHILD, UNSURE TYPE   • History of esophageal reflux    • History of medical problems     Dementia   • History of obesity    • Hyperlipidemia    • Hypertension    • Iron deficiency anemia    • Moderate Alzheimer's dementia without behavioral disturbance, psychotic disturbance, mood disturbance, or anxiety    • Obesity      TRUNKAL   • On home oxygen therapy     REPORTS SHE WEARS AT 1L [facility]   • Otitis externa    • PAF (paroxysmal atrial fibrillation)    • Peptic ulcer disease     noted on EGD, November 2013   • Pneumonia    • Pulmonary embolus 01/01/2012   • RLS (restless legs syndrome)    • Vitamin B 12 deficiency    • Vitamin D deficiency    • Wears glasses    • Wears partial dentures     FULL UPPER PLATE AND PARTIAL LOWER PLATE      Past Surgical History:   Procedure Laterality Date   • BREAST EXCISIONAL BIOPSY Bilateral     Patient unsure of dates-    • BREAST SURGERY Bilateral     lumpectomies, PATIENT REPORTS BENIGN AND THAT THERE ARE NO RESTRICTIONS ON BUE'S   • CATARACT EXTRACTION Bilateral    • COLONOSCOPY N/A 09/27/2017    Procedure: COLONOSCOPY WITH COLD BIOPSY POLYPECTOMY; BIOPSIES;  Surgeon: Severiano Calloway MD;  Location: [facility] ENDOSCOPY;  Service:    • CORONARY ANGIOPLASTY  10/01/2012    Nonobstructive coronary artery disease with normal left ventricular function    • HYSTERECTOMY     • OOPHORECTOMY     • TOTAL ABDOMINAL HYSTERECTOMY WITH SALPINGO OOPHORECTOMY           MEDICATIONS:  I have reviewed and reconciled the patients medication list in the patients chart at the skilled nursing facility on 01/14/2025.      ALLERGIES:  Allergies   Allergen Reactions   • Lisinopril Cough   • Other Rash     Contact Metal Agent         SOCIAL HISTORY:  Social History     Socioeconomic History   • Marital status:    Tobacco Use   • Smoking status: Former     Current packs/day: 0.00     Average packs/day: 3.0 packs/day for  "35.0 years (105.0 ttl pk-yrs)     Types: Cigarettes     Start date: 1970     Quit date: 2005     Years since quittin.0     Passive exposure: Past   • Smokeless tobacco: Never   Vaping Use   • Vaping status: Never Used   Substance and Sexual Activity   • Alcohol use: Not Currently   • Drug use: No   • Sexual activity: Not Currently     Partners: Male     Birth control/protection: Abstinence, Hysterectomy       FAMILY HISTORY:  Family History   Problem Relation Age of Onset   • Heart attack Mother    • Stroke Mother    • Arthritis Mother         Crippling arthritis   • Hyperlipidemia Mother    • Heart attack Father    • Stroke Father    • Alcohol abuse Father    • Hyperlipidemia Father    • Heart attack Brother    • Hearing loss Brother    • Liver disease Sister    • Breast cancer Neg Hx    • Ovarian cancer Neg Hx    • Colon cancer Neg Hx         REVIEW OF SYSTEMS:  Review of Systems   Unable to perform ROS: Dementia         PHYSICAL EXAMINATION:   VITAL SIGNS: /74   Pulse 72   Temp 97.7 °F (36.5 °C)   Resp 16   Ht 172.7 cm (68\")   Wt 103 kg (228 lb)   BMI 34.67 kg/m²     Physical Exam  Vitals and nursing note reviewed.   Constitutional:       Appearance: Normal appearance. She is well-developed. She is obese.   HENT:      Head: Normocephalic and atraumatic.      Nose: Nose normal.      Mouth/Throat:      Mouth: Mucous membranes are moist.      Pharynx: No oropharyngeal exudate.   Eyes:      General: No scleral icterus.     Conjunctiva/sclera: Conjunctivae normal.      Pupils: Pupils are equal, round, and reactive to light.   Neck:      Thyroid: No thyromegaly.   Cardiovascular:      Rate and Rhythm: Normal rate and regular rhythm.      Heart sounds: Normal heart sounds. No murmur heard.     No friction rub. No gallop.   Pulmonary:      Effort: Pulmonary effort is normal. No respiratory distress.      Breath sounds: Normal breath sounds. Wheezing (mild) present.   Abdominal:      General: " Bowel sounds are normal. There is no distension.      Palpations: Abdomen is soft.      Tenderness: There is no abdominal tenderness.   Musculoskeletal:         General: No deformity or signs of injury.      Cervical back: Normal range of motion and neck supple.   Lymphadenopathy:      Cervical: No cervical adenopathy.   Skin:     General: Skin is warm and dry.      Findings: No rash.   Neurological:      Mental Status: She is alert and oriented to person, place, and time.   Psychiatric:      Comments: sleepy         RECORDS REVIEW:   [facility] medical records  Results        ASSESSMENT   Diagnoses and all orders for this visit:    1. Moderate late onset Alzheimer's dementia with other behavioral disturbance (Primary)    2. Paroxysmal atrial fibrillation    3. Primary hypertension    4. Hearing loss of right ear, unspecified hearing loss type    5. Obstructive sleep apnea syndrome    6. Other emphysema        Assessment & Plan  1. Polypharmacy / Oversedation with psychiatric medications.  She was observed to be in a deep sleep during the examination, exhibiting unresponsiveness. She is currently on multiple psychiatric medications, some of which have overlapping functions. It is deemed appropriate to discontinue fluoxetine 10 mg daily. A gradual dose reduction (GDR) of psychiatric medications will be considered over time. Psychiatry may follow up with her closely in the nursing facility.    2. Alzheimer's dementia.  Continue Namenda 10 mg twice daily and donepezil 10 mg nightly.    3. Mood disorder and anxiety.  Continue duloxetine 60 mg daily, Remeron 15 mg nightly, Seroquel 25 mg nightly, and lorazepam 1 mg daily as needed.    4. Gastroesophageal reflux disease (GERD).  Continue pantoprazole 40 mg daily.    5. Atrial fibrillation.  Continue sotalol and Eliquis.    6. Hypertension.  Continue amlodipine 10 mg daily and losartan daily.    7. Hyperlipidemia.  Continue atorvastatin 40 mg daily.    8. Type 2 diabetes  mellitus.  Continue metformin 500 mg twice daily..  Admit labs with CBC, CMP, A1C have been ordered.     9. Diastolic congestive heart failure.  She is stable on current cardiac medications. Continue Lasix 40 mg daily with potassium replacement.     53 min spent with direct patient care, review of medical chart, discussion with nursing staff, and medical decision making.       [x]  Discussed Patient in detail with nursing/staff, addressed all needs today.     [x]  Plan of Care Reviewed   [x]  PT/OT Reviewed   [x]  Order Changes  []  Discharge Plans Reviewed  [x]  Advance Directive on file with Nursing Home.   [x]  POA on file with Nursing Home.    [x]  Code Status listed and reviewed.     Que Johnson DO.  1/15/2025      **Part of this note may be an electronic transcription/translation of spoken language to printed text using the Dragon Dictation System.**    Patient or patient representative verbalized consent for the use of Ambient Listening during the visit with  Que Johnson DO for chart documentation. 1/15/2025  09:48 EST

## 2025-01-28 ENCOUNTER — NURSING HOME (OUTPATIENT)
Dept: FAMILY MEDICINE CLINIC | Facility: CLINIC | Age: 75
End: 2025-01-28
Payer: MEDICARE

## 2025-01-28 VITALS
WEIGHT: 228 LBS | OXYGEN SATURATION: 95 % | SYSTOLIC BLOOD PRESSURE: 145 MMHG | BODY MASS INDEX: 34.56 KG/M2 | DIASTOLIC BLOOD PRESSURE: 66 MMHG | HEART RATE: 109 BPM | TEMPERATURE: 98.1 F | HEIGHT: 68 IN | RESPIRATION RATE: 16 BRPM

## 2025-01-28 DIAGNOSIS — Z74.09 IMPAIRED MOBILITY AND ADLS: ICD-10-CM

## 2025-01-28 DIAGNOSIS — G30.9 MODERATE ALZHEIMER'S DEMENTIA WITHOUT BEHAVIORAL DISTURBANCE, PSYCHOTIC DISTURBANCE, MOOD DISTURBANCE, OR ANXIETY, UNSPECIFIED TIMING OF DEMENTIA ONSET: ICD-10-CM

## 2025-01-28 DIAGNOSIS — J44.1 COPD WITH EXACERBATION: Primary | ICD-10-CM

## 2025-01-28 DIAGNOSIS — F02.B0 MODERATE ALZHEIMER'S DEMENTIA WITHOUT BEHAVIORAL DISTURBANCE, PSYCHOTIC DISTURBANCE, MOOD DISTURBANCE, OR ANXIETY, UNSPECIFIED TIMING OF DEMENTIA ONSET: ICD-10-CM

## 2025-01-28 DIAGNOSIS — Z78.9 IMPAIRED MOBILITY AND ADLS: ICD-10-CM

## 2025-01-28 PROCEDURE — 99309 SBSQ NF CARE MODERATE MDM 30: CPT | Performed by: NURSE PRACTITIONER

## 2025-01-29 NOTE — PROGRESS NOTES
Nursing Home Follow Up Note      Anibal Gonzalez DO []   SHEILA Gaytan [x]  [address] [place] [address]  Phone: [phone]  Fax: [phone] Libertad Perez MD []    uQe Johnson DO []   [address] [place] [address]  Phone: [phone]  Fax: [phone]     PATIENT NAME: Nery Garcia                                                                          YOB: 1950           DATE OF SERVICE: 01/28/2025  FACILITY:  [][facility]   [] [facility]   [] [facility]   [x] [facility]   [] Other ______________________________________________________________________      CHIEF COMPLAINT:    Cough and congestion for the past couple weeks.       HISTORY OF PRESENT ILLNESS:     Nursing reports patient has cough and congestion since admission, CXR negative, but continues to have cough and congestion. Cough is not productive. Does have COPD. No fever, shortness of breath or hypoxia. No signs and symptoms of any distress. No complaints but is a poor historian related to dementia. Sitting in wheelchair in [place], pleasantly confused.     PAST MEDICAL & SURGICAL HISTORY:   Past Medical History:   Diagnosis Date    Acute bronchitis     Acute otitis media     Anxiety     Atrial fibrillation     Body piercing     EARS    CAD (coronary artery disease)     Cardiac insufficiency     Cataract     Has implants    Chest pain     Colitis     COPD (chronic obstructive pulmonary disease)     Dementia     PATIENT REPORTS VERY EARLY STAGES OF THIS.  REPORTS CURRENTLY LIVES BY HERSELF.    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 BUT DOES NOT WEAR THEM ANYMORE    Hepatitis     REPORTS AS A CHILD, UNSURE TYPE    History of esophageal reflux     History of medical problems     Dementia    History of obesity      Hyperlipidemia     Hypertension     Iron deficiency anemia     Moderate Alzheimer's dementia without behavioral disturbance, psychotic disturbance, mood disturbance, or anxiety     Obesity      TRUNKAL    On home oxygen therapy     REPORTS SHE WEARS AT 1L [facility]    Otitis externa     PAF (paroxysmal atrial fibrillation)     Peptic ulcer disease     noted on EGD, 2013    Pneumonia     Pulmonary embolus 2012    RLS (restless legs syndrome)     Vitamin B 12 deficiency     Vitamin D deficiency     Wears glasses     Wears partial dentures     FULL UPPER PLATE AND PARTIAL LOWER PLATE      Past Surgical History:   Procedure Laterality Date    BREAST EXCISIONAL BIOPSY Bilateral     Patient unsure of dates-     BREAST SURGERY Bilateral     lumpectomies, PATIENT REPORTS BENIGN AND THAT THERE ARE NO RESTRICTIONS ON BUE'S    CATARACT EXTRACTION Bilateral     COLONOSCOPY N/A 2017    Procedure: COLONOSCOPY WITH COLD BIOPSY POLYPECTOMY; BIOPSIES;  Surgeon: Severiano Calloway MD;  Location: [facility] ENDOSCOPY;  Service:     CORONARY ANGIOPLASTY  10/01/2012    Nonobstructive coronary artery disease with normal left ventricular function     HYSTERECTOMY      OOPHORECTOMY      TOTAL ABDOMINAL HYSTERECTOMY WITH SALPINGO OOPHORECTOMY           MEDICATIONS:  I have reviewed and reconciled the patients medication list in the patients chart at the skilled nursing facility today.      ALLERGIES:    Allergies   Allergen Reactions    Lisinopril Cough    Other Rash     Contact Metal Agent         SOCIAL HISTORY:    Social History     Socioeconomic History    Marital status:    Tobacco Use    Smoking status: Former     Current packs/day: 0.00     Average packs/day: 3.0 packs/day for 35.0 years (105.0 ttl pk-yrs)     Types: Cigarettes     Start date: 1970     Quit date: 2005     Years since quittin.0     Passive exposure: Past    Smokeless tobacco: Never   Vaping Use    Vaping status: Never Used   Substance and  "Sexual Activity    Alcohol use: Not Currently    Drug use: No    Sexual activity: Not Currently     Partners: Male     Birth control/protection: Abstinence, Hysterectomy       FAMILY HISTORY:    Family History   Problem Relation Age of Onset    Heart attack Mother     Stroke Mother     Arthritis Mother         Crippling arthritis    Hyperlipidemia Mother     Heart attack Father     Stroke Father     Alcohol abuse Father     Hyperlipidemia Father     Heart attack Brother     Hearing loss Brother     Liver disease Sister     Breast cancer Neg Hx     Ovarian cancer Neg Hx     Colon cancer Neg Hx        REVIEW OF SYSTEMS:    Review of Systems   Reason unable to perform ROS: per nursing and patient.   Constitutional:  Negative for activity change, appetite change, fatigue, fever and unexpected weight loss.   HENT:  Positive for congestion. Negative for sore throat.    Eyes:  Negative for discharge and redness.   Respiratory:  Positive for cough. Negative for shortness of breath and wheezing.    Gastrointestinal:  Negative for constipation, diarrhea, nausea and vomiting.   Skin:  Negative for rash.   Neurological:  Positive for memory problem and confusion.   Psychiatric/Behavioral:  Negative for agitation and behavioral problems.          PHYSICAL EXAMINATION:   VITAL SIGNS:   Vitals:    01/28/25 1011   BP: 145/66   Pulse: 109   Resp: 16   Temp: 98.1 °F (36.7 °C)   SpO2: 95%   Weight: 103 kg (228 lb)   Height: 172.7 cm (68\")        Physical Exam  Vitals and nursing note reviewed.   Constitutional:       Appearance: She is well-developed.   HENT:      Head: Normocephalic.   Eyes:      Conjunctiva/sclera: Conjunctivae normal.   Cardiovascular:      Rate and Rhythm: Normal rate and regular rhythm.      Heart sounds: Normal heart sounds.   Pulmonary:      Effort: Pulmonary effort is normal. No respiratory distress.      Breath sounds: Examination of the right-upper field reveals wheezing. Examination of the left-upper field " reveals wheezing. Wheezing present. No decreased breath sounds or rales.   Abdominal:      General: Bowel sounds are normal. There is no distension.      Palpations: Abdomen is soft.      Tenderness: There is no abdominal tenderness.   Musculoskeletal:      Comments: NROM all major joints   Skin:     General: Skin is warm and dry.      Findings: No rash.   Neurological:      Mental Status: She is alert.   Psychiatric:         Mood and Affect: Mood and affect normal.         Behavior: Behavior normal.         Cognition and Memory: Cognition is impaired. Memory is impaired.         RECORDS REVIEW:     I have reviewed records in [facility] and [facility]    ASSESSMENT     Diagnoses and all orders for this visit:    1. COPD with exacerbation (Primary)    2. Moderate Alzheimer's dementia without behavioral disturbance, psychotic disturbance, mood disturbance, or anxiety, unspecified timing of dementia onset    3. Impaired mobility and ADLs        PLAN    COPD exacerbation  -CXR negative for acute findings. Cefdinir 300 mg po bid x 7 days. Schedule Trelegy  puff daily for maintenance, as she is not on anything. Duo Nebs q 6 hours prn. Current cough medication q 8 hours x 7 days then prn.     Nursing encouraged to keep me informed of any acute changes, lack of improvement, or any new concerning symptoms.     Continue supportive care for all ADLs.     Will follow up and continue to monitor.       [x]  Discussed Patient in detail with nursing/staff, addressed all needs today.     [x]  Plan of Care Reviewed   []  PT/OT Reviewed   [x]  Order Changes  []  Discharge Plans Reviewed  [x]  Advance Directive on file with Nursing Home.   [x]  POA on file with Nursing Home.   [x]  Code Status listed: []  Full Code   []  DNR       I spent 35 minutes caring for Novel on this date of service. This time includes time spent by me in the following activities:preparing for the visit, reviewing tests, obtaining and/or reviewing a separately obtained  history, performing a medically appropriate examination and/or evaluation , counseling and educating the patient/family/caregiver, ordering medications, tests, or procedures, referring and communicating with other health care professionals , documenting information in the medical record, and care coordination    I confirm accuracy of unchanged data/findings which have been carried forward from previous visit, as well as I have updated appropriately those that have changed.     Gail Hoover, APRN.

## 2025-02-12 NOTE — TELEPHONE ENCOUNTER
(NEW SCRIPT)    VICTOR MANUEL REQUESTING MED REFILL FOR LORAZEPAM 1 MG.    DIRECTIONS: LORAZEPAM 1 MG 1 TAB PO BID.

## 2025-02-13 RX ORDER — LORAZEPAM 1 MG/1
1 TABLET ORAL 2 TIMES DAILY
Qty: 60 TABLET | Refills: 5 | Status: SHIPPED | OUTPATIENT
Start: 2025-02-13

## 2025-02-17 ENCOUNTER — HOSPITAL ENCOUNTER (INPATIENT)
Facility: HOSPITAL | Age: 75
LOS: 4 days | Discharge: NURSING, REHAB OR LONG-TERM CARE | DRG: 193 | End: 2025-02-21
Attending: STUDENT IN AN ORGANIZED HEALTH CARE EDUCATION/TRAINING PROGRAM | Admitting: FAMILY MEDICINE
Payer: MEDICARE

## 2025-02-17 ENCOUNTER — APPOINTMENT (OUTPATIENT)
Dept: CT IMAGING | Facility: HOSPITAL | Age: 75
DRG: 193 | End: 2025-02-17
Payer: MEDICARE

## 2025-02-17 ENCOUNTER — APPOINTMENT (OUTPATIENT)
Dept: GENERAL RADIOLOGY | Facility: HOSPITAL | Age: 75
DRG: 193 | End: 2025-02-17
Payer: MEDICARE

## 2025-02-17 DIAGNOSIS — J96.02 ACUTE RESPIRATORY FAILURE WITH HYPOXIA AND HYPERCAPNIA: ICD-10-CM

## 2025-02-17 DIAGNOSIS — J96.01 ACUTE RESPIRATORY FAILURE WITH HYPOXIA AND HYPERCAPNIA: ICD-10-CM

## 2025-02-17 DIAGNOSIS — J18.9 PNEUMONIA OF RIGHT LOWER LOBE DUE TO INFECTIOUS ORGANISM: ICD-10-CM

## 2025-02-17 DIAGNOSIS — J10.1 INFLUENZA A: Primary | ICD-10-CM

## 2025-02-17 DIAGNOSIS — J81.0 ACUTE PULMONARY EDEMA: ICD-10-CM

## 2025-02-17 DIAGNOSIS — F03.90 CHRONIC DEMENTIA: ICD-10-CM

## 2025-02-17 LAB
ALBUMIN SERPL-MCNC: 3.6 G/DL (ref 3.5–5.2)
ALBUMIN/GLOB SERPL: 1.3 G/DL
ALP SERPL-CCNC: 81 U/L (ref 39–117)
ALT SERPL W P-5'-P-CCNC: 24 U/L (ref 1–33)
ANION GAP SERPL CALCULATED.3IONS-SCNC: 12.3 MMOL/L (ref 5–15)
AST SERPL-CCNC: 33 U/L (ref 1–32)
ATMOSPHERIC PRESS: 740 MMHG
BASE EXCESS BLDV CALC-SCNC: 0.1 MMOL/L (ref 0–2)
BASOPHILS # BLD AUTO: 0.06 10*3/MM3 (ref 0–0.2)
BASOPHILS NFR BLD AUTO: 0.5 % (ref 0–1.5)
BDY SITE: ABNORMAL
BILIRUB SERPL-MCNC: 0.3 MG/DL (ref 0–1.2)
BUN SERPL-MCNC: 20 MG/DL (ref 8–23)
BUN/CREAT SERPL: 13.1 (ref 7–25)
CALCIUM SPEC-SCNC: 9 MG/DL (ref 8.6–10.5)
CHLORIDE SERPL-SCNC: 100 MMOL/L (ref 98–107)
CO2 SERPL-SCNC: 19.7 MMOL/L (ref 22–29)
COHGB MFR BLD: 1 % (ref 0–5)
CREAT SERPL-MCNC: 1.53 MG/DL (ref 0.57–1)
D-LACTATE SERPL-SCNC: 0.9 MMOL/L (ref 0.5–2)
DEPRECATED RDW RBC AUTO: 44.8 FL (ref 37–54)
EGFRCR SERPLBLD CKD-EPI 2021: 35.8 ML/MIN/1.73
EOSINOPHIL # BLD AUTO: 0.06 10*3/MM3 (ref 0–0.4)
EOSINOPHIL NFR BLD AUTO: 0.5 % (ref 0.3–6.2)
ERYTHROCYTE [DISTWIDTH] IN BLOOD BY AUTOMATED COUNT: 14.1 % (ref 12.3–15.4)
FLUAV RNA RESP QL NAA+PROBE: DETECTED
FLUBV RNA RESP QL NAA+PROBE: NOT DETECTED
GAS FLOW AIRWAY: 4 LPM
GEN 5 1HR TROPONIN T REFLEX: 33 NG/L
GLOBULIN UR ELPH-MCNC: 2.8 GM/DL
GLUCOSE SERPL-MCNC: 204 MG/DL (ref 65–99)
HCO3 BLDV-SCNC: 27.6 MMOL/L (ref 22–28)
HCT VFR BLD AUTO: 34.7 % (ref 34–46.6)
HGB BLD-MCNC: 11.1 G/DL (ref 12–15.9)
IMM GRANULOCYTES # BLD AUTO: 0.06 10*3/MM3 (ref 0–0.05)
IMM GRANULOCYTES NFR BLD AUTO: 0.5 % (ref 0–0.5)
LYMPHOCYTES # BLD AUTO: 1.55 10*3/MM3 (ref 0.7–3.1)
LYMPHOCYTES NFR BLD AUTO: 14.1 % (ref 19.6–45.3)
Lab: ABNORMAL
MCH RBC QN AUTO: 27.8 PG (ref 26.6–33)
MCHC RBC AUTO-ENTMCNC: 32 G/DL (ref 31.5–35.7)
MCV RBC AUTO: 86.8 FL (ref 79–97)
METHGB BLD QL: 0.6 % (ref 0–3)
MODALITY: ABNORMAL
MONOCYTES # BLD AUTO: 0.64 10*3/MM3 (ref 0.1–0.9)
MONOCYTES NFR BLD AUTO: 5.8 % (ref 5–12)
NEUTROPHILS NFR BLD AUTO: 78.6 % (ref 42.7–76)
NEUTROPHILS NFR BLD AUTO: 8.61 10*3/MM3 (ref 1.7–7)
NRBC BLD AUTO-RTO: 0 /100 WBC (ref 0–0.2)
NT-PROBNP SERPL-MCNC: 4446 PG/ML (ref 0–900)
OXYHGB MFR BLDV: 73.7 % (ref 40–70)
PCO2 BLDV: 58.5 MM HG (ref 40–50)
PH BLDV: 7.28 PH UNITS (ref 7.32–7.42)
PLATELET # BLD AUTO: 159 10*3/MM3 (ref 140–450)
PMV BLD AUTO: 10.2 FL (ref 6–12)
PO2 BLDV: 45.4 MM HG (ref 30–50)
POTASSIUM SERPL-SCNC: 4.8 MMOL/L (ref 3.5–5.2)
PROCALCITONIN SERPL-MCNC: 0.1 NG/ML (ref 0–0.25)
PROT SERPL-MCNC: 6.4 G/DL (ref 6–8.5)
RBC # BLD AUTO: 4 10*6/MM3 (ref 3.77–5.28)
SAO2 % BLDCOV: 74.9 % (ref 45–75)
SARS-COV-2 RNA RESP QL NAA+PROBE: NOT DETECTED
SODIUM SERPL-SCNC: 132 MMOL/L (ref 136–145)
TROPONIN T % DELTA: 57
TROPONIN T NUMERIC DELTA: 12 NG/L
TROPONIN T SERPL HS-MCNC: 21 NG/L
VENTILATOR MODE: ABNORMAL
WBC NRBC COR # BLD AUTO: 10.98 10*3/MM3 (ref 3.4–10.8)

## 2025-02-17 PROCEDURE — 71045 X-RAY EXAM CHEST 1 VIEW: CPT

## 2025-02-17 PROCEDURE — 84484 ASSAY OF TROPONIN QUANT: CPT | Performed by: STUDENT IN AN ORGANIZED HEALTH CARE EDUCATION/TRAINING PROGRAM

## 2025-02-17 PROCEDURE — 87636 SARSCOV2 & INF A&B AMP PRB: CPT | Performed by: STUDENT IN AN ORGANIZED HEALTH CARE EDUCATION/TRAINING PROGRAM

## 2025-02-17 PROCEDURE — 85025 COMPLETE CBC W/AUTO DIFF WBC: CPT | Performed by: STUDENT IN AN ORGANIZED HEALTH CARE EDUCATION/TRAINING PROGRAM

## 2025-02-17 PROCEDURE — 25510000001 IOPAMIDOL 61 % SOLUTION: Performed by: STUDENT IN AN ORGANIZED HEALTH CARE EDUCATION/TRAINING PROGRAM

## 2025-02-17 PROCEDURE — 99223 1ST HOSP IP/OBS HIGH 75: CPT | Performed by: FAMILY MEDICINE

## 2025-02-17 PROCEDURE — 83880 ASSAY OF NATRIURETIC PEPTIDE: CPT | Performed by: STUDENT IN AN ORGANIZED HEALTH CARE EDUCATION/TRAINING PROGRAM

## 2025-02-17 PROCEDURE — 83605 ASSAY OF LACTIC ACID: CPT | Performed by: STUDENT IN AN ORGANIZED HEALTH CARE EDUCATION/TRAINING PROGRAM

## 2025-02-17 PROCEDURE — 25010000002 CEFTRIAXONE PER 250 MG: Performed by: STUDENT IN AN ORGANIZED HEALTH CARE EDUCATION/TRAINING PROGRAM

## 2025-02-17 PROCEDURE — 71275 CT ANGIOGRAPHY CHEST: CPT

## 2025-02-17 PROCEDURE — 87040 BLOOD CULTURE FOR BACTERIA: CPT | Performed by: STUDENT IN AN ORGANIZED HEALTH CARE EDUCATION/TRAINING PROGRAM

## 2025-02-17 PROCEDURE — 82820 HEMOGLOBIN-OXYGEN AFFINITY: CPT

## 2025-02-17 PROCEDURE — 36415 COLL VENOUS BLD VENIPUNCTURE: CPT

## 2025-02-17 PROCEDURE — 94640 AIRWAY INHALATION TREATMENT: CPT

## 2025-02-17 PROCEDURE — 94799 UNLISTED PULMONARY SVC/PX: CPT

## 2025-02-17 PROCEDURE — 99291 CRITICAL CARE FIRST HOUR: CPT | Performed by: STUDENT IN AN ORGANIZED HEALTH CARE EDUCATION/TRAINING PROGRAM

## 2025-02-17 PROCEDURE — 93005 ELECTROCARDIOGRAM TRACING: CPT | Performed by: STUDENT IN AN ORGANIZED HEALTH CARE EDUCATION/TRAINING PROGRAM

## 2025-02-17 PROCEDURE — 84145 PROCALCITONIN (PCT): CPT | Performed by: STUDENT IN AN ORGANIZED HEALTH CARE EDUCATION/TRAINING PROGRAM

## 2025-02-17 PROCEDURE — 94660 CPAP INITIATION&MGMT: CPT

## 2025-02-17 PROCEDURE — 80053 COMPREHEN METABOLIC PANEL: CPT | Performed by: STUDENT IN AN ORGANIZED HEALTH CARE EDUCATION/TRAINING PROGRAM

## 2025-02-17 PROCEDURE — 82805 BLOOD GASES W/O2 SATURATION: CPT

## 2025-02-17 PROCEDURE — 70450 CT HEAD/BRAIN W/O DYE: CPT

## 2025-02-17 RX ORDER — OSELTAMIVIR PHOSPHATE 75 MG/1
75 CAPSULE ORAL ONCE
Status: COMPLETED | OUTPATIENT
Start: 2025-02-17 | End: 2025-02-17

## 2025-02-17 RX ORDER — SODIUM CHLORIDE 0.9 % (FLUSH) 0.9 %
10 SYRINGE (ML) INJECTION AS NEEDED
Status: DISCONTINUED | OUTPATIENT
Start: 2025-02-17 | End: 2025-02-21 | Stop reason: HOSPADM

## 2025-02-17 RX ORDER — IPRATROPIUM BROMIDE AND ALBUTEROL SULFATE 2.5; .5 MG/3ML; MG/3ML
3 SOLUTION RESPIRATORY (INHALATION) ONCE
Status: COMPLETED | OUTPATIENT
Start: 2025-02-17 | End: 2025-02-17

## 2025-02-17 RX ORDER — OSELTAMIVIR PHOSPHATE 30 MG/1
30 CAPSULE ORAL EVERY 12 HOURS SCHEDULED
Status: DISCONTINUED | OUTPATIENT
Start: 2025-02-18 | End: 2025-02-21 | Stop reason: HOSPADM

## 2025-02-17 RX ORDER — IOPAMIDOL 612 MG/ML
100 INJECTION, SOLUTION INTRAVASCULAR
Status: COMPLETED | OUTPATIENT
Start: 2025-02-17 | End: 2025-02-17

## 2025-02-17 RX ADMIN — IPRATROPIUM BROMIDE AND ALBUTEROL SULFATE 3 ML: 2.5; .5 SOLUTION RESPIRATORY (INHALATION) at 21:17

## 2025-02-17 RX ADMIN — OSELTAMIVIR PHOSPHATE 75 MG: 75 CAPSULE ORAL at 23:35

## 2025-02-17 RX ADMIN — IOPAMIDOL 100 ML: 612 INJECTION, SOLUTION INTRAVENOUS at 22:18

## 2025-02-17 RX ADMIN — CEFTRIAXONE 1000 MG: 1 INJECTION, POWDER, FOR SOLUTION INTRAMUSCULAR; INTRAVENOUS at 21:53

## 2025-02-18 ENCOUNTER — APPOINTMENT (OUTPATIENT)
Dept: CARDIOLOGY | Facility: HOSPITAL | Age: 75
DRG: 193 | End: 2025-02-18
Payer: MEDICARE

## 2025-02-18 LAB
ANION GAP SERPL CALCULATED.3IONS-SCNC: 11.9 MMOL/L (ref 5–15)
AV MEAN PRESS GRAD SYS DOP V1V2: 5 MMHG
AV VMAX SYS DOP: 148 CM/SEC
BH CV ECHO MEAS - AO MAX PG: 8.8 MMHG
BH CV ECHO MEAS - AO ROOT DIAM: 2.9 CM
BH CV ECHO MEAS - AO V2 VTI: 36.6 CM
BH CV ECHO MEAS - AVA(I,D): 1.9 CM2
BH CV ECHO MEAS - EDV(CUBED): 155.7 ML
BH CV ECHO MEAS - EDV(MOD-SP2): 77.2 ML
BH CV ECHO MEAS - EDV(MOD-SP4): 66.8 ML
BH CV ECHO MEAS - EF(MOD-SP2): 68.8 %
BH CV ECHO MEAS - EF(MOD-SP4): 77.5 %
BH CV ECHO MEAS - ESV(CUBED): 34.3 ML
BH CV ECHO MEAS - ESV(MOD-SP2): 24.1 ML
BH CV ECHO MEAS - ESV(MOD-SP4): 15 ML
BH CV ECHO MEAS - FS: 39.6 %
BH CV ECHO MEAS - IVS/LVPW: 0.83 CM
BH CV ECHO MEAS - IVSD: 0.79 CM
BH CV ECHO MEAS - LA DIMENSION: 3.9 CM
BH CV ECHO MEAS - LAT PEAK E' VEL: 8.9 CM/SEC
BH CV ECHO MEAS - LV DIASTOLIC VOL/BSA (35-75): 31.5 CM2
BH CV ECHO MEAS - LV MASS(C)D: 171.3 GRAMS
BH CV ECHO MEAS - LV MAX PG: 3.5 MMHG
BH CV ECHO MEAS - LV MEAN PG: 2 MMHG
BH CV ECHO MEAS - LV SYSTOLIC VOL/BSA (12-30): 7.1 CM2
BH CV ECHO MEAS - LV V1 MAX: 93.8 CM/SEC
BH CV ECHO MEAS - LV V1 VTI: 23.1 CM
BH CV ECHO MEAS - LVIDD: 5.4 CM
BH CV ECHO MEAS - LVIDS: 3.3 CM
BH CV ECHO MEAS - LVOT AREA: 3 CM2
BH CV ECHO MEAS - LVOT DIAM: 1.96 CM
BH CV ECHO MEAS - LVPWD: 0.95 CM
BH CV ECHO MEAS - MED PEAK E' VEL: 7.7 CM/SEC
BH CV ECHO MEAS - MV A MAX VEL: 84.4 CM/SEC
BH CV ECHO MEAS - MV DEC SLOPE: 416 CM/SEC2
BH CV ECHO MEAS - MV DEC TIME: 0.22 SEC
BH CV ECHO MEAS - MV E MAX VEL: 90.9 CM/SEC
BH CV ECHO MEAS - MV E/A: 1.08
BH CV ECHO MEAS - MV MAX PG: 5.8 MMHG
BH CV ECHO MEAS - MV MEAN PG: 2 MMHG
BH CV ECHO MEAS - MV V2 VTI: 35.4 CM
BH CV ECHO MEAS - MVA(VTI): 1.97 CM2
BH CV ECHO MEAS - PA ACC TIME: 0.17 SEC
BH CV ECHO MEAS - PA V2 MAX: 94.2 CM/SEC
BH CV ECHO MEAS - RAP SYSTOLE: 8 MMHG
BH CV ECHO MEAS - RV MAX PG: 2.8 MMHG
BH CV ECHO MEAS - RV V1 MAX: 84 CM/SEC
BH CV ECHO MEAS - RV V1 VTI: 19.3 CM
BH CV ECHO MEAS - RVSP: 34.3 MMHG
BH CV ECHO MEAS - SV(LVOT): 69.7 ML
BH CV ECHO MEAS - SV(MOD-SP2): 53.1 ML
BH CV ECHO MEAS - SV(MOD-SP4): 51.8 ML
BH CV ECHO MEAS - SVI(LVOT): 32.8 ML/M2
BH CV ECHO MEAS - SVI(MOD-SP2): 25 ML/M2
BH CV ECHO MEAS - SVI(MOD-SP4): 24.4 ML/M2
BH CV ECHO MEAS - TAPSE (>1.6): 3 CM
BH CV ECHO MEAS - TR MAX PG: 26.3 MMHG
BH CV ECHO MEAS - TR MAX VEL: 256.5 CM/SEC
BH CV ECHO MEASUREMENTS AVERAGE E/E' RATIO: 10.95
BH CV XLRA - TDI S': 17 CM/SEC
BUN SERPL-MCNC: 23 MG/DL (ref 8–23)
BUN/CREAT SERPL: 16.1 (ref 7–25)
CALCIUM SPEC-SCNC: 8.6 MG/DL (ref 8.6–10.5)
CHLORIDE SERPL-SCNC: 102 MMOL/L (ref 98–107)
CO2 SERPL-SCNC: 24.1 MMOL/L (ref 22–29)
CREAT SERPL-MCNC: 1.43 MG/DL (ref 0.57–1)
DEPRECATED RDW RBC AUTO: 44.5 FL (ref 37–54)
EGFRCR SERPLBLD CKD-EPI 2021: 38.8 ML/MIN/1.73
ERYTHROCYTE [DISTWIDTH] IN BLOOD BY AUTOMATED COUNT: 14 % (ref 12.3–15.4)
GLUCOSE BLDC GLUCOMTR-MCNC: 124 MG/DL (ref 70–130)
GLUCOSE BLDC GLUCOMTR-MCNC: 125 MG/DL (ref 70–130)
GLUCOSE BLDC GLUCOMTR-MCNC: 134 MG/DL (ref 70–130)
GLUCOSE SERPL-MCNC: 152 MG/DL (ref 65–99)
HCT VFR BLD AUTO: 31 % (ref 34–46.6)
HGB BLD-MCNC: 9.7 G/DL (ref 12–15.9)
LEFT ATRIUM VOLUME INDEX: 26.2 ML/M2
LV EF BIPLANE MOD: 73 %
MCH RBC QN AUTO: 27.2 PG (ref 26.6–33)
MCHC RBC AUTO-ENTMCNC: 31.3 G/DL (ref 31.5–35.7)
MCV RBC AUTO: 86.8 FL (ref 79–97)
MRSA DNA SPEC QL NAA+PROBE: NORMAL
PLATELET # BLD AUTO: 138 10*3/MM3 (ref 140–450)
PMV BLD AUTO: 10.1 FL (ref 6–12)
POTASSIUM SERPL-SCNC: 4.6 MMOL/L (ref 3.5–5.2)
RBC # BLD AUTO: 3.57 10*6/MM3 (ref 3.77–5.28)
SODIUM SERPL-SCNC: 138 MMOL/L (ref 136–145)
WBC NRBC COR # BLD AUTO: 7.15 10*3/MM3 (ref 3.4–10.8)

## 2025-02-18 PROCEDURE — 94799 UNLISTED PULMONARY SVC/PX: CPT

## 2025-02-18 PROCEDURE — 82948 REAGENT STRIP/BLOOD GLUCOSE: CPT | Performed by: INTERNAL MEDICINE

## 2025-02-18 PROCEDURE — 25010000002 CEFTRIAXONE PER 250 MG: Performed by: INTERNAL MEDICINE

## 2025-02-18 PROCEDURE — 87081 CULTURE SCREEN ONLY: CPT | Performed by: FAMILY MEDICINE

## 2025-02-18 PROCEDURE — 25010000002 FUROSEMIDE PER 20 MG: Performed by: FAMILY MEDICINE

## 2025-02-18 PROCEDURE — 85027 COMPLETE CBC AUTOMATED: CPT | Performed by: FAMILY MEDICINE

## 2025-02-18 PROCEDURE — 99232 SBSQ HOSP IP/OBS MODERATE 35: CPT | Performed by: INTERNAL MEDICINE

## 2025-02-18 PROCEDURE — 94664 DEMO&/EVAL PT USE INHALER: CPT

## 2025-02-18 PROCEDURE — 87641 MR-STAPH DNA AMP PROBE: CPT | Performed by: FAMILY MEDICINE

## 2025-02-18 PROCEDURE — 25010000002 AZITHROMYCIN PER 500 MG: Performed by: STUDENT IN AN ORGANIZED HEALTH CARE EDUCATION/TRAINING PROGRAM

## 2025-02-18 PROCEDURE — 87481 CANDIDA DNA AMP PROBE: CPT | Performed by: FAMILY MEDICINE

## 2025-02-18 PROCEDURE — 80048 BASIC METABOLIC PNL TOTAL CA: CPT | Performed by: FAMILY MEDICINE

## 2025-02-18 PROCEDURE — 93306 TTE W/DOPPLER COMPLETE: CPT | Performed by: INTERNAL MEDICINE

## 2025-02-18 PROCEDURE — 94761 N-INVAS EAR/PLS OXIMETRY MLT: CPT

## 2025-02-18 PROCEDURE — 93306 TTE W/DOPPLER COMPLETE: CPT

## 2025-02-18 PROCEDURE — 82948 REAGENT STRIP/BLOOD GLUCOSE: CPT

## 2025-02-18 PROCEDURE — 94660 CPAP INITIATION&MGMT: CPT

## 2025-02-18 PROCEDURE — 25810000003 SODIUM CHLORIDE 0.9 % SOLUTION 250 ML FLEX CONT: Performed by: STUDENT IN AN ORGANIZED HEALTH CARE EDUCATION/TRAINING PROGRAM

## 2025-02-18 RX ORDER — ALUMINA, MAGNESIA, AND SIMETHICONE 2400; 2400; 240 MG/30ML; MG/30ML; MG/30ML
15 SUSPENSION ORAL EVERY 6 HOURS PRN
Status: DISCONTINUED | OUTPATIENT
Start: 2025-02-18 | End: 2025-02-21 | Stop reason: HOSPADM

## 2025-02-18 RX ORDER — IPRATROPIUM BROMIDE AND ALBUTEROL SULFATE 2.5; .5 MG/3ML; MG/3ML
3 SOLUTION RESPIRATORY (INHALATION)
Status: DISCONTINUED | OUTPATIENT
Start: 2025-02-18 | End: 2025-02-21 | Stop reason: HOSPADM

## 2025-02-18 RX ORDER — ACETAMINOPHEN 650 MG/1
650 SUPPOSITORY RECTAL EVERY 4 HOURS PRN
Status: DISCONTINUED | OUTPATIENT
Start: 2025-02-18 | End: 2025-02-21 | Stop reason: HOSPADM

## 2025-02-18 RX ORDER — BISACODYL 5 MG/1
5 TABLET, DELAYED RELEASE ORAL DAILY PRN
Status: DISCONTINUED | OUTPATIENT
Start: 2025-02-18 | End: 2025-02-21 | Stop reason: HOSPADM

## 2025-02-18 RX ORDER — SOTALOL HYDROCHLORIDE 80 MG/1
40 TABLET ORAL EVERY 12 HOURS SCHEDULED
Status: DISCONTINUED | OUTPATIENT
Start: 2025-02-18 | End: 2025-02-21 | Stop reason: HOSPADM

## 2025-02-18 RX ORDER — AMLODIPINE BESYLATE 5 MG/1
10 TABLET ORAL DAILY
Status: DISCONTINUED | OUTPATIENT
Start: 2025-02-18 | End: 2025-02-21 | Stop reason: HOSPADM

## 2025-02-18 RX ORDER — DEXTROSE MONOHYDRATE 25 G/50ML
25 INJECTION, SOLUTION INTRAVENOUS
Status: DISCONTINUED | OUTPATIENT
Start: 2025-02-18 | End: 2025-02-21 | Stop reason: HOSPADM

## 2025-02-18 RX ORDER — NITROGLYCERIN 0.4 MG/1
0.4 TABLET SUBLINGUAL
Status: DISCONTINUED | OUTPATIENT
Start: 2025-02-18 | End: 2025-02-21 | Stop reason: HOSPADM

## 2025-02-18 RX ORDER — GUAIFENESIN 200 MG/10ML
200 LIQUID ORAL 3 TIMES DAILY PRN
COMMUNITY
End: 2025-02-21 | Stop reason: HOSPADM

## 2025-02-18 RX ORDER — MIRTAZAPINE 15 MG/1
15 TABLET, FILM COATED ORAL NIGHTLY
Status: DISCONTINUED | OUTPATIENT
Start: 2025-02-18 | End: 2025-02-21 | Stop reason: HOSPADM

## 2025-02-18 RX ORDER — INSULIN LISPRO 100 [IU]/ML
2-7 INJECTION, SOLUTION INTRAVENOUS; SUBCUTANEOUS
Status: DISCONTINUED | OUTPATIENT
Start: 2025-02-18 | End: 2025-02-21 | Stop reason: HOSPADM

## 2025-02-18 RX ORDER — DULOXETIN HYDROCHLORIDE 30 MG/1
60 CAPSULE, DELAYED RELEASE ORAL DAILY
Status: DISCONTINUED | OUTPATIENT
Start: 2025-02-18 | End: 2025-02-18

## 2025-02-18 RX ORDER — LORAZEPAM 0.5 MG/1
1 TABLET ORAL 2 TIMES DAILY PRN
Status: DISCONTINUED | OUTPATIENT
Start: 2025-02-18 | End: 2025-02-21 | Stop reason: HOSPADM

## 2025-02-18 RX ORDER — PANTOPRAZOLE SODIUM 40 MG/1
40 TABLET, DELAYED RELEASE ORAL
Status: DISCONTINUED | OUTPATIENT
Start: 2025-02-18 | End: 2025-02-21 | Stop reason: HOSPADM

## 2025-02-18 RX ORDER — ONDANSETRON 2 MG/ML
4 INJECTION INTRAMUSCULAR; INTRAVENOUS EVERY 6 HOURS PRN
Status: DISCONTINUED | OUTPATIENT
Start: 2025-02-18 | End: 2025-02-21 | Stop reason: HOSPADM

## 2025-02-18 RX ORDER — FLUTICASONE FUROATE, UMECLIDINIUM BROMIDE AND VILANTEROL TRIFENATATE 100; 62.5; 25 UG/1; UG/1; UG/1
1 POWDER RESPIRATORY (INHALATION)
COMMUNITY

## 2025-02-18 RX ORDER — FUROSEMIDE 10 MG/ML
60 INJECTION INTRAMUSCULAR; INTRAVENOUS ONCE
Status: COMPLETED | OUTPATIENT
Start: 2025-02-18 | End: 2025-02-18

## 2025-02-18 RX ORDER — LOSARTAN POTASSIUM 50 MG/1
100 TABLET ORAL DAILY
Status: DISCONTINUED | OUTPATIENT
Start: 2025-02-18 | End: 2025-02-21 | Stop reason: HOSPADM

## 2025-02-18 RX ORDER — BISACODYL 10 MG
10 SUPPOSITORY, RECTAL RECTAL DAILY PRN
Status: DISCONTINUED | OUTPATIENT
Start: 2025-02-18 | End: 2025-02-21 | Stop reason: HOSPADM

## 2025-02-18 RX ORDER — ONDANSETRON 4 MG/1
4 TABLET, ORALLY DISINTEGRATING ORAL EVERY 6 HOURS PRN
Status: DISCONTINUED | OUTPATIENT
Start: 2025-02-18 | End: 2025-02-21 | Stop reason: HOSPADM

## 2025-02-18 RX ORDER — ATORVASTATIN CALCIUM 40 MG/1
40 TABLET, FILM COATED ORAL DAILY
Status: DISCONTINUED | OUTPATIENT
Start: 2025-02-18 | End: 2025-02-21 | Stop reason: HOSPADM

## 2025-02-18 RX ORDER — CHOLECALCIFEROL (VITAMIN D3) 50 MCG
2000 TABLET ORAL DAILY
COMMUNITY

## 2025-02-18 RX ORDER — GUAIFENESIN AND DEXTROMETHORPHAN HYDROBROMIDE 10; 100 MG/5ML; MG/5ML
10 SYRUP ORAL 3 TIMES DAILY
COMMUNITY

## 2025-02-18 RX ORDER — SODIUM CHLORIDE 9 MG/ML
40 INJECTION, SOLUTION INTRAVENOUS AS NEEDED
Status: DISCONTINUED | OUTPATIENT
Start: 2025-02-18 | End: 2025-02-21 | Stop reason: HOSPADM

## 2025-02-18 RX ORDER — NICOTINE POLACRILEX 4 MG
15 LOZENGE BUCCAL
Status: DISCONTINUED | OUTPATIENT
Start: 2025-02-18 | End: 2025-02-21 | Stop reason: HOSPADM

## 2025-02-18 RX ORDER — QUETIAPINE FUMARATE 25 MG/1
25 TABLET, FILM COATED ORAL NIGHTLY
Status: DISCONTINUED | OUTPATIENT
Start: 2025-02-18 | End: 2025-02-21 | Stop reason: HOSPADM

## 2025-02-18 RX ORDER — AMOXICILLIN 250 MG
2 CAPSULE ORAL 2 TIMES DAILY PRN
Status: DISCONTINUED | OUTPATIENT
Start: 2025-02-18 | End: 2025-02-21 | Stop reason: HOSPADM

## 2025-02-18 RX ORDER — SODIUM CHLORIDE 0.9 % (FLUSH) 0.9 %
10 SYRINGE (ML) INJECTION AS NEEDED
Status: DISCONTINUED | OUTPATIENT
Start: 2025-02-18 | End: 2025-02-21 | Stop reason: HOSPADM

## 2025-02-18 RX ORDER — SODIUM CHLORIDE 0.9 % (FLUSH) 0.9 %
10 SYRINGE (ML) INJECTION EVERY 12 HOURS SCHEDULED
Status: DISCONTINUED | OUTPATIENT
Start: 2025-02-18 | End: 2025-02-21 | Stop reason: HOSPADM

## 2025-02-18 RX ORDER — ACETAMINOPHEN 160 MG/5ML
650 SOLUTION ORAL EVERY 4 HOURS PRN
Status: DISCONTINUED | OUTPATIENT
Start: 2025-02-18 | End: 2025-02-21 | Stop reason: HOSPADM

## 2025-02-18 RX ORDER — ACETAMINOPHEN 325 MG/1
650 TABLET ORAL EVERY 4 HOURS PRN
Status: DISCONTINUED | OUTPATIENT
Start: 2025-02-18 | End: 2025-02-21 | Stop reason: HOSPADM

## 2025-02-18 RX ORDER — POLYETHYLENE GLYCOL 3350 17 G/17G
17 POWDER, FOR SOLUTION ORAL DAILY PRN
Status: DISCONTINUED | OUTPATIENT
Start: 2025-02-18 | End: 2025-02-21 | Stop reason: HOSPADM

## 2025-02-18 RX ORDER — MEMANTINE HYDROCHLORIDE 5 MG/1
10 TABLET ORAL EVERY 12 HOURS SCHEDULED
Status: DISCONTINUED | OUTPATIENT
Start: 2025-02-18 | End: 2025-02-21 | Stop reason: HOSPADM

## 2025-02-18 RX ORDER — FLUOXETINE 10 MG/1
10 CAPSULE ORAL DAILY
Status: DISCONTINUED | OUTPATIENT
Start: 2025-02-18 | End: 2025-02-21 | Stop reason: HOSPADM

## 2025-02-18 RX ADMIN — LORAZEPAM 1 MG: 0.5 TABLET ORAL at 20:28

## 2025-02-18 RX ADMIN — ACETAMINOPHEN 650 MG: 325 TABLET, FILM COATED ORAL at 11:57

## 2025-02-18 RX ADMIN — IPRATROPIUM BROMIDE AND ALBUTEROL SULFATE 3 ML: 2.5; .5 SOLUTION RESPIRATORY (INHALATION) at 12:56

## 2025-02-18 RX ADMIN — MEMANTINE 10 MG: 5 TABLET ORAL at 20:28

## 2025-02-18 RX ADMIN — OSELTAMIVIR PHOSPHATE 30 MG: 30 CAPSULE ORAL at 09:44

## 2025-02-18 RX ADMIN — AMLODIPINE BESYLATE 10 MG: 5 TABLET ORAL at 09:44

## 2025-02-18 RX ADMIN — PANTOPRAZOLE SODIUM 40 MG: 40 TABLET, DELAYED RELEASE ORAL at 06:28

## 2025-02-18 RX ADMIN — FUROSEMIDE 60 MG: 10 INJECTION, SOLUTION INTRAMUSCULAR; INTRAVENOUS at 01:49

## 2025-02-18 RX ADMIN — SOTALOL HYDROCHLORIDE 40 MG: 80 TABLET ORAL at 09:44

## 2025-02-18 RX ADMIN — ATORVASTATIN CALCIUM 40 MG: 40 TABLET, FILM COATED ORAL at 09:44

## 2025-02-18 RX ADMIN — Medication 10 ML: at 09:44

## 2025-02-18 RX ADMIN — MIRTAZAPINE 15 MG: 15 TABLET ORAL at 20:32

## 2025-02-18 RX ADMIN — IPRATROPIUM BROMIDE AND ALBUTEROL SULFATE 3 ML: 2.5; .5 SOLUTION RESPIRATORY (INHALATION) at 07:06

## 2025-02-18 RX ADMIN — ACETAMINOPHEN 650 MG: 325 TABLET, FILM COATED ORAL at 03:29

## 2025-02-18 RX ADMIN — IPRATROPIUM BROMIDE AND ALBUTEROL SULFATE 3 ML: 2.5; .5 SOLUTION RESPIRATORY (INHALATION) at 19:18

## 2025-02-18 RX ADMIN — SODIUM CHLORIDE 2000 MG: 9 INJECTION, SOLUTION INTRAVENOUS at 11:57

## 2025-02-18 RX ADMIN — AZITHROMYCIN DIHYDRATE 500 MG: 500 INJECTION, POWDER, LYOPHILIZED, FOR SOLUTION INTRAVENOUS at 00:06

## 2025-02-18 RX ADMIN — QUETIAPINE FUMARATE 25 MG: 25 TABLET ORAL at 20:32

## 2025-02-18 RX ADMIN — OSELTAMIVIR PHOSPHATE 30 MG: 30 CAPSULE ORAL at 20:28

## 2025-02-18 RX ADMIN — LOSARTAN POTASSIUM 100 MG: 50 TABLET, FILM COATED ORAL at 09:44

## 2025-02-18 RX ADMIN — SOTALOL HYDROCHLORIDE 40 MG: 80 TABLET ORAL at 20:29

## 2025-02-18 RX ADMIN — APIXABAN 5 MG: 5 TABLET, FILM COATED ORAL at 09:43

## 2025-02-18 RX ADMIN — Medication 10 ML: at 20:28

## 2025-02-18 RX ADMIN — QUETIAPINE FUMARATE 25 MG: 25 TABLET ORAL at 03:29

## 2025-02-18 RX ADMIN — MUPIROCIN 1 APPLICATION: 20 OINTMENT TOPICAL at 11:57

## 2025-02-18 RX ADMIN — MIRTAZAPINE 15 MG: 15 TABLET ORAL at 03:29

## 2025-02-18 RX ADMIN — MUPIROCIN 1 APPLICATION: 20 OINTMENT TOPICAL at 20:28

## 2025-02-18 RX ADMIN — MEMANTINE 10 MG: 5 TABLET ORAL at 09:44

## 2025-02-18 RX ADMIN — APIXABAN 5 MG: 5 TABLET, FILM COATED ORAL at 20:29

## 2025-02-18 RX ADMIN — ACETAMINOPHEN 650 MG: 325 TABLET, FILM COATED ORAL at 20:28

## 2025-02-18 RX ADMIN — FLUOXETINE HYDROCHLORIDE 10 MG: 10 CAPSULE ORAL at 09:43

## 2025-02-18 NOTE — PLAN OF CARE
Goal Outcome Evaluation:           Progress: no change   Pt utilized bipap all night for respiratory support without any difficulty RR improved.. Pt pleasantly confused, no indication of emotional distress. BP improved after diueresis. HR WNL. Pt denied presence of pain, no observable indicators noted. Pt does startle if approached to quickly, slow intentional movements paired with verbal description of care in a gentle tone helps Pt to understand care & promote comfort.

## 2025-02-18 NOTE — CASE MANAGEMENT/SOCIAL WORK
Discharge Planning Assessment   [facility]     Patient Name: Nery Garcia  MRN: [id number]  Today's Date: 2/18/2025    Admit Date: 2/17/2025        Discharge Needs Assessment       Row Name 02/18/25 1500       Living Environment    People in Home facility resident    Name(s) of People in Home LCT resident at [facility]    Current Living Arrangements residential facility    Duration at Residence 1 month    Potentially Unsafe Housing Conditions none    In the past 12 months has the electric, gas, oil, or water company threatened to shut off services in your home? No    Primary Care Provided by other (see comments)  LTC at [facility]    Provides Primary Care For no one, unable/limited ability to care for self    Family Caregiver if Needed child(emy), adult    Family Caregiver Names gera Ontiveros or Arianna    Quality of Family Relationships helpful;involved;supportive    Able to Return to Prior Arrangements yes       Resource/Environmental Concerns    Resource/Environmental Concerns none    Transportation Concerns none       Transportation Needs    In the past 12 months, has lack of transportation kept you from medical appointments or from getting medications? no    In the past 12 months, has lack of transportation kept you from meetings, work, or from getting things needed for daily living? No       Food Insecurity    Within the past 12 months, you worried that your food would run out before you got the money to buy more. Never true    Within the past 12 months, the food you bought just didn't last and you didn't have money to get more. Never true       Transition Planning    Patient/Family Anticipates Transition to long-term care facility    Patient/Family Anticipated Services at Transition other (see comments)  Plans to return to LTC    Transportation Anticipated other (see comments)  Will need EMS transport at discharge       Discharge Needs Assessment    Readmission Within the Last 30 Days no previous  admission in last 30 days    Equipment Currently Used at Home rollator;wheelchair;oxygen;grab bar;hospital bed;respiratory supplies    Concerns to be Addressed denies needs/concerns at this time    Do you want help finding or keeping work or a job? I do not need or want help    Do you want help with school or training? For example, starting or completing job training or getting a high school diploma, GED or equivalent No    Anticipated Changes Related to Illness none    Equipment Needed After Discharge none    Discharge Facility/Level of Care Needs nursing facility, intermediate    Current Discharge Risk chronically ill                   Discharge Plan       Row Name 02/18/25 1500       Plan    Plan Comments DCP is to return to LTC at [facility] [place]. Spoke with pt and daughter Jason at bedside. Permission given to discuss DCP with daughters jason or Arianna. Jason confirmed demographics. States yes to Living Will/POA, confirmed it is on file. PCP; Dr. Johnson, pharmacy is [facility]. Declined meds to bed. Pt uses wheelchair, and oxygen 2L n/c at night and PRN. Oxygen provided by [facility] [place]. No new DME needs anticipated at this time. Pt is dependent with ADL's and mobility. Pt will need EMS transport at discharge.                  Continued Care and Services - Admitted Since 2/17/2025    No active coordination exists for this encounter.          Demographic Summary       Row Name 02/18/25 1500       General Information    Admission Type inpatient    Arrived From emergency department    Required Notices Provided Important Message from Medicare    Referral Source admission list    Reason for Consult discharge planning    Preferred Language English       Contact Information    Permission Granted to Share Info With ;family/designee                   Functional Status       Row Name 02/18/25 1500       Functional Status    Usual Activity Tolerance fair    Current Activity Tolerance fair    Functional Status  Comments Pt LTC resident at [facility]. She is wheelchair bound       Physical Activity    On average, how many days per week do you engage in moderate to strenuous exercise (like a brisk walk)? 0 days    On average, how many minutes do you engage in exercise at this level? 0 min    Number of minutes of exercise per week 0       Assessment of Health Literacy    How often do you have someone help you read hospital materials? Always    How often do you have problems learning about your medical condition because of difficulty understanding written information? Always    How often do you have a problem understanding what is told to you about your medical condition? Always    How confident are you filling out medical forms by yourself? Not at all    Health Literacy Moderate       Functional Status, IADL    Medications completely dependent  LTC at [facility]    Meal Preparation completely dependent    Housekeeping completely dependent    Laundry completely dependent    Shopping completely dependent    If for any reason you need help with day-to-day activities such as bathing, preparing meals, shopping, managing finances, etc., do you get the help you need? I get all the help I need       Mental Status    General Appearance WDL WDL       Mental Status Summary    Recent Changes in Mental Status/Cognitive Functioning no changes       Employment/    Employment Status retired                   Psychosocial    No documentation.                  Abuse/Neglect    No documentation.                  Legal    No documentation.                  Substance Abuse    No documentation.                  Patient Forms    No documentation.                     Maribel Lomeli RN

## 2025-02-18 NOTE — H&P
[facility]   HISTORY AND PHYSICAL      Name:  Nery Garcia   Age:  74 y.o.  Sex:  female  :  1950  MRN:  [id number]   Visit Number:  [id number]  Admission Date:  2025  Date Of Service:  25  Primary Care Physician:  Jackson Guillory MD    Chief Complaint:     Shortness of breath    History Of Presenting Illness:      The patient is a 74-year-old with a history of CAD, atrial fibrillation, diastolic CHF, dementia, PE, who had presented from local nursing home facility due to increasing shortness of breath, fever, cough.  Patient was apparently hypoxic at the facility and was sent to our emergency room for further workup.  At time of visit she is awake, alert, she is disoriented but is at baseline per family.  She was able to answer simple questions, she does admit to a cough.  She did not appear in any significant distress at time of visit.  She did have BiPAP on previously but was doing better.  She is unsure of home medications.  History otherwise limited due to dementia.    In the emergency room, she was tachypneic, febrile, and hypoxic.  She had a white count of 11 hemoglobin of 11.  She proBNP of 4500.  Troponin elevation, with a delta up.  Positive influenza.  pH 7.283 with pCO2 of 60 on blood gas.  CT imaging of the chest demonstrated no pulmonary embolism, there was some bilateral groundglass opacities, worse in the right lower lobe with a small right-sided pleural effusion.  CT scan of the head was unremarkable per Radiology interpretation.  Patient received Tamiflu, DuoNeb, Rocephin.  We were asked to admit thereafter    Review Of Systems:    All systems were reviewed and negative except as mentioned in history of presenting illness, assessment and plan.    Past Medical History: Patient  has a past medical history of Acute bronchitis, Acute otitis media, Anxiety, Atrial fibrillation, Body piercing, CAD (coronary artery disease), Cardiac insufficiency, Cataract,  Chest pain, Colitis, COPD (chronic obstructive pulmonary disease), Dementia, Dementia, Depression, Dyslipidemia, Fatty liver, Fatty liver, H/O cardiovascular stress test, Hearing loss in right ear, Hepatitis, History of esophageal reflux, History of medical problems, History of obesity, Hyperlipidemia, Hypertension, Iron deficiency anemia, Moderate Alzheimer's dementia without behavioral disturbance, psychotic disturbance, mood disturbance, or anxiety, Obesity, On home oxygen therapy, Otitis externa, PAF (paroxysmal atrial fibrillation), Peptic ulcer disease, Pneumonia, Pulmonary embolus (01/01/2012), RLS (restless legs syndrome), Vitamin B 12 deficiency, Vitamin D deficiency, Wears glasses, and Wears partial dentures.    Past Surgical History: Patient  has a past surgical history that includes Cataract Extraction (Bilateral); Hysterectomy; Breast excisional biopsy (Bilateral); Oophorectomy; Coronary angioplasty (10/01/2012); Breast surgery (Bilateral); Colonoscopy (N/A, 09/27/2017); and Total abdominal hysterectomy w/ bilateral salpingoophorectomy.    Social History: Patient  reports that she quit smoking about 20 years ago. Her smoking use included cigarettes. She started smoking about 55 years ago. She has a 105 pack-year smoking history. She has been exposed to tobacco smoke. She has never used smokeless tobacco. She reports that she does not currently use alcohol. She reports that she does not use drugs.    Family History:  Patient's family history has been reviewed and found to be noncontributory.     Allergies:      Lisinopril and Other    Home Medications:    Prior to Admission Medications       Prescriptions Last Dose Informant Patient Reported? Taking?    acetaminophen (TYLENOL) 325 MG tablet   Yes No    Take 2 tablets by mouth Every 6 (Six) Hours As Needed for Mild Pain.    amLODIPine (NORVASC) 10 MG tablet   No No    Take 1 tablet by mouth Daily.    atorvastatin (LIPITOR) 40 MG tablet   No No    Take 1  tablet by mouth Daily.    atorvastatin (LIPITOR) 40 MG tablet   Yes No    Take 1 tablet by mouth Daily.    bisacodyl (Dulcolax) 10 MG suppository   Yes No    Insert 1 suppository into the rectum 2 (Two) Times a Day As Needed for Constipation.    Cetirizine HCl (ZYRTEC PO)   Yes No    Take 10 mg by mouth Daily.    donepezil (Aricept) 10 MG tablet   No No    Take 1 tablet by mouth Every Night.    DULoxetine (CYMBALTA) 60 MG capsule   Yes No    Take 1 capsule by mouth Daily.    Eliquis 5 MG tablet tablet   No No    TAKE 1 TABLET EVERY 12 HOURS    FLUoxetine (PROzac) 10 MG tablet   Yes No    1 tablet.    furosemide (LASIX) 20 MG tablet   No No    Take 1 tablet by mouth Take As Directed. TAKE 1 TABLET BY MOUTH ON MONDAY, WEDNESDAY, AND FRIDAY    Patient taking differently:  Take 2 tablets by mouth Daily.    Hydrocortisone, Perianal, (PROCTOCORT) 1 % cream rectal cream   Yes No    Insert 1 Application into the rectum 2 (Two) Times a Day As Needed.    hydrOXYzine (ATARAX) 50 MG tablet   Yes No    Take 1 tablet by mouth Every 8 (Eight) Hours As Needed.    LORazepam (ATIVAN) 1 MG tablet   No No    Take 1 tablet by mouth 2 (Two) Times a Day.    losartan (COZAAR) 100 MG tablet   No No    Take 1 tablet by mouth Daily.    memantine (NAMENDA) 10 MG tablet   Yes No    Take 1 tablet by mouth 2 (Two) Times a Day.    metFORMIN (GLUCOPHAGE) 500 MG tablet   Yes No    1 tablet.    mirtazapine (REMERON) 15 MG tablet   No No    Take 1 tablet by mouth Every Night.    Multiple Vitamin (MULTI-DAY VITAMINS) tablet  Self Yes No    Take 1 tablet by mouth Daily.    multivitamin with minerals (CERTA PLUS SENIOR PO)   Yes No    Take 1 tablet by mouth Daily.    nitrofurantoin, macrocrystal-monohydrate, (MACROBID) 100 MG capsule   Yes No    1 capsule.    O2 (OXYGEN)  Self Yes No    Inhale 1 L/min Every Night.    pantoprazole (PROTONIX) 20 MG EC tablet   No No    Take 1 tablet by mouth Daily.    Patient taking differently:  Take 2 tablets by mouth  "Daily.    polyethylene glycol (MiraLax) 17 GM/SCOOP powder   Yes No    Take 17 g by mouth Daily.    potassium chloride (KLOR-CON M20) 20 MEQ CR tablet   Yes No    Take 1 tablet by mouth Daily.    QUEtiapine (SEROquel) 25 MG tablet   Yes No    Take 1 tablet by mouth Every Night.    senna 8.6 MG tablet   Yes No    Take 1 tablet by mouth Daily As Needed for Constipation.    Sodium Phosphates (PHOSPHATE ENEMA RE)   Yes No    Insert 1 Application into the rectum 2 (Two) Times a Day As Needed.    sotalol (BETAPACE) 80 MG tablet   No No    TAKE 1/2 TABLET TWICE DAILY    vitamin B-12 (CYANOCOBALAMIN) 1000 MCG tablet  Self Yes No    Take 1 tablet by mouth Daily.    vitamin D3 125 MCG (5000 UT) capsule capsule   Yes No    Take 1 capsule by mouth Daily.          ED Medications:    Medications   sodium chloride 0.9 % flush 10 mL (has no administration in time range)   Pharmacy to Dose Oseltamivir (TAMIFLU) (has no administration in time range)   oseltamivir (TAMIFLU) capsule 75 mg (has no administration in time range)   oseltamivir (TAMIFLU) capsule 30 mg (has no administration in time range)   azithromycin (ZITHROMAX) 500 mg in sodium chloride 0.9 % 250 mL IVPB-VTB (has no administration in time range)   ipratropium-albuterol (DUO-NEB) nebulizer solution 3 mL (3 mL Nebulization Given 2/17/25 2117)   cefTRIAXone (ROCEPHIN) 1,000 mg in sodium chloride 0.9 % 100 mL IVPB-VTB (0 mg Intravenous Stopped 2/17/25 2235)   iopamidol (ISOVUE-300) 61 % injection 100 mL (100 mL Intravenous Given 2/17/25 2218)     Vital Signs:  Temp:  [100.8 °F (38.2 °C)] 100.8 °F (38.2 °C)  Heart Rate:  [75-89] 75  Resp:  [29] 29  BP: (148-180)/() 159/75        02/17/25 2135   Weight: 103 kg (227 lb 1.2 oz)     Body mass index is 37.79 kg/m².    Physical Exam:     Most recent vital Signs: /75   Pulse 75   Temp (!) 100.8 °F (38.2 °C) (Axillary)   Resp (!) 29   Ht 165.1 cm (65\")   Wt 103 kg (227 lb 1.2 oz)   SpO2 92%   BMI 37.79 kg/m² " "    Physical Exam  Constitutional:       General: She is not in acute distress.     Appearance: She is ill-appearing.   HENT:      Mouth/Throat:      Mouth: Mucous membranes are dry.   Eyes:      Extraocular Movements: Extraocular movements intact.      Pupils: Pupils are equal, round, and reactive to light.   Cardiovascular:      Rate and Rhythm: Regular rhythm. Tachycardia present.      Pulses: Normal pulses.      Heart sounds: No murmur heard.     No gallop.   Pulmonary:      Breath sounds: Wheezing, rhonchi and rales present.   Abdominal:      General: Bowel sounds are normal. There is no distension.      Tenderness: There is no abdominal tenderness. There is no guarding.   Musculoskeletal:         General: Normal range of motion.      Right lower leg: Edema present.      Left lower leg: Edema present.   Skin:     General: Skin is warm.      Capillary Refill: Capillary refill takes less than 2 seconds.   Neurological:      General: No focal deficit present.      Mental Status: She is alert. Mental status is at baseline. She is disoriented.      Motor: Weakness present.         Laboratory data:    I have reviewed the labs done in the emergency room.    Results from last 7 days   Lab Units 02/17/25  2110   SODIUM mmol/L 132*   POTASSIUM mmol/L 4.8   CHLORIDE mmol/L 100   CO2 mmol/L 19.7*   BUN mg/dL 20   CREATININE mg/dL 1.53*   CALCIUM mg/dL 9.0   BILIRUBIN mg/dL 0.3   ALK PHOS U/L 81   ALT (SGPT) U/L 24   AST (SGOT) U/L 33*   GLUCOSE mg/dL 204*     Results from last 7 days   Lab Units 02/17/25  2110   WBC 10*3/mm3 10.98*   HEMOGLOBIN g/dL 11.1*   HEMATOCRIT % 34.7   PLATELETS 10*3/mm3 159         Results from last 7 days   Lab Units 02/17/25  2226 02/17/25 2110   HSTROP T ng/L 33* 21*     Results from last 7 days   Lab Units 02/17/25 2110   PROBNP pg/mL 4,446.0*                       Invalid input(s): \"USDES\", \"NITRITITE\", \"BACT\", \"EP\"    Pain Management Panel           No data to display                EKG:  "     This appears to be a sinus rhythm with a normal rate.  There is a right bundle branch block.    Radiology:    CT Angiogram Chest Pulmonary Embolism    Result Date: 2/17/2025  FINAL REPORT TECHNIQUE: null CLINICAL HISTORY: Difficulty breathing, hypoxia and fever COMPARISON: null FINDINGS: PROCEDURE: CTA chest with IV contrast. COMPARISON: No relevant priors are available for comparison TECHNIQUE: Axial CT images of the chest were obtained after administration of intravenous contrast. Bilateral oblique MIP and coronal MPR reformations obtained. FINDINGS: Pulmonary arteries: No filling defects to suggest pulmonary embolus. Lungs: Mild ground-glass density foci in the right lower lobe. Dependent bibasilar airspace disease. Pleura: Small right pleural effusion. Aorta: No dissection or aneurysm. Heart: Mild cardiomegaly. No pericardial effusion. Lymph nodes: There is no bulky adenopathy. Bones: No suspicious osseous lesions. Small hiatal hernia. Visualized abdominal contents are unremarkable.     IMPRESSION: No evidence for pulmonary embolism. Ground-glass densities in the right lower lobe which may represent an acute pneumonitis/atypical pneumonia. There is a small right pleural effusion. Authenticated and Electronically Signed by MAXX Krueger MD on 02/17/2025 11:18:30 PM    CT Head Without Contrast    Result Date: 2/17/2025  FINAL REPORT TECHNIQUE: null CLINICAL HISTORY: Fall earlier today, dementia COMPARISON: null FINDINGS: CT HEAD WITHOUT IV CONTRAST: COMPARISON: No relevant priors are available for comparison TECHNIQUE: Axial imaging of the head performed from the skull base to the vertex without IV contrast. Coronal reformations obtained. FINDINGS: There is diffuse volume loss. There is no mass, mass effect or midline shift. No abnormal extra-axial fluid collection or intracranial hemorrhage. There are scattered deep white matter changes. No CT evidence for acute infarction. Visualized paranasal sinuses and  mastoids demonstrate minimal bilateral sphenoid sinus mucosal thickening.There are no skull fractures.     IMPRESSION: No acute intracranial abnormalities. Senescent brain changes. Authenticated and Electronically Signed by MAXX Krueger MD on 02/17/2025 10:54:17 PM     Assessment:    Acute respiratory failure with hypoxia, POA  Influenza  Acute on chronic diastolic CHF  Acute on chronic renal failure stage III  Elevated troponin, suspect type II demand ischemia  Alzheimer's with behavioral disturbance  Paroxysmal atrial fibrillation  CAD    Plan:    Acute respiratory failure with hypoxia/flu:  Continue with oxygen, BiPAP as needed.  Procalcitonin normal, will hold on further antibiotics.  Continue with Tamiflu.  Bronchodilators as needed due to her underlying COPD.    CHF:  She does appear grossly volume overloaded, lower extreme edema and edema on imaging.  Will do trial of Lasix overnight monitor kidney function and urine output.  Reviewed prior echo    Elevated troponin  Most likely demand ischemia in setting of acute respiratory failure.  Will monitor.  Patient without any chest pain complaints    Dementia  Patient at baseline mental status per family.    Further recommendations depend upon clinical course.    Risk Assessment: High  DVT Prophylaxis: Apixaban  Code Status: DNR  Diet: As tolerate    Advance Care Planning   ACP discussion was held with the patient during this visit. Patient has an advance directive in EMR which is still valid.            Karolyn Tan,   02/17/25  23:37 EST    Dictated utilizing Dragon dictation.

## 2025-02-18 NOTE — CASE MANAGEMENT/SOCIAL WORK
Case Management/Social Work    Patient Name:  Nery Garcia  YOB: 1950  MRN: [id number]  Admit Date:  2/17/2025      Pt is a LTC resident at [facility]. Spoke with Iva and confirmed pt is able to return to the facility at discharge.        Electronically signed by:  CADEN Snowden  02/18/25 09:00 EST

## 2025-02-18 NOTE — PROGRESS NOTES
Pharmacokinetic Consult - Oseltamivir Dosing  Nery Garcia is a 74 y.o. female who has been consulted to dose oseltamivir for  influenza A .    Current Antimicrobial Therapy    Anti-Infectives (From admission, onward)      Ordered     Dose/Rate Route Frequency Start Stop    02/18/25 0727  cefTRIAXone (ROCEPHIN) 2,000 mg in sodium chloride 0.9 % 100 mL IVPB-VTB        Ordering Provider: Maicol Gonzales MD    2,000 mg  200 mL/hr over 30 Minutes Intravenous Every 24 Hours 02/18/25 1200 02/22/25 1159    02/17/25 2253  oseltamivir (TAMIFLU) capsule 30 mg        Ordering Provider: Karolyn Tan DO    30 mg Oral Every 12 Hours Scheduled 02/18/25 0900 02/23/25 0859    02/17/25 2330  azithromycin (ZITHROMAX) 500 mg in sodium chloride 0.9 % 250 mL IVPB-VTB  Status:  Discontinued        Ordering Provider: Karolyn Tan DO    500 mg  over 60 Minutes Intravenous Every 24 Hours 02/17/25 2346 02/18/25 0227    02/17/25 2253  oseltamivir (TAMIFLU) capsule 75 mg        Ordering Provider: Kj Agosto MD    75 mg Oral Once 02/17/25 2309 02/17/25 2335    02/17/25 2110  cefTRIAXone (ROCEPHIN) 1,000 mg in sodium chloride 0.9 % 100 mL IVPB-VTB        Ordering Provider: Kj Agosto MD    1,000 mg  200 mL/hr over 30 Minutes Intravenous Once 02/17/25 2126 02/17/25 2235            Microbiology Results (last 10 days)       Procedure Component Value - Date/Time    COVID-19 and FLU A/B PCR, 1 HR TAT - Swab, Nasopharynx [[id number]]  (Abnormal) Collected: 02/17/25 2134    Lab Status: Final result Specimen: Swab from Nasopharynx Updated: 02/17/25 2152     COVID19 Not Detected     Influenza A PCR Detected     Influenza B PCR Not Detected    Narrative:      Fact sheet for providers: https://www.fda.gov/media/857680/download    Fact sheet for patients: https://www.fda.gov/media/536998/download    Test performed by PCR.             Allergies  Lisinopril and Other    Relevant clinical data and objective history  reviewed:  Creatinine   Date Value Ref Range Status   02/18/2025 1.43 (H) 0.57 - 1.00 mg/dL Final     Estimated Creatinine Clearance: 42.6 mL/min (A) (by C-G formula based on SCr of 1.43 mg/dL (H)).  I/O last 3 completed shifts:  In: -   Out: 600 [Urine:600]  Patient weight: 107 kg (236 lb 5.3 oz)    Asessment/Plan  Initiate oseltamivir 75mg PO x 1 dose followed by 30mg PO every 12 hours  Pharmacy will monitor Ms. Garcia's renal function and clinical status and adjust the oseltamivir dose and/or frequency as needed.    Thanks,     Cogn Reyes, PharmD  2/18/2025 08:28 EST

## 2025-02-18 NOTE — PLAN OF CARE
Goal Outcome Evaluation:      Pt tolerating titration of oxygen to 5L Hi Flow nasal cannula/60% NPPV. Only oriented to self but easily reorients to surroundings. PRN tylenol given for fever.     Problem: Noninvasive Ventilation Acute  Goal: Effective Unassisted Ventilation and Oxygenation  Outcome: Progressing     Problem: Adult Inpatient Plan of Care  Goal: Plan of Care Review  Outcome: Progressing  Goal: Patient-Specific Goal (Individualized)  Outcome: Progressing  Goal: Absence of Hospital-Acquired Illness or Injury  Outcome: Progressing  Intervention: Identify and Manage Fall Risk  Recent Flowsheet Documentation  Taken 2/18/2025 1200 by Ritchie Rodriguez RN  Safety Promotion/Fall Prevention:   room organization consistent   safety round/check completed  Taken 2/18/2025 1000 by Ritchie Rodriguez RN  Safety Promotion/Fall Prevention:   room organization consistent   safety round/check completed  Taken 2/18/2025 0800 by Ritchie Rodriguez RN  Safety Promotion/Fall Prevention:   activity supervised   assistive device/personal items within reach   clutter free environment maintained   elopement precautions   fall prevention program maintained   gait belt   lighting adjusted   mobility aid in reach   muscle strengthening facilitated   nonskid shoes/slippers when out of bed   room organization consistent   safety round/check completed  Intervention: Prevent Skin Injury  Recent Flowsheet Documentation  Taken 2/18/2025 1200 by Ritchie Rodriguez RN  Body Position:   left   legs elevated   upper extremity elevated   weight shifting  Taken 2/18/2025 1000 by Ritchie Rodriguez RN  Body Position:   right   legs elevated   upper extremity elevated   weight shifting  Taken 2/18/2025 0800 by Ritchie Rodriguez RN  Body Position:   sitting up in bed   legs elevated   upper extremity elevated   weight shifting  Intervention: Prevent Infection  Recent Flowsheet Documentation  Taken 2/18/2025 1200 by Ritchie Rodriguez RN  Infection Prevention: rest/sleep  promoted  Taken 2/18/2025 1000 by Ritchie Rodriguez RN  Infection Prevention: rest/sleep promoted  Taken 2/18/2025 0800 by Ritchie Rodriguez RN  Infection Prevention: rest/sleep promoted  Goal: Optimal Comfort and Wellbeing  Outcome: Progressing  Goal: Readiness for Transition of Care  Outcome: Progressing     Problem: Skin Injury Risk Increased  Goal: Skin Health and Integrity  Outcome: Progressing  Intervention: Optimize Skin Protection  Recent Flowsheet Documentation  Taken 2/18/2025 1200 by Ritchie Rodriguez RN  Activity Management: activity encouraged  Head of Bed (HOB) Positioning: HOB elevated  Taken 2/18/2025 1000 by Ritchie Rodriguez RN  Activity Management: activity encouraged  Head of Bed (HOB) Positioning: HOB elevated  Taken 2/18/2025 0800 by Ritchie Rodriguez RN  Activity Management: activity encouraged  Pressure Reduction Techniques:   heels elevated off bed   positioned off wounds   pressure points protected   weight shift assistance provided  Head of Bed (HOB) Positioning: HOB elevated  Pressure Reduction Devices:   heel offloading device utilized   positioning supports utilized   pressure-redistributing mattress utilized     Problem: Fall Injury Risk  Goal: Absence of Fall and Fall-Related Injury  Outcome: Progressing  Intervention: Identify and Manage Contributors  Recent Flowsheet Documentation  Taken 2/18/2025 0800 by Ritchie Rodriguez RN  Medication Review/Management:   medications reviewed   high-risk medications identified  Intervention: Promote Injury-Free Environment  Recent Flowsheet Documentation  Taken 2/18/2025 1200 by Ritchie Rodriguez RN  Safety Promotion/Fall Prevention:   room organization consistent   safety round/check completed  Taken 2/18/2025 1000 by Ritchie Rodriguez RN  Safety Promotion/Fall Prevention:   room organization consistent   safety round/check completed  Taken 2/18/2025 0800 by Ritchie Rodriguez RN  Safety Promotion/Fall Prevention:   activity supervised   assistive device/personal items within  reach   clutter free environment maintained   elopement precautions   fall prevention program maintained   gait belt   lighting adjusted   mobility aid in reach   muscle strengthening facilitated   nonskid shoes/slippers when out of bed   room organization consistent   safety round/check completed     Problem: Pneumonia  Goal: Fluid Balance  Outcome: Progressing  Goal: Absence of Infection Signs and Symptoms  Outcome: Progressing  Goal: Effective Oxygenation and Ventilation  Outcome: Progressing  Intervention: Promote Airway Secretion Clearance  Recent Flowsheet Documentation  Taken 2/18/2025 1200 by Ritchie Rodriguez RN  Activity Management: activity encouraged  Taken 2/18/2025 1000 by Ritchie Rodriguez RN  Activity Management: activity encouraged  Taken 2/18/2025 0800 by Ritchie Rodriguez RN  Activity Management: activity encouraged  Cough And Deep Breathing: done independently per patient  Intervention: Optimize Oxygenation and Ventilation  Recent Flowsheet Documentation  Taken 2/18/2025 1200 by Ritchie Rodriguez RN  Head of Bed (HOB) Positioning: HOB elevated  Taken 2/18/2025 1000 by Ritchie Rodriguez RN  Head of Bed (HOB) Positioning: HOB elevated  Taken 2/18/2025 0800 by Ritchie Rodriguez RN  Head of Bed (HOB) Positioning: HOB elevated     Problem: Hypertension Acute  Goal: Blood Pressure Within Desired Range  Outcome: Progressing  Intervention: Normalize Blood Pressure  Recent Flowsheet Documentation  Taken 2/18/2025 0800 by Ritchie Rodriguez RN  Medication Review/Management:   medications reviewed   high-risk medications identified     Problem: Pain Chronic (Persistent)  Goal: Optimal Pain Control and Function  Outcome: Progressing  Intervention: Manage Persistent Pain  Recent Flowsheet Documentation  Taken 2/18/2025 0800 by Ritchie Rodriguez RN  Medication Review/Management:   medications reviewed   high-risk medications identified     Problem: Dysrhythmia  Goal: Normalized Cardiac Rhythm  Outcome: Progressing

## 2025-02-18 NOTE — ED PROVIDER NOTES
[facility] EMERGENCY DEPARTMENT  Emergency Department Encounter  Emergency Medicine Physician Note       Pt Name: Nery Garcia  MRN: [id number]  Pt :   1950  Room Number:    Date of encounter:  2025  PCP: Jackson Guillory MD  ED Provider: Kj Agosto MD    Historian: EMS and patient's daughter as independent historians.      HPI:  Chief Complaint: Flulike illness        Context: Nery Garcia is a 74 y.o. female who presents to the ED c/o flulike illness.  Patient has been sick with cough, shortness of air, and fever today.  Also had fall at nursing care facility but was not sent to emergency department afterwards as patient was behaving as normally.  Patient has severe dementia and is unable to provide any history or self.  Fever on arrival and has had productive cough.  Nursing home also reported patient's oxygen saturations were low and that is why they finally sent her to the emergency department tonight.      PAST MEDICAL HISTORY  Past Medical History:   Diagnosis Date    Acute bronchitis     Acute otitis media     Anxiety     Atrial fibrillation     Body piercing     EARS    CAD (coronary artery disease)     Cardiac insufficiency     Cataract     Has implants    Chest pain     Colitis     COPD (chronic obstructive pulmonary disease)     Dementia     PATIENT REPORTS VERY EARLY STAGES OF THIS.  REPORTS CURRENTLY LIVES BY HERSELF.    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 BUT DOES NOT WEAR THEM ANYMORE    Hepatitis     REPORTS AS A CHILD, UNSURE TYPE    History of esophageal reflux     History of medical problems     Dementia    History of obesity     Hyperlipidemia     Hypertension     Iron deficiency anemia     Moderate Alzheimer's dementia without behavioral disturbance, psychotic  disturbance, mood disturbance, or anxiety     Obesity      TRUNKAL    On home oxygen therapy     REPORTS SHE WEARS AT [facility]L [facility]    Otitis externa     PAF (paroxysmal atrial fibrillation)     Peptic ulcer disease     noted on EGD, 2013    Pneumonia     Pulmonary embolus 2012    RLS (restless legs syndrome)     Vitamin B 12 deficiency     Vitamin D deficiency     Wears glasses     Wears partial dentures     FULL UPPER PLATE AND PARTIAL LOWER PLATE         PAST SURGICAL HISTORY  Past Surgical History:   Procedure Laterality Date    BREAST EXCISIONAL BIOPSY Bilateral     Patient unsure of dates-     BREAST SURGERY Bilateral     lumpectomies, PATIENT REPORTS BENIGN AND THAT THERE ARE NO RESTRICTIONS ON BUE'S    CATARACT EXTRACTION Bilateral     COLONOSCOPY N/A 2017    Procedure: COLONOSCOPY WITH COLD BIOPSY POLYPECTOMY; BIOPSIES;  Surgeon: Severiano Calloway MD;  Location: [facility] ENDOSCOPY;  Service:     CORONARY ANGIOPLASTY  10/01/2012    Nonobstructive coronary artery disease with normal left ventricular function     HYSTERECTOMY      OOPHORECTOMY      TOTAL ABDOMINAL HYSTERECTOMY WITH SALPINGO OOPHORECTOMY           FAMILY HISTORY  Family History   Problem Relation Age of Onset    Heart attack Mother     Stroke Mother     Arthritis Mother         Crippling arthritis    Hyperlipidemia Mother     Heart attack Father     Stroke Father     Alcohol abuse Father     Hyperlipidemia Father     Heart attack Brother     Hearing loss Brother     Liver disease Sister     Breast cancer Neg Hx     Ovarian cancer Neg Hx     Colon cancer Neg Hx          SOCIAL HISTORY  Social History     Socioeconomic History    Marital status:    Tobacco Use    Smoking status: Former     Current packs/day: 0.00     Average packs/day: 3.0 packs/day for 35.0 years (105.0 ttl pk-yrs)     Types: Cigarettes     Start date: 1970     Quit date: 2005     Years since quittin.1     Passive exposure: Past    Smokeless  tobacco: Never   Vaping Use    Vaping status: Never Used   Substance and Sexual Activity    Alcohol use: Not Currently    Drug use: No    Sexual activity: Not Currently     Partners: Male     Birth control/protection: Abstinence, Hysterectomy         ALLERGIES  Lisinopril and Other        REVIEW OF SYSTEMS  Review of Systems     All systems reviewed and negative except for those discussed in HPI.       PHYSICAL EXAM    I have reviewed the triage vital signs and nursing notes.    ED Triage Vitals   Temp Heart Rate Resp BP SpO2   02/17/25 2057 02/17/25 2057 02/17/25 2057 02/17/25 2059 02/17/25 2057   (!) 100.8 °F (38.2 °C) 89 (!) 29 180/86 90 %      Temp src Heart Rate Source Patient Position BP Location FiO2 (%)   02/17/25 2057 -- 02/17/25 2059 02/17/25 2059 --   Axillary  Lying Right arm        Physical Exam    General:  Awake, alert, mildly increased work of breathing on exam  HEENT: Atraumatic, normocephalic, EOMI, PERRLA, mucous membranes moist  NECK:  Supple, atraumatic  Cardiovascular:  Regular rate, regular rhythm, no murmurs, rubs, or gallops.  Extremities well perfused   Respiratory: Diffuse rhonchi throughout all lung fields.  Tachypneic.  Abdominal:  Soft, nondistended, nontender.  No guarding or rebound.  No palpable masses  Extremity:  No visible bony abnormalities in all 4 extremities.  Full range of motion of all extremities.  Skin:  Warm and dry.  No rashes  Neuro: Awake and oriented to self.  Able to follow basic commands.  Psych:  Mood and affect appropriate.        LAB RESULTS  Recent Results (from the past 24 hours)   Comprehensive Metabolic Panel    Collection Time: 02/17/25  9:10 PM    Specimen: Blood   Result Value Ref Range    Glucose 204 (H) 65 - 99 mg/dL    BUN 20 8 - 23 mg/dL    Creatinine 1.53 (H) 0.57 - 1.00 mg/dL    Sodium 132 (L) 136 - 145 mmol/L    Potassium 4.8 3.5 - 5.2 mmol/L    Chloride 100 98 - 107 mmol/L    CO2 19.7 (L) 22.0 - 29.0 mmol/L    Calcium 9.0 8.6 - 10.5 mg/dL    Total  Protein 6.4 6.0 - 8.5 g/dL    Albumin 3.6 3.5 - 5.2 g/dL    ALT (SGPT) 24 1 - 33 U/L    AST (SGOT) 33 (H) 1 - 32 U/L    Alkaline Phosphatase 81 39 - 117 U/L    Total Bilirubin 0.3 0.0 - 1.2 mg/dL    Globulin 2.8 gm/dL    A/G Ratio 1.3 g/dL    BUN/Creatinine Ratio 13.1 7.0 - 25.0    Anion Gap 12.3 5.0 - 15.0 mmol/L    eGFR 35.8 (L) >60.0 mL/min/1.73   BNP    Collection Time: 02/17/25  9:10 PM    Specimen: Blood   Result Value Ref Range    proBNP 4,446.0 (H) 0.0 - 900.0 pg/mL   High Sensitivity Troponin T    Collection Time: 02/17/25  9:10 PM    Specimen: Blood   Result Value Ref Range    HS Troponin T 21 (H) <14 ng/L   CBC Auto Differential    Collection Time: 02/17/25  9:10 PM    Specimen: Blood   Result Value Ref Range    WBC 10.98 (H) 3.40 - 10.80 10*3/mm3    RBC 4.00 3.77 - 5.28 10*6/mm3    Hemoglobin 11.1 (L) 12.0 - 15.9 g/dL    Hematocrit 34.7 34.0 - 46.6 %    MCV 86.8 79.0 - 97.0 fL    MCH 27.8 26.6 - 33.0 pg    MCHC 32.0 31.5 - 35.7 g/dL    RDW 14.1 12.3 - 15.4 %    RDW-SD 44.8 37.0 - 54.0 fl    MPV 10.2 6.0 - 12.0 fL    Platelets 159 140 - 450 10*3/mm3    Neutrophil % 78.6 (H) 42.7 - 76.0 %    Lymphocyte % 14.1 (L) 19.6 - 45.3 %    Monocyte % 5.8 5.0 - 12.0 %    Eosinophil % 0.5 0.3 - 6.2 %    Basophil % 0.5 0.0 - 1.5 %    Immature Grans % 0.5 0.0 - 0.5 %    Neutrophils, Absolute 8.61 (H) 1.70 - 7.00 10*3/mm3    Lymphocytes, Absolute 1.55 0.70 - 3.10 10*3/mm3    Monocytes, Absolute 0.64 0.10 - 0.90 10*3/mm3    Eosinophils, Absolute 0.06 0.00 - 0.40 10*3/mm3    Basophils, Absolute 0.06 0.00 - 0.20 10*3/mm3    Immature Grans, Absolute 0.06 (H) 0.00 - 0.05 10*3/mm3    nRBC 0.0 0.0 - 0.2 /100 WBC   Lactic Acid, Plasma    Collection Time: 02/17/25  9:10 PM    Specimen: Blood   Result Value Ref Range    Lactate 0.9 0.5 - 2.0 mmol/L   Procalcitonin    Collection Time: 02/17/25  9:30 PM    Specimen: Blood   Result Value Ref Range    Procalcitonin 0.10 0.00 - 0.25 ng/mL   COVID-19 and FLU A/B PCR, 1 HR TAT - Swab,  Nasopharynx    Collection Time: 02/17/25  9:34 PM    Specimen: Nasopharynx; Swab   Result Value Ref Range    COVID19 Not Detected Not Detected - Ref. Range    Influenza A PCR Detected (A) Not Detected    Influenza B PCR Not Detected Not Detected   Blood Gas, Venous With Co-Ox    Collection Time: 02/17/25  9:35 PM    Specimen: Venous Blood   Result Value Ref Range    Site OTHER     pH, Venous 7.283 (C) 7.320 - 7.420 pH Units    pCO2, Venous 58.5 (H) 40.0 - 50.0 mm Hg    pO2, Venous 45.4 30.0 - 50.0 mm Hg    HCO3, Venous 27.6 22.0 - 28.0 mmol/L    Base Excess, Venous 0.1 0.0 - 2.0 mmol/L    O2 Saturation, Venous 74.9 45.0 - 75.0 %    Oxyhemoglobin Venous 73.7 (H) 40.0 - 70.0 %    Methemoglobin Venous 0.6 0.0 - 3.0 %    Carboxyhemoglobin Venous 1.0 0.0 - 5.0 %    Barometric Pressure for Blood Gas 740 mmHg    Modality Nasal Cannula     Flow Rate 4.0 lpm    Ventilator Mode NA     Collected by LAB    High Sensitivity Troponin T 1Hr    Collection Time: 02/17/25 10:26 PM    Specimen: Blood   Result Value Ref Range    HS Troponin T 33 (H) <14 ng/L    Troponin T Numeric Delta 12 (C) Abnormal if >/=3 ng/L    Troponin T % Delta 57 (C) Abnormal if >/= 20%       If labs were ordered, I independently evaluated the results and considered them in treating the patient.        RADIOLOGY  CT Angiogram Chest Pulmonary Embolism    Result Date: 2/17/2025  FINAL REPORT TECHNIQUE: null CLINICAL HISTORY: Difficulty breathing, hypoxia and fever COMPARISON: null FINDINGS: PROCEDURE: CTA chest with IV contrast. COMPARISON: No relevant priors are available for comparison TECHNIQUE: Axial CT images of the chest were obtained after administration of intravenous contrast. Bilateral oblique MIP and coronal MPR reformations obtained. FINDINGS: Pulmonary arteries: No filling defects to suggest pulmonary embolus. Lungs: Mild ground-glass density foci in the right lower lobe. Dependent bibasilar airspace disease. Pleura: Small right pleural effusion.  Aorta: No dissection or aneurysm. Heart: Mild cardiomegaly. No pericardial effusion. Lymph nodes: There is no bulky adenopathy. Bones: No suspicious osseous lesions. Small hiatal hernia. Visualized abdominal contents are unremarkable.     IMPRESSION: No evidence for pulmonary embolism. Ground-glass densities in the right lower lobe which may represent an acute pneumonitis/atypical pneumonia. There is a small right pleural effusion. Authenticated and Electronically Signed by MAXX Krueger MD on 02/17/2025 11:18:30 PM    CT Head Without Contrast    Result Date: 2/17/2025  FINAL REPORT TECHNIQUE: null CLINICAL HISTORY: Fall earlier today, dementia COMPARISON: null FINDINGS: CT HEAD WITHOUT IV CONTRAST: COMPARISON: No relevant priors are available for comparison TECHNIQUE: Axial imaging of the head performed from the skull base to the vertex without IV contrast. Coronal reformations obtained. FINDINGS: There is diffuse volume loss. There is no mass, mass effect or midline shift. No abnormal extra-axial fluid collection or intracranial hemorrhage. There are scattered deep white matter changes. No CT evidence for acute infarction. Visualized paranasal sinuses and mastoids demonstrate minimal bilateral sphenoid sinus mucosal thickening.There are no skull fractures.     IMPRESSION: No acute intracranial abnormalities. Senescent brain changes. Authenticated and Electronically Signed by MAXX Krueger MD on 02/17/2025 10:54:17 PM     I ordered and independently evaluated the above noted radiographic studies.     See radiologist's dictation for official interpretation.        PROCEDURES    Critical Care    Performed by: Kj Agosto MD  Authorized by: Karolyn Tan DO    Critical care provider statement:     Critical care time (minutes):  38    Critical care time was exclusive of:  Separately billable procedures and treating other patients and teaching time    Critical care was necessary to treat or prevent  imminent or life-threatening deterioration of the following conditions:  Respiratory failure    Critical care was time spent personally by me on the following activities:  Examination of patient, development of treatment plan with patient or surrogate, obtaining history from patient or surrogate, evaluation of patient's response to treatment, re-evaluation of patient's condition, pulse oximetry, ordering and review of radiographic studies, ordering and review of laboratory studies and ordering and performing treatments and interventions    I assumed direction of critical care for this patient from another provider in my specialty: no      Care discussed with: admitting provider        ECG 12 Lead Dyspnea   Final Result          MEDICATIONS GIVEN IN ER    Medications   sodium chloride 0.9 % flush 10 mL (has no administration in time range)   Pharmacy to Dose Oseltamivir (TAMIFLU) (has no administration in time range)   oseltamivir (TAMIFLU) capsule 75 mg (has no administration in time range)   oseltamivir (TAMIFLU) capsule 30 mg (has no administration in time range)   azithromycin (ZITHROMAX) 500 mg in sodium chloride 0.9 % 250 mL IVPB-VTB (has no administration in time range)   ipratropium-albuterol (DUO-NEB) nebulizer solution 3 mL (3 mL Nebulization Given 2/17/25 2117)   cefTRIAXone (ROCEPHIN) 1,000 mg in sodium chloride 0.9 % 100 mL IVPB-VTB (0 mg Intravenous Stopped 2/17/25 2235)   iopamidol (ISOVUE-300) 61 % injection 100 mL (100 mL Intravenous Given 2/17/25 2218)         MEDICAL DECISION MAKING, PROGRESS, and CONSULTS    All labs, if obtained, have been independently reviewed by me.  All radiology studies, if obtained, have been evaluated by me and the radiologist dictating the report.  All EKG's, if obtained, have been independently viewed and interpreted by me.      Discussion below represents my analysis of pertinent findings related to patient's condition, differential diagnosis, treatment plan and final  disposition.    Nery Garcia is a 74 y.o. female who presents to the ED c/o flulike illness.  Febrile and tachypneic on arrival with significant audible rhonchi.  Oxygen saturation of 89 to 90% on room air but was placed on nasal cannula for work of breathing/comfort.  Differential includes was not limited to pneumonia, pulmonary edema, COVID-19, influenza, pleural effusion.  Extensive labs ordered along with chest x-ray and EKG.    EKG personally interpreted showing sinus rhythm with right bundle branch block with rate of 87 but no evidence of acute ischemic change/STEMI.    Due to patient's fever and tachypnea concern for sepsis, blood cultures and serum lactate added on.  Patient empirically given 1 g of IV Rocephin.    Chest x-ray obtained which was personally visualized and interpreted showing bilateral opacifications worse in the lower lung fields consistent with possible pulmonary edema versus pneumonia.    Labs personally interpreted showing leukocytosis of 10.9.  Respiratory acidosis with pH of 7.28 and elevated pCO2 of 58.5.  Normal serum lactate.  Significantly elevated proBNP of 4446, no history of CHF reported per daughter.  Influenza A positive.  Mildly elevated initial troponin of 21.  Repeat troponin increased to 33.    Due to patient's increased work of breathing along with significant pulmonary edema on chest x-ray patient to be trialed on BiPAP/CPAP.  Respiratory therapy was contacted for assistance.    Given patient's recent fall/unwitnessed fall at nursing home and history of dementia cannot clear by [place] head CT rule.  CT of head without contrast obtained which showed no acute traumatic injuries.    CT pulmonary embolism study obtained which was personally visualized and shows evidence of groundglass densities in the right lower lobe consistent to pneumonitis versus atypical pneumonia.  Patient given IV azithromycin for atypical coverage in addition to Rocephin given initially.    Discussed  patient's case with hospitalist who is agreeable with admission for further treatment.                           Orders placed during this visit:  Orders Placed This Encounter   Procedures    Critical Care    COVID-19 and FLU A/B PCR, 1 HR TAT - Swab, Nasopharynx    Blood Culture - Blood,    Blood Culture - Blood,    XR Chest 1 View    CT Angiogram Chest Pulmonary Embolism    CT Head Without Contrast    Comprehensive Metabolic Panel    BNP    High Sensitivity Troponin T    CBC Auto Differential    Lactic Acid, Plasma    Blood Gas, Venous -With Co-Ox Panel: Yes    Blood Gas, Venous With Co-Ox    Procalcitonin    High Sensitivity Troponin T 1Hr    NIPPV (CPAP or BIPAP)    ECG 12 Lead Dyspnea    Insert Peripheral IV    Inpatient Admission    CBC & Differential         ED Course:    Consultants: Hospitalist, Dr. Tan                Shared Decision Making:  After my consideration of clinical presentation and any laboratory/radiology studies obtained, I discussed the findings with the patient/patient representative who is in agreement with the treatment plan and the final disposition.   Risks and benefits of discharge and/or observation/admission were discussed.      AS OF 23:59 EST VITALS:    BP - 159/75  HR - 75  TEMP - (!) 100.8 °F (38.2 °C) (Axillary)  O2 SATS - 92%                  DIAGNOSIS  Final diagnoses:   Influenza A   Acute pulmonary edema   Acute respiratory failure with hypoxia and hypercapnia   Chronic dementia   Pneumonia of right lower lobe due to infectious organism         DISPOSITION  Admit      Please note that portions of this document were completed with voice recognition software.        Kj Agosto MD  02/17/25 1896

## 2025-02-18 NOTE — PROGRESS NOTES
[facility]    Name:  Nery Garcia   Age:  74 y.o.  Sex:  female  :  1950  MRN:  [id number]   Visit Number:  [id number]  Admission Date:  2025  Date Of Service:  25  Primary Care Physician:  Jackson Guillory MD     LOS: 1 day :    Chief Complaint:      Shortness of breath.    Subjective:    Nery Garcia was seen and examined this morning.  She is currently on BiPAP with 70% FiO2 saturating at 95%.  She states she is feeling better compared to yesterday.  Denies any chest pain.  She does have underlying dementia but was able to answer questions fairly appropriately.  She was unable to tell me which nursing home she is at.  Patient apparently is a long-term resident at [facility].    Hospital Course:    Nery Garcia is a 74-year-old with a history of CAD, atrial fibrillation, diastolic CHF, dementia, PE, who had presented from [facility] facility due to increasing shortness of breath, fever, cough.  Patient was apparently hypoxic at the facility and was sent to our emergency room for further workup.      In the emergency room, she was tachypneic, febrile, and hypoxic.  She had a white count of 11 hemoglobin of 11.  She proBNP of 4500.  Troponin elevation, with a delta up.  Positive influenza.  pH 7.283 with pCO2 of 60 on blood gas.  CT imaging of the chest demonstrated no pulmonary embolism, there was some bilateral groundglass opacities, worse in the right lower lobe with a small right-sided pleural effusion.  CT scan of the head was unremarkable per Radiology interpretation.  Patient received Tamiflu, DuoNeb, Rocephin.  Patient was subsequently admitted to the medical floor with telemetry for management of acute influenza A bronchitis, hypoxic respiratory failure and right lower lobe pneumonia.    Patient was continued on BiPAP therapy, oxygen, Tamiflu and ceftriaxone.    Review of Systems:     All systems were reviewed and  negative except as mentioned in subjective, assessment and plan.    Vital Signs:    Temp:  [98.6 °F (37 °C)-100.8 °F (38.2 °C)] 98.6 °F (37 °C)  Heart Rate:  [63-89] 64  Resp:  [18-29] 18  BP: (102-180)/() 108/49    Intake and output:    I/O last 3 completed shifts:  In: -   Out: 600 [Urine:600]  No intake/output data recorded.    Physical Examination:    General Appearance:  Alert and cooperative.  Obese.   Head:  Atraumatic and normocephalic.   Eyes: Conjunctivae and sclerae normal, no icterus. No pallor.   Throat: No oral lesions, no thrush, oral mucosa moist.   Neck: Supple, trachea midline, no thyromegaly.   Lungs:   Breath sounds heard bilaterally equally.  No wheezing.  Bilateral basal crackles. No Pleural rub or bronchial breathing.   Heart:  Normal S1 and S2, no murmur, no gallop, no rub. No JVD.   Abdomen:   Normal bowel sounds, no masses, no organomegaly. Soft, nontender, obese, no rebound tenderness.  Midline surgical scar noted in the lower abdomen.   Extremities: Supple, no cyanosis, no clubbing.  2+ pitting edema noted bilateral lower extremities up to the knees.  Surgical scar noted on the medial aspect of the right ankle.   Skin: No bleeding or rash.   Neurologic: Alert and oriented x 2. No facial asymmetry. Moves all four limbs but does have generalized weakness. No tremors.    Laboratory results:    Results from last 7 days   Lab Units 02/18/25  0426 02/17/25  2110   SODIUM mmol/L 138 132*   POTASSIUM mmol/L 4.6 4.8   CHLORIDE mmol/L 102 100   CO2 mmol/L 24.1 19.7*   BUN mg/dL 23 20   CREATININE mg/dL 1.43* 1.53*   CALCIUM mg/dL 8.6 9.0   BILIRUBIN mg/dL  --  0.3   ALK PHOS U/L  --  81   ALT (SGPT) U/L  --  24   AST (SGOT) U/L  --  33*   GLUCOSE mg/dL 152* 204*     Results from last 7 days   Lab Units 02/18/25  0426 02/17/25  2110   WBC 10*3/mm3 7.15 10.98*   HEMOGLOBIN g/dL 9.7* 11.1*   HEMATOCRIT % 31.0* 34.7   PLATELETS 10*3/mm3 138* 159       Results from last 7 days   Lab Units  02/17/25 2226 02/17/25  2110   HSTROP T ng/L 33* 21*     I have reviewed the patient's laboratory results.    Radiology results:    XR Chest 1 View    Result Date: 2/18/2025  PROCEDURE: XR CHEST 1 VW-  HISTORY: Cough, difficulty breathing  COMPARISON: 4/7/2023  FINDINGS:  Portable view of the chest demonstrates the lungs to be grossly clear. There is no evidence of effusion, pneumothorax or other significant pleural disease. The mediastinum is unremarkable.  The heart size is normal.      Impression: Unremarkable portable chest.    This report was signed and finalized on 2/18/2025 8:14 AM by Prakash Estevez MD.      CT Angiogram Chest Pulmonary Embolism    Result Date: 2/17/2025  FINAL REPORT TECHNIQUE: null CLINICAL HISTORY: Difficulty breathing, hypoxia and fever COMPARISON: null FINDINGS: PROCEDURE: CTA chest with IV contrast. COMPARISON: No relevant priors are available for comparison TECHNIQUE: Axial CT images of the chest were obtained after administration of intravenous contrast. Bilateral oblique MIP and coronal MPR reformations obtained. FINDINGS: Pulmonary arteries: No filling defects to suggest pulmonary embolus. Lungs: Mild ground-glass density foci in the right lower lobe. Dependent bibasilar airspace disease. Pleura: Small right pleural effusion. Aorta: No dissection or aneurysm. Heart: Mild cardiomegaly. No pericardial effusion. Lymph nodes: There is no bulky adenopathy. Bones: No suspicious osseous lesions. Small hiatal hernia. Visualized abdominal contents are unremarkable.     Impression: IMPRESSION: No evidence for pulmonary embolism. Ground-glass densities in the right lower lobe which may represent an acute pneumonitis/atypical pneumonia. There is a small right pleural effusion. Authenticated and Electronically Signed by MAXX Krueger MD on 02/17/2025 11:18:30 PM    CT Head Without Contrast    Result Date: 2/17/2025  FINAL REPORT TECHNIQUE: null CLINICAL HISTORY: Fall earlier today, dementia  COMPARISON: null FINDINGS: CT HEAD WITHOUT IV CONTRAST: COMPARISON: No relevant priors are available for comparison TECHNIQUE: Axial imaging of the head performed from the skull base to the vertex without IV contrast. Coronal reformations obtained. FINDINGS: There is diffuse volume loss. There is no mass, mass effect or midline shift. No abnormal extra-axial fluid collection or intracranial hemorrhage. There are scattered deep white matter changes. No CT evidence for acute infarction. Visualized paranasal sinuses and mastoids demonstrate minimal bilateral sphenoid sinus mucosal thickening.There are no skull fractures.     Impression: IMPRESSION: No acute intracranial abnormalities. Senescent brain changes. Authenticated and Electronically Signed by MAXX Krueger MD on 02/17/2025 10:54:17 PM   I have reviewed the patient's radiology reports.    Medication Review:     I have reviewed the patient's active and prn medications.     Problem List:      Influenza A    Assessment:    Acute hypoxic respiratory failure, POA.  Acute influenza A, POA.  Right lower lobe bacterial pneumonia, POA.  Suspected acute on chronic diastolic heart failure, POA.  Acute renal failure, POA.  Alzheimer dementia.  Paroxysmal atrial fibrillation on apixaban.  Coronary artery disease.    Plan:    Respiratory failure/influenza A/pneumonia.  - Continue oxygen and BiPAP therapy as needed.  - Continue oseltamivir.  - Continue ceftriaxone.  - Blood cultures have been negative so far.  - Nasal swab for MRSA is currently pending.    Suspected solid heart failure.  - Patient did receive IV Lasix on admission.  - Obtain 2D echocardiogram.    Paroxysmal atrial fibrillation.  - Continue sotalol and apixaban.    Discussed with nursing staff and multidisciplinary team.    I have reviewed the copied text and it is accurate as of 02/18/25    DVT Prophylaxis: Apixaban  Code Status: DNR/DNI  Diet: Cardiac  Discharge Plan: Hopefully, back to nursing home  facility in 2 to 3 days.    Maicol Gonzales MD  02/18/25  08:58 EST    Dictated utilizing Dragon dictation.

## 2025-02-19 LAB
ANION GAP SERPL CALCULATED.3IONS-SCNC: 11.4 MMOL/L (ref 5–15)
BUN SERPL-MCNC: 23 MG/DL (ref 8–23)
BUN/CREAT SERPL: 15.5 (ref 7–25)
CALCIUM SPEC-SCNC: 9 MG/DL (ref 8.6–10.5)
CHLORIDE SERPL-SCNC: 102 MMOL/L (ref 98–107)
CO2 SERPL-SCNC: 24.6 MMOL/L (ref 22–29)
CREAT SERPL-MCNC: 1.48 MG/DL (ref 0.57–1)
DEPRECATED RDW RBC AUTO: 43.4 FL (ref 37–54)
EGFRCR SERPLBLD CKD-EPI 2021: 37.2 ML/MIN/1.73
ERYTHROCYTE [DISTWIDTH] IN BLOOD BY AUTOMATED COUNT: 14 % (ref 12.3–15.4)
GEN 5 1HR TROPONIN T REFLEX: 35 NG/L
GLUCOSE BLDC GLUCOMTR-MCNC: 103 MG/DL (ref 70–130)
GLUCOSE BLDC GLUCOMTR-MCNC: 115 MG/DL (ref 70–130)
GLUCOSE BLDC GLUCOMTR-MCNC: 124 MG/DL (ref 70–130)
GLUCOSE BLDC GLUCOMTR-MCNC: 152 MG/DL (ref 70–130)
GLUCOSE SERPL-MCNC: 110 MG/DL (ref 65–99)
HCT VFR BLD AUTO: 30.1 % (ref 34–46.6)
HGB BLD-MCNC: 9.7 G/DL (ref 12–15.9)
MCH RBC QN AUTO: 27.7 PG (ref 26.6–33)
MCHC RBC AUTO-ENTMCNC: 32.2 G/DL (ref 31.5–35.7)
MCV RBC AUTO: 86 FL (ref 79–97)
PLATELET # BLD AUTO: 130 10*3/MM3 (ref 140–450)
PMV BLD AUTO: 10.2 FL (ref 6–12)
POTASSIUM SERPL-SCNC: 4 MMOL/L (ref 3.5–5.2)
PROCALCITONIN SERPL-MCNC: 0.71 NG/ML (ref 0–0.25)
RBC # BLD AUTO: 3.5 10*6/MM3 (ref 3.77–5.28)
SODIUM SERPL-SCNC: 138 MMOL/L (ref 136–145)
TROPONIN T % DELTA: 0
TROPONIN T NUMERIC DELTA: 0 NG/L
TROPONIN T SERPL HS-MCNC: 35 NG/L
WBC NRBC COR # BLD AUTO: 6.33 10*3/MM3 (ref 3.4–10.8)

## 2025-02-19 PROCEDURE — 94799 UNLISTED PULMONARY SVC/PX: CPT

## 2025-02-19 PROCEDURE — 84484 ASSAY OF TROPONIN QUANT: CPT | Performed by: INTERNAL MEDICINE

## 2025-02-19 PROCEDURE — 25010000002 CEFTRIAXONE PER 250 MG: Performed by: INTERNAL MEDICINE

## 2025-02-19 PROCEDURE — 99232 SBSQ HOSP IP/OBS MODERATE 35: CPT | Performed by: INTERNAL MEDICINE

## 2025-02-19 PROCEDURE — 82948 REAGENT STRIP/BLOOD GLUCOSE: CPT

## 2025-02-19 PROCEDURE — 84145 PROCALCITONIN (PCT): CPT | Performed by: INTERNAL MEDICINE

## 2025-02-19 PROCEDURE — 80048 BASIC METABOLIC PNL TOTAL CA: CPT | Performed by: INTERNAL MEDICINE

## 2025-02-19 PROCEDURE — 94660 CPAP INITIATION&MGMT: CPT

## 2025-02-19 PROCEDURE — 94761 N-INVAS EAR/PLS OXIMETRY MLT: CPT

## 2025-02-19 PROCEDURE — 63710000001 INSULIN LISPRO (HUMAN) PER 5 UNITS: Performed by: INTERNAL MEDICINE

## 2025-02-19 PROCEDURE — 85027 COMPLETE CBC AUTOMATED: CPT | Performed by: INTERNAL MEDICINE

## 2025-02-19 PROCEDURE — 82948 REAGENT STRIP/BLOOD GLUCOSE: CPT | Performed by: INTERNAL MEDICINE

## 2025-02-19 RX ADMIN — Medication 10 ML: at 22:00

## 2025-02-19 RX ADMIN — MEMANTINE 10 MG: 5 TABLET ORAL at 21:59

## 2025-02-19 RX ADMIN — SODIUM CHLORIDE 2000 MG: 9 INJECTION, SOLUTION INTRAVENOUS at 12:18

## 2025-02-19 RX ADMIN — SOTALOL HYDROCHLORIDE 40 MG: 80 TABLET ORAL at 08:00

## 2025-02-19 RX ADMIN — OSELTAMIVIR PHOSPHATE 30 MG: 30 CAPSULE ORAL at 21:59

## 2025-02-19 RX ADMIN — MUPIROCIN 1 APPLICATION: 20 OINTMENT TOPICAL at 21:59

## 2025-02-19 RX ADMIN — INSULIN LISPRO 2 UNITS: 100 INJECTION, SOLUTION INTRAVENOUS; SUBCUTANEOUS at 21:59

## 2025-02-19 RX ADMIN — IPRATROPIUM BROMIDE AND ALBUTEROL SULFATE 3 ML: 2.5; .5 SOLUTION RESPIRATORY (INHALATION) at 21:02

## 2025-02-19 RX ADMIN — PANTOPRAZOLE SODIUM 40 MG: 40 TABLET, DELAYED RELEASE ORAL at 05:29

## 2025-02-19 RX ADMIN — ACETAMINOPHEN 650 MG: 325 TABLET, FILM COATED ORAL at 04:05

## 2025-02-19 RX ADMIN — LORAZEPAM 1 MG: 0.5 TABLET ORAL at 08:01

## 2025-02-19 RX ADMIN — APIXABAN 5 MG: 5 TABLET, FILM COATED ORAL at 08:01

## 2025-02-19 RX ADMIN — APIXABAN 5 MG: 5 TABLET, FILM COATED ORAL at 21:59

## 2025-02-19 RX ADMIN — SOTALOL HYDROCHLORIDE 40 MG: 80 TABLET ORAL at 22:00

## 2025-02-19 RX ADMIN — MUPIROCIN 1 APPLICATION: 20 OINTMENT TOPICAL at 08:00

## 2025-02-19 RX ADMIN — IPRATROPIUM BROMIDE AND ALBUTEROL SULFATE 3 ML: 2.5; .5 SOLUTION RESPIRATORY (INHALATION) at 07:03

## 2025-02-19 RX ADMIN — OSELTAMIVIR PHOSPHATE 30 MG: 30 CAPSULE ORAL at 08:01

## 2025-02-19 RX ADMIN — IPRATROPIUM BROMIDE AND ALBUTEROL SULFATE 3 ML: 2.5; .5 SOLUTION RESPIRATORY (INHALATION) at 12:56

## 2025-02-19 RX ADMIN — MEMANTINE 10 MG: 5 TABLET ORAL at 08:01

## 2025-02-19 RX ADMIN — QUETIAPINE FUMARATE 25 MG: 25 TABLET ORAL at 22:00

## 2025-02-19 RX ADMIN — ATORVASTATIN CALCIUM 40 MG: 40 TABLET, FILM COATED ORAL at 08:01

## 2025-02-19 RX ADMIN — AMLODIPINE BESYLATE 10 MG: 5 TABLET ORAL at 08:01

## 2025-02-19 RX ADMIN — LOSARTAN POTASSIUM 100 MG: 50 TABLET, FILM COATED ORAL at 08:01

## 2025-02-19 RX ADMIN — FLUOXETINE HYDROCHLORIDE 10 MG: 10 CAPSULE ORAL at 08:01

## 2025-02-19 RX ADMIN — LORAZEPAM 1 MG: 0.5 TABLET ORAL at 18:18

## 2025-02-19 RX ADMIN — MIRTAZAPINE 15 MG: 15 TABLET ORAL at 21:59

## 2025-02-19 NOTE — PLAN OF CARE
Goal Outcome Evaluation:  Plan of Care Reviewed With: patient, child        Progress: no change  Outcome Evaluation: VS stable on 5l nc. Afebrile at this time. No significant events, continuing to monitor.

## 2025-02-19 NOTE — CASE MANAGEMENT/SOCIAL WORK
Case Management/Social Work    Patient Name:  Nery Garcia  YOB: 1950  MRN: [id number]  Admit Date:  2/17/2025        15:22 EST   Discharge Plan       Row Name 02/19/25 1520       Plan    Plan Comments DCP; Return to LTC at [facility]. Pt has a bed hold and can return when medically ready. CM continues to folow for any needs.                        Electronically signed by:  Maribel Lomeli RN  02/19/25 15:22 EST

## 2025-02-19 NOTE — PLAN OF CARE
Goal Outcome Evaluation:         RT EQUIPMENT DEVICE RELATED - SKIN ASSESSMENT    Wes Score:  Wes Score: 15     RT Medical Equipment/Device:     NIV Mask:  Full-face    size: b    Skin Assessment:      Nose:       Device Skin Pressure Protection:  Skin-to-device areas padded:  Other:       Nurse Notification:  Emy Lorenz, RRT

## 2025-02-19 NOTE — PROGRESS NOTES
[facility]    Name:  Nery Garcia   Age:  74 y.o.  Sex:  female  :  1950  MRN:  [id number]   Visit Number:  [id number]  Admission Date:  2025  Date Of Service:  25  Primary Care Physician:  Jackson Guillory MD     LOS: 2 days :    Chief Complaint:      Shortness of breath.    Subjective:    Nery Garcia was seen and examined this morning.  She is currently lying down on the bed and is comfortable at rest.  She is currently on 5 L of nasal cannula oxygen saturating at 95%.  No significant overnight events.    Hospital Course:    Nery Garcia is a 74-year-old with a history of CAD, atrial fibrillation, diastolic CHF, dementia, PE, who had presented from [facility] due to increasing shortness of breath, fever, cough.  Patient was apparently hypoxic at the facility and was sent to our emergency room for further workup.      In the emergency room, she was tachypneic, febrile, and hypoxic.  She had a white count of 11 hemoglobin of 11.  She proBNP of 4500.  Troponin elevation, with a delta up.  Positive influenza.  pH 7.283 with pCO2 of 60 on blood gas.  CT imaging of the chest demonstrated no pulmonary embolism, there was some bilateral groundglass opacities, worse in the right lower lobe with a small right-sided pleural effusion.  CT scan of the head was unremarkable per Radiology interpretation.  Patient received Tamiflu, DuoNeb, Rocephin.  Patient was subsequently admitted to the medical floor with telemetry for management of acute influenza A bronchitis, hypoxic respiratory failure and right lower lobe pneumonia.    Patient was continued on BiPAP therapy, oxygen, Tamiflu and ceftriaxone.  2D echocardiogram done on 2025 showed hyperdynamic left ventricular function with grade 1 diastolic dysfunction.    Review of Systems:     All systems were reviewed and negative except as mentioned in subjective, assessment and plan.    Vital  Signs:    Temp:  [98.6 °F (37 °C)-101.1 °F (38.4 °C)] 99.3 °F (37.4 °C)  Heart Rate:  [67-85] 72  Resp:  [22-30] 22  BP: (111-150)/(59-64) 135/64    Intake and output:    I/O last 3 completed shifts:  In: 340 [P.O.:340]  Out: 1750 [Urine:1750]  I/O this shift:  In: 220 [P.O.:220]  Out: 250 [Urine:250]    Physical Examination:    General Appearance:  Alert and cooperative.  Obese.   Head:  Atraumatic and normocephalic.   Eyes: Conjunctivae and sclerae normal, no icterus. No pallor.   Throat: No oral lesions, no thrush, oral mucosa moist.   Neck: Supple, trachea midline, no thyromegaly.   Lungs:   Breath sounds heard bilaterally equally.  No wheezing.  Bilateral basal crackles. No Pleural rub or bronchial breathing.   Heart:  Normal S1 and S2, no murmur, no gallop, no rub. No JVD.   Abdomen:   Normal bowel sounds, no masses, no organomegaly. Soft, nontender, obese, no rebound tenderness.  Midline surgical scar noted in the lower abdomen.   Extremities: Supple, no cyanosis, no clubbing.  2+ pitting edema noted bilateral lower extremities up to the knees.  Surgical scar noted on the medial aspect of the right ankle.   Skin: No bleeding or rash.   Neurologic: Alert and oriented x 2. No facial asymmetry. Moves all four limbs but does have generalized weakness. No tremors.    Laboratory results:    Results from last 7 days   Lab Units 02/19/25  0459 02/18/25  0426 02/17/25  2110   SODIUM mmol/L 138 138 132*   POTASSIUM mmol/L 4.0 4.6 4.8   CHLORIDE mmol/L 102 102 100   CO2 mmol/L 24.6 24.1 19.7*   BUN mg/dL 23 23 20   CREATININE mg/dL 1.48* 1.43* 1.53*   CALCIUM mg/dL 9.0 8.6 9.0   BILIRUBIN mg/dL  --   --  0.3   ALK PHOS U/L  --   --  81   ALT (SGPT) U/L  --   --  24   AST (SGOT) U/L  --   --  33*   GLUCOSE mg/dL 110* 152* 204*     Results from last 7 days   Lab Units 02/19/25  0459 02/18/25  0426 02/17/25  2110   WBC 10*3/mm3 6.33 7.15 10.98*   HEMOGLOBIN g/dL 9.7* 9.7* 11.1*   HEMATOCRIT % 30.1* 31.0* 34.7   PLATELETS  10*3/mm3 130* 138* 159       Results from last 7 days   Lab Units 02/19/25  0638 02/19/25  0459 02/17/25  2226   HSTROP T ng/L 35* 35* 33*     I have reviewed the patient's laboratory results.    Radiology results:    XR Chest 1 View    Result Date: 2/18/2025  PROCEDURE: XR CHEST 1 VW-  HISTORY: Cough, difficulty breathing  COMPARISON: 4/7/2023  FINDINGS:  Portable view of the chest demonstrates the lungs to be grossly clear. There is no evidence of effusion, pneumothorax or other significant pleural disease. The mediastinum is unremarkable.  The heart size is normal.      Impression: Unremarkable portable chest.    This report was signed and finalized on 2/18/2025 8:14 AM by Prakash Estevez MD.      CT Angiogram Chest Pulmonary Embolism    Result Date: 2/17/2025  FINAL REPORT TECHNIQUE: null CLINICAL HISTORY: Difficulty breathing, hypoxia and fever COMPARISON: null FINDINGS: PROCEDURE: CTA chest with IV contrast. COMPARISON: No relevant priors are available for comparison TECHNIQUE: Axial CT images of the chest were obtained after administration of intravenous contrast. Bilateral oblique MIP and coronal MPR reformations obtained. FINDINGS: Pulmonary arteries: No filling defects to suggest pulmonary embolus. Lungs: Mild ground-glass density foci in the right lower lobe. Dependent bibasilar airspace disease. Pleura: Small right pleural effusion. Aorta: No dissection or aneurysm. Heart: Mild cardiomegaly. No pericardial effusion. Lymph nodes: There is no bulky adenopathy. Bones: No suspicious osseous lesions. Small hiatal hernia. Visualized abdominal contents are unremarkable.     Impression: IMPRESSION: No evidence for pulmonary embolism. Ground-glass densities in the right lower lobe which may represent an acute pneumonitis/atypical pneumonia. There is a small right pleural effusion. Authenticated and Electronically Signed by MAXX Krueger MD on 02/17/2025 11:18:30 PM    CT Head Without Contrast    Result Date:  2/17/2025  FINAL REPORT TECHNIQUE: null CLINICAL HISTORY: Fall earlier today, dementia COMPARISON: null FINDINGS: CT HEAD WITHOUT IV CONTRAST: COMPARISON: No relevant priors are available for comparison TECHNIQUE: Axial imaging of the head performed from the skull base to the vertex without IV contrast. Coronal reformations obtained. FINDINGS: There is diffuse volume loss. There is no mass, mass effect or midline shift. No abnormal extra-axial fluid collection or intracranial hemorrhage. There are scattered deep white matter changes. No CT evidence for acute infarction. Visualized paranasal sinuses and mastoids demonstrate minimal bilateral sphenoid sinus mucosal thickening.There are no skull fractures.     Impression: IMPRESSION: No acute intracranial abnormalities. Senescent brain changes. Authenticated and Electronically Signed by MAXX Krueger MD on 02/17/2025 10:54:17 PM   I have reviewed the patient's radiology reports.    Medication Review:     I have reviewed the patient's active and prn medications.     Problem List:      Influenza A    Assessment:    Acute hypoxic respiratory failure, POA.  Acute influenza A, POA.  Right lower lobe bacterial pneumonia, POA.  Suspected acute on chronic diastolic heart failure, POA.  Acute renal failure, POA.  Alzheimer dementia.  Paroxysmal atrial fibrillation on apixaban.  Coronary artery disease.    Plan:    Respiratory failure/influenza A/pneumonia.  - Continue oxygen and BiPAP therapy as needed.  - Continue oseltamivir.  - Continue ceftriaxone.  - Repeat procalcitonin is elevated.  - Blood cultures have been negative so far.  - Nasal swab for MRSA is negative.    Suspected solid heart failure.  - Patient did receive IV Lasix on admission.  - 2D echocardiogram done on 2/18/2025 showed hyperdynamic left ventricular function with grade 1 diastolic dysfunction.    Paroxysmal atrial fibrillation.  - Continue sotalol and apixaban.    Discussed with nursing staff and  multidisciplinary team.    I discussed the patient's condition and treatment plan with her daughter Rama over the phone today.    I have reviewed the copied text and it is accurate as of 02/19/25    DVT Prophylaxis: Apixaban  Code Status: DNR/DNI  Diet: Cardiac  Discharge Plan: Hopefully, back to [facility] tomorrow.    Maicol Gonzales MD  02/19/25  15:49 EST    Dictated utilizing Dragon dictation.

## 2025-02-19 NOTE — PLAN OF CARE
Goal Outcome Evaluation:  Plan of Care Reviewed With: child        Progress: no change        Febrile X 2 overnight.  Continue ABX & tamiflu

## 2025-02-20 LAB
ACINETOBACTER SCREEN CX: NORMAL
GLUCOSE BLDC GLUCOMTR-MCNC: 104 MG/DL (ref 70–130)
GLUCOSE BLDC GLUCOMTR-MCNC: 210 MG/DL (ref 70–130)
GLUCOSE BLDC GLUCOMTR-MCNC: 398 MG/DL (ref 70–130)
VRE SPEC QL CULT: NORMAL

## 2025-02-20 PROCEDURE — 25010000002 METHYLPREDNISOLONE PER 125 MG: Performed by: INTERNAL MEDICINE

## 2025-02-20 PROCEDURE — 94799 UNLISTED PULMONARY SVC/PX: CPT

## 2025-02-20 PROCEDURE — 94660 CPAP INITIATION&MGMT: CPT

## 2025-02-20 PROCEDURE — 82948 REAGENT STRIP/BLOOD GLUCOSE: CPT | Performed by: INTERNAL MEDICINE

## 2025-02-20 PROCEDURE — 94761 N-INVAS EAR/PLS OXIMETRY MLT: CPT

## 2025-02-20 PROCEDURE — 99232 SBSQ HOSP IP/OBS MODERATE 35: CPT | Performed by: INTERNAL MEDICINE

## 2025-02-20 PROCEDURE — 25010000002 METHYLPREDNISOLONE PER 40 MG: Performed by: INTERNAL MEDICINE

## 2025-02-20 PROCEDURE — 63710000001 INSULIN LISPRO (HUMAN) PER 5 UNITS: Performed by: INTERNAL MEDICINE

## 2025-02-20 PROCEDURE — 25010000002 CEFTRIAXONE PER 250 MG: Performed by: INTERNAL MEDICINE

## 2025-02-20 PROCEDURE — 82948 REAGENT STRIP/BLOOD GLUCOSE: CPT

## 2025-02-20 RX ORDER — METHYLPREDNISOLONE SODIUM SUCCINATE 125 MG/2ML
125 INJECTION, POWDER, LYOPHILIZED, FOR SOLUTION INTRAMUSCULAR; INTRAVENOUS ONCE
Status: COMPLETED | OUTPATIENT
Start: 2025-02-20 | End: 2025-02-20

## 2025-02-20 RX ORDER — METHYLPREDNISOLONE SODIUM SUCCINATE 40 MG/ML
40 INJECTION, POWDER, LYOPHILIZED, FOR SOLUTION INTRAMUSCULAR; INTRAVENOUS EVERY 12 HOURS
Status: DISCONTINUED | OUTPATIENT
Start: 2025-02-20 | End: 2025-02-21 | Stop reason: HOSPADM

## 2025-02-20 RX ADMIN — PANTOPRAZOLE SODIUM 40 MG: 40 TABLET, DELAYED RELEASE ORAL at 06:05

## 2025-02-20 RX ADMIN — INSULIN LISPRO 6 UNITS: 100 INJECTION, SOLUTION INTRAVENOUS; SUBCUTANEOUS at 23:10

## 2025-02-20 RX ADMIN — SODIUM CHLORIDE 2000 MG: 9 INJECTION, SOLUTION INTRAVENOUS at 13:11

## 2025-02-20 RX ADMIN — ATORVASTATIN CALCIUM 40 MG: 40 TABLET, FILM COATED ORAL at 08:47

## 2025-02-20 RX ADMIN — APIXABAN 5 MG: 5 TABLET, FILM COATED ORAL at 08:47

## 2025-02-20 RX ADMIN — LOSARTAN POTASSIUM 100 MG: 50 TABLET, FILM COATED ORAL at 08:46

## 2025-02-20 RX ADMIN — SOTALOL HYDROCHLORIDE 40 MG: 80 TABLET ORAL at 23:10

## 2025-02-20 RX ADMIN — OSELTAMIVIR PHOSPHATE 30 MG: 30 CAPSULE ORAL at 23:10

## 2025-02-20 RX ADMIN — METHYLPREDNISOLONE SODIUM SUCCINATE 125 MG: 125 INJECTION, POWDER, FOR SOLUTION INTRAMUSCULAR; INTRAVENOUS at 12:50

## 2025-02-20 RX ADMIN — MEMANTINE 10 MG: 5 TABLET ORAL at 23:10

## 2025-02-20 RX ADMIN — APIXABAN 5 MG: 5 TABLET, FILM COATED ORAL at 23:10

## 2025-02-20 RX ADMIN — AMLODIPINE BESYLATE 10 MG: 5 TABLET ORAL at 08:46

## 2025-02-20 RX ADMIN — INSULIN LISPRO 3 UNITS: 100 INJECTION, SOLUTION INTRAVENOUS; SUBCUTANEOUS at 17:52

## 2025-02-20 RX ADMIN — MUPIROCIN 1 APPLICATION: 20 OINTMENT TOPICAL at 23:10

## 2025-02-20 RX ADMIN — MEMANTINE 10 MG: 5 TABLET ORAL at 08:47

## 2025-02-20 RX ADMIN — Medication 10 ML: at 23:16

## 2025-02-20 RX ADMIN — LORAZEPAM 1 MG: 0.5 TABLET ORAL at 23:10

## 2025-02-20 RX ADMIN — IPRATROPIUM BROMIDE AND ALBUTEROL SULFATE 3 ML: 2.5; .5 SOLUTION RESPIRATORY (INHALATION) at 20:14

## 2025-02-20 RX ADMIN — SOTALOL HYDROCHLORIDE 40 MG: 80 TABLET ORAL at 08:46

## 2025-02-20 RX ADMIN — MIRTAZAPINE 15 MG: 15 TABLET ORAL at 23:11

## 2025-02-20 RX ADMIN — METHYLPREDNISOLONE SODIUM SUCCINATE 40 MG: 40 INJECTION, POWDER, FOR SOLUTION INTRAMUSCULAR; INTRAVENOUS at 23:10

## 2025-02-20 RX ADMIN — Medication 10 ML: at 08:45

## 2025-02-20 RX ADMIN — FLUOXETINE HYDROCHLORIDE 10 MG: 10 CAPSULE ORAL at 08:47

## 2025-02-20 RX ADMIN — MUPIROCIN 1 APPLICATION: 20 OINTMENT TOPICAL at 08:47

## 2025-02-20 RX ADMIN — OSELTAMIVIR PHOSPHATE 30 MG: 30 CAPSULE ORAL at 08:46

## 2025-02-20 RX ADMIN — IPRATROPIUM BROMIDE AND ALBUTEROL SULFATE 3 ML: 2.5; .5 SOLUTION RESPIRATORY (INHALATION) at 13:58

## 2025-02-20 RX ADMIN — QUETIAPINE FUMARATE 25 MG: 25 TABLET ORAL at 23:11

## 2025-02-20 NOTE — PLAN OF CARE
Goal Outcome Evaluation:            Pt on CPAP throughout the shift. Will continue plan of care.

## 2025-02-20 NOTE — CASE MANAGEMENT/SOCIAL WORK
Case Management/Social Work    Patient Name:  Nery Garcia  YOB: 1950  MRN: [id number]  Admit Date:  2/17/2025        14:26 EST   Discharge Plan       Row Name 02/20/25 1425       Plan    Plan DCP Return to [facility] bed.                  1155: CM spoke with pt at bedside. Did not respond to verbal stimuli. Resting with eyes closed. 02 on at 4 liters. Sat 95%. CM to follow.      Electronically signed by:  Zenobia Do RN  02/20/25 14:26 EST

## 2025-02-20 NOTE — PROGRESS NOTES
[facility]    Name:  Nery Garcia   Age:  74 y.o.  Sex:  female  :  1950  MRN:  [id number]   Visit Number:  [id number]  Admission Date:  2025  Date Of Service:  25  Primary Care Physician:  Jackson Guillory MD     LOS: 3 days :    Chief Complaint:      Hoarseness of voice.    Subjective:    Nery Garcia was seen and examined this morning.  She is complaining of hoarseness of voice and wheezing.  Denies any chest pain.  Currently down to 4 L on nasal cannula saturating in the mid 90s.    Hospital Course:    Nery Garcia is a 74-year-old with a history of CAD, atrial fibrillation, diastolic CHF, dementia, PE, who had presented from [facility] due to increasing shortness of breath, fever, cough.  Patient was apparently hypoxic at the facility and was sent to our emergency room for further workup.      In the emergency room, she was tachypneic, febrile, and hypoxic.  She had a white count of 11 hemoglobin of 11.  She proBNP of 4500.  Troponin elevation, with a delta up.  Positive influenza.  pH 7.283 with pCO2 of 60 on blood gas.  CT imaging of the chest demonstrated no pulmonary embolism, there was some bilateral groundglass opacities, worse in the right lower lobe with a small right-sided pleural effusion.  CT scan of the head was unremarkable per Radiology interpretation.  Patient received Tamiflu, DuoNeb, Rocephin.  Patient was subsequently admitted to the medical floor with telemetry for management of acute influenza A bronchitis, hypoxic respiratory failure and right lower lobe pneumonia.    Patient was continued on BiPAP therapy, oxygen, Tamiflu and ceftriaxone.  2D echocardiogram done on 2025 showed hyperdynamic left ventricular function with grade 1 diastolic dysfunction.    Review of Systems:     All systems were reviewed and negative except as mentioned in subjective, assessment and plan.    Vital Signs:    Temp:  [97.9  °F (36.6 °C)-99.1 °F (37.3 °C)] 98.2 °F (36.8 °C)  Heart Rate:  [65-77] 70  Resp:  [18-22] 18  BP: (157-176)/(70-74) 157/73    Intake and output:    I/O last 3 completed shifts:  In: 220 [P.O.:220]  Out: 750 [Urine:750]  I/O this shift:  In: 600 [P.O.:600]  Out: 400 [Urine:400]    Physical Examination:    General Appearance:  Alert and cooperative.  Obese.   Head:  Atraumatic and normocephalic.   Eyes: Conjunctivae and sclerae normal, no icterus. No pallor.   Throat: No oral lesions, no thrush, oral mucosa moist.   Neck: Supple, trachea midline, no thyromegaly.   Lungs:   Breath sounds heard bilaterally equally.  No wheezing.  Bilateral basal crackles. No Pleural rub or bronchial breathing.   Heart:  Normal S1 and S2, no murmur, no gallop, no rub. No JVD.   Abdomen:   Normal bowel sounds, no masses, no organomegaly. Soft, nontender, obese, no rebound tenderness.  Midline surgical scar noted in the lower abdomen.   Extremities: Supple, no cyanosis, no clubbing.  2+ pitting edema noted bilateral lower extremities up to the knees.  Surgical scar noted on the medial aspect of the right ankle.   Skin: No bleeding or rash.   Neurologic: Alert and oriented x 2. No facial asymmetry. Moves all four limbs but does have generalized weakness. No tremors.    Laboratory results:    Results from last 7 days   Lab Units 02/19/25  0459 02/18/25  0426 02/17/25  2110   SODIUM mmol/L 138 138 132*   POTASSIUM mmol/L 4.0 4.6 4.8   CHLORIDE mmol/L 102 102 100   CO2 mmol/L 24.6 24.1 19.7*   BUN mg/dL 23 23 20   CREATININE mg/dL 1.48* 1.43* 1.53*   CALCIUM mg/dL 9.0 8.6 9.0   BILIRUBIN mg/dL  --   --  0.3   ALK PHOS U/L  --   --  81   ALT (SGPT) U/L  --   --  24   AST (SGOT) U/L  --   --  33*   GLUCOSE mg/dL 110* 152* 204*     Results from last 7 days   Lab Units 02/19/25  0459 02/18/25  0426 02/17/25  2110   WBC 10*3/mm3 6.33 7.15 10.98*   HEMOGLOBIN g/dL 9.7* 9.7* 11.1*   HEMATOCRIT % 30.1* 31.0* 34.7   PLATELETS 10*3/mm3 130* 138* 159        Results from last 7 days   Lab Units 02/19/25  0638 02/19/25  0459 02/17/25  2226   HSTROP T ng/L 35* 35* 33*     I have reviewed the patient's laboratory results.    Radiology results:    No radiology results from the last 24 hrs  I have reviewed the patient's radiology reports.    Medication Review:     I have reviewed the patient's active and prn medications.     Problem List:      Influenza A    Assessment:    Acute hypoxic respiratory failure, POA.  Acute influenza A, POA.  Right lower lobe bacterial pneumonia, POA.  Suspected acute on chronic diastolic heart failure, POA.  Acute renal failure, POA.  Alzheimer dementia.  Paroxysmal atrial fibrillation on apixaban.  Coronary artery disease.    Plan:    Respiratory failure/influenza A/pneumonia.  - Continue oxygen and BiPAP therapy as needed.  - Continue oseltamivir.  - Continue ceftriaxone.  - Repeat procalcitonin is elevated.  - Blood cultures have been negative so far.  - Nasal swab for MRSA is negative.  - Patient does seem to have laryngitis and significant wheezing and we will start her on Solu-Medrol.  - Hold off on discharge for 1 more day.    Suspected solid heart failure.  - Patient did receive IV Lasix on admission.  - 2D echocardiogram done on 2/18/2025 showed hyperdynamic left ventricular function with grade 1 diastolic dysfunction.    Paroxysmal atrial fibrillation.  - Continue sotalol and apixaban.    Discussed with nursing staff.    I have reviewed the copied text and it is accurate as of 02/20/25    DVT Prophylaxis: Apixaban  Code Status: DNR/DNI  Diet: Cardiac  Discharge Plan: Hopefully, back to [facility] tomorrow.    Maicol Gonzales MD  02/20/25  15:42 EST    Dictated utilizing Dragon dictation.

## 2025-02-20 NOTE — PLAN OF CARE
Goal Outcome Evaluation:  Plan of Care Reviewed With: patient        Progress: improving  Outcome Evaluation: VSS. Pt on 4 liters nasal cannula alternating with the Cpap occassionally as well. IS being demonstrated and encouraged.

## 2025-02-21 VITALS
BODY MASS INDEX: 39.19 KG/M2 | WEIGHT: 235.23 LBS | OXYGEN SATURATION: 95 % | DIASTOLIC BLOOD PRESSURE: 75 MMHG | SYSTOLIC BLOOD PRESSURE: 164 MMHG | TEMPERATURE: 97.5 F | RESPIRATION RATE: 18 BRPM | HEART RATE: 73 BPM | HEIGHT: 65 IN

## 2025-02-21 LAB
C AURIS DNA SPEC QL NAA+NON-PROBE: NOT DETECTED
GLUCOSE BLDC GLUCOMTR-MCNC: 203 MG/DL (ref 70–130)

## 2025-02-21 PROCEDURE — 99238 HOSP IP/OBS DSCHRG MGMT 30/<: CPT | Performed by: INTERNAL MEDICINE

## 2025-02-21 PROCEDURE — 82948 REAGENT STRIP/BLOOD GLUCOSE: CPT | Performed by: INTERNAL MEDICINE

## 2025-02-21 PROCEDURE — 25010000002 METHYLPREDNISOLONE PER 40 MG: Performed by: INTERNAL MEDICINE

## 2025-02-21 PROCEDURE — 63710000001 INSULIN LISPRO (HUMAN) PER 5 UNITS: Performed by: INTERNAL MEDICINE

## 2025-02-21 PROCEDURE — 94799 UNLISTED PULMONARY SVC/PX: CPT

## 2025-02-21 PROCEDURE — 94660 CPAP INITIATION&MGMT: CPT

## 2025-02-21 RX ORDER — MIRTAZAPINE 15 MG/1
30 TABLET, FILM COATED ORAL NIGHTLY
Start: 2025-02-21

## 2025-02-21 RX ORDER — LORAZEPAM 1 MG/1
1 TABLET ORAL 2 TIMES DAILY
Qty: 12 TABLET | Refills: 0 | Status: SHIPPED | OUTPATIENT
Start: 2025-02-21

## 2025-02-21 RX ORDER — CEPHALEXIN 500 MG/1
500 CAPSULE ORAL 3 TIMES DAILY
Start: 2025-02-21 | End: 2025-02-23

## 2025-02-21 RX ORDER — PANTOPRAZOLE SODIUM 20 MG/1
40 TABLET, DELAYED RELEASE ORAL DAILY
Start: 2025-02-21

## 2025-02-21 RX ORDER — OSELTAMIVIR PHOSPHATE 30 MG/1
30 CAPSULE ORAL EVERY 12 HOURS SCHEDULED
Start: 2025-02-21 | End: 2025-02-23

## 2025-02-21 RX ORDER — PREDNISONE 20 MG/1
20 TABLET ORAL DAILY
Start: 2025-02-21 | End: 2025-02-24

## 2025-02-21 RX ORDER — FUROSEMIDE 20 MG/1
40 TABLET ORAL DAILY
Start: 2025-02-21

## 2025-02-21 RX ADMIN — ATORVASTATIN CALCIUM 40 MG: 40 TABLET, FILM COATED ORAL at 08:14

## 2025-02-21 RX ADMIN — APIXABAN 5 MG: 5 TABLET, FILM COATED ORAL at 08:14

## 2025-02-21 RX ADMIN — IPRATROPIUM BROMIDE AND ALBUTEROL SULFATE 3 ML: 2.5; .5 SOLUTION RESPIRATORY (INHALATION) at 07:16

## 2025-02-21 RX ADMIN — INSULIN LISPRO 3 UNITS: 100 INJECTION, SOLUTION INTRAVENOUS; SUBCUTANEOUS at 08:15

## 2025-02-21 RX ADMIN — AMLODIPINE BESYLATE 10 MG: 5 TABLET ORAL at 08:15

## 2025-02-21 RX ADMIN — MUPIROCIN 1 APPLICATION: 20 OINTMENT TOPICAL at 08:14

## 2025-02-21 RX ADMIN — FLUOXETINE HYDROCHLORIDE 10 MG: 10 CAPSULE ORAL at 08:15

## 2025-02-21 RX ADMIN — Medication 10 ML: at 08:15

## 2025-02-21 RX ADMIN — METHYLPREDNISOLONE SODIUM SUCCINATE 40 MG: 40 INJECTION, POWDER, FOR SOLUTION INTRAMUSCULAR; INTRAVENOUS at 08:14

## 2025-02-21 RX ADMIN — OSELTAMIVIR PHOSPHATE 30 MG: 30 CAPSULE ORAL at 08:14

## 2025-02-21 RX ADMIN — LOSARTAN POTASSIUM 100 MG: 50 TABLET, FILM COATED ORAL at 08:15

## 2025-02-21 RX ADMIN — SOTALOL HYDROCHLORIDE 40 MG: 80 TABLET ORAL at 08:14

## 2025-02-21 RX ADMIN — MEMANTINE 10 MG: 5 TABLET ORAL at 08:15

## 2025-02-21 NOTE — CASE MANAGEMENT/SOCIAL WORK
Case Management/Social Work    Patient Name:  Nery Garcia  YOB: 1950  MRN: [id number]  Admit Date:  2/17/2025        Pt returning to [facility] today for LTC.  updated facility.       Electronically signed by:  CADEN Adam  02/21/25 09:07 EST

## 2025-02-21 NOTE — NURSING NOTE
Report [facility] to [facility] to [facility],discharge AVS reviewed  Called daughter Rama and notified of DC back to [facility]

## 2025-02-21 NOTE — DISCHARGE SUMMARY
[facility]   DISCHARGE SUMMARY      Name:  Nery Garcia   Age:  74 y.o.  Sex:  female  :  1950  MRN:  [id number]   Visit Number:  [id number]    Admission Date:  2025  Date of Discharge:  2025  Primary Care Physician:  Jackson Guillory MD    Important issues to note:    1.  Patient was admitted with shortness of breath, chest congestion and was noted to have influenza A bronchitis and pneumonia.  She was treated with Tamiflu as well as ceftriaxone.  She was also noted to have laryngitis and was given IV steroids.  She is currently feeling better and will be discharged back to the nursing home facility to continue Keflex for 2 more days and prednisone 20 mg daily for 3 more days.  She is currently on 4 L of oxygen which will be continued.  No change has been made to her home medication regimen.    Discharge Diagnoses:     Acute on chronic hypoxic respiratory failure, POA, improving.  Acute influenza A, POA, improving.  Right lower lobe bacterial pneumonia, POA, improving.  Chronic diastolic heart failure, no exacerbation.  Chronic kidney disease stage III.  Demand ischemia due to influenza A and pneumonia, POA.  No evidence of acute renal failure.  Alzheimer dementia.  Paroxysmal atrial fibrillation on apixaban.  Coronary artery disease.    Problem List:     Active Hospital Problems    Diagnosis  POA    **Influenza A [J10.1]  Yes      Resolved Hospital Problems   No resolved problems to display.     Presenting Problem:    Chief Complaint   Patient presents with    Cough      Consults:     Consulting Physician(s)                     None              Procedures Performed:    None.    History of presenting illness/Hospital Course:    Nery Garcia is a 74-year-old with a history of CAD, atrial fibrillation, diastolic CHF, dementia, PE, who had presented from [facility] due to increasing shortness of breath, fever, cough.  Patient was apparently hypoxic at  the facility and was sent to our emergency room for further workup.      In the emergency room, she was tachypneic, febrile, and hypoxic.  She had a white count of 11 hemoglobin of 11.  She proBNP of 4500.  Troponin elevation, with a delta up.  Positive influenza.  pH 7.283 with pCO2 of 60 on blood gas.  CT imaging of the chest demonstrated no pulmonary embolism, there was some bilateral groundglass opacities, worse in the right lower lobe with a small right-sided pleural effusion.  CT scan of the head was unremarkable per Radiology interpretation.  Patient received Tamiflu, DuoNeb, Rocephin.  Patient was subsequently admitted to the medical floor with telemetry for management of acute influenza A bronchitis, hypoxic respiratory failure and right lower lobe pneumonia.     Patient was continued on BiPAP therapy, oxygen, Tamiflu and ceftriaxone.  2D echocardiogram done on 2/18/2025 showed hyperdynamic left ventricular function with grade 1 diastolic dysfunction.  Patient did have hoarseness of voice secondary to laryngitis and was treated with steroids with improvement in her symptoms.  She is currently doing better and will be discharged back to the skilled nursing facility to continue Keflex for 2 more days and prednisone for 3 more days.    Respiratory failure/influenza A/pneumonia.  - Continue oxygen.  Patient also received BiPAP during the hospital stay.  - Continue to finish the course of Tamiflu for 1 more day.  - Patient received ceftriaxone during the hospital stay and will be continued on Keflex for 2 more days.  - Procalcitonin was mildly elevated.  - Blood cultures have been negative so far.  - Nasal swab for MRSA is negative.  - Continue prednisone 20 mg daily for 3 more days for laryngitis.     Chronic diastolic heart failure.  - Did receive IV Lasix on admission but I do not suspect heart failure exacerbation during this admission.  - 2D echocardiogram done on 2/18/2025 showed hyperdynamic left  ventricular function with grade 1 diastolic dysfunction.     Paroxysmal atrial fibrillation.  - Continue sotalol and apixaban.    Vital Signs:    Temp:  [97.5 °F (36.4 °C)-98.2 °F (36.8 °C)] 97.5 °F (36.4 °C)  Heart Rate:  [70-74] 73  Resp:  [18] 18  BP: (133-164)/(55-75) 164/75    Physical Exam:    General Appearance:  Alert and cooperative.    Head:  Atraumatic and normocephalic.   Eyes: Conjunctivae and sclerae normal, no icterus. No pallor.   Ears:  Ears with no abnormalities noted.   Throat: No oral lesions, no thrush, oral mucosa moist.   Neck: Supple, trachea midline, no thyromegaly.   Back:   No kyphoscoliosis present. No tenderness to palpation.   Lungs:   Breath sounds heard bilaterally equally.  No crackles or wheezing. No Pleural rub or bronchial breathing.   Heart:  Normal S1 and S2, no murmur, no gallop, no rub. No JVD.   Abdomen:   Normal bowel sounds, no masses, no organomegaly. Soft, nontender, BS, no rebound tenderness.  Midline surgical scar noted in the lower abdomen.   Extremities: Supple, no cyanosis, no clubbing. 2+ pitting edema noted bilateral lower extremities up to the knees. Surgical scar noted on the medial aspect of the right ankle.    Pulses: Pulses palpable bilaterally.   Skin: No bleeding or rash.   Neurologic: Alert and oriented x 2.  Pleasantly confused at times due to dementia.  No facial asymmetry. Moves all four limbs but does have generalized weakness. No tremors.     Pertinent Lab Results:     Results from last 7 days   Lab Units 02/19/25  0459 02/18/25  0426 02/17/25  2110   SODIUM mmol/L 138 138 132*   POTASSIUM mmol/L 4.0 4.6 4.8   CHLORIDE mmol/L 102 102 100   CO2 mmol/L 24.6 24.1 19.7*   BUN mg/dL 23 23 20   CREATININE mg/dL 1.48* 1.43* 1.53*   CALCIUM mg/dL 9.0 8.6 9.0   BILIRUBIN mg/dL  --   --  0.3   ALK PHOS U/L  --   --  81   ALT (SGPT) U/L  --   --  24   AST (SGOT) U/L  --   --  33*   GLUCOSE mg/dL 110* 152* 204*     Results from last 7 days   Lab Units  02/19/25  0459 02/18/25  0426 02/17/25  2110   WBC 10*3/mm3 6.33 7.15 10.98*   HEMOGLOBIN g/dL 9.7* 9.7* 11.1*   HEMATOCRIT % 30.1* 31.0* 34.7   PLATELETS 10*3/mm3 130* 138* 159       Results from last 7 days   Lab Units 02/19/25  0638 02/19/25  0459 02/17/25  2226   HSTROP T ng/L 35* 35* 33*     Results from last 7 days   Lab Units 02/17/25  2110   PROBNP pg/mL 4,446.0*       Results from last 7 days   Lab Units 02/17/25  2145 02/17/25  2130   BLOODCX  No growth at 3 days No growth at 3 days     Pertinent Radiology Results:    Imaging Results (All)       Procedure Component Value Units Date/Time    XR Chest 1 View [[id number]] Collected: 02/18/25 0814     Updated: 02/18/25 0817    Narrative:      PROCEDURE: XR CHEST 1 VW-     HISTORY: Cough, difficulty breathing     COMPARISON: 4/7/2023     FINDINGS:  Portable view of the chest demonstrates the lungs to be  grossly clear. There is no evidence of effusion, pneumothorax or other  significant pleural disease. The mediastinum is unremarkable.     The heart size is normal.       Impression:      Unremarkable portable chest.      This report was signed and finalized on 2/18/2025 8:14 AM by Prakash Estevez MD.       CT Angiogram Chest Pulmonary Embolism [[id number]] Collected: 02/17/25 2318     Updated: 02/17/25 2319    Narrative:      FINAL REPORT    TECHNIQUE:  null    CLINICAL HISTORY:  Difficulty breathing, hypoxia and fever    COMPARISON:  null    FINDINGS:  PROCEDURE: CTA chest with IV contrast.    COMPARISON:    No relevant priors are available for comparison    TECHNIQUE: Axial CT images of the chest were obtained after administration of intravenous contrast. Bilateral oblique MIP and coronal MPR reformations obtained.    FINDINGS:    Pulmonary arteries: No filling defects to suggest pulmonary embolus.    Lungs: Mild ground-glass density foci in the right lower lobe. Dependent bibasilar airspace disease.    Pleura: Small right pleural effusion.    Aorta: No  dissection or aneurysm.    Heart: Mild cardiomegaly. No pericardial effusion.    Lymph nodes: There is no bulky adenopathy.    Bones: No suspicious osseous lesions.    Small hiatal hernia.    Visualized abdominal contents are unremarkable.      Impression:      IMPRESSION:    No evidence for pulmonary embolism.    Ground-glass densities in the right lower lobe which may represent an acute pneumonitis/atypical pneumonia. There is a small right pleural effusion.    Authenticated and Electronically Signed by MAXX Krueger MD on  02/17/2025 11:18:30 PM    CT Head Without Contrast [[id number]] Collected: 02/17/25 2254     Updated: 02/17/25 2254    Narrative:      FINAL REPORT    TECHNIQUE:  null    CLINICAL HISTORY:  Fall earlier today, dementia    COMPARISON:  null    FINDINGS:  CT HEAD WITHOUT IV CONTRAST:    COMPARISON:    No relevant priors are available for comparison    TECHNIQUE: Axial imaging of the head performed from the skull base to the vertex without IV contrast. Coronal reformations obtained.    FINDINGS:    There is diffuse volume loss. There is no mass, mass effect or midline shift. No abnormal extra-axial fluid collection or intracranial hemorrhage.    There are scattered deep white matter changes. No CT evidence for acute infarction. Visualized paranasal sinuses and mastoids demonstrate minimal bilateral sphenoid sinus mucosal thickening.There are no skull fractures.      Impression:      IMPRESSION:    No acute intracranial abnormalities.    Senescent brain changes.    Authenticated and Electronically Signed by MAXX Krueger MD on  02/17/2025 10:54:17 PM     Echo:    Results for orders placed during the hospital encounter of 02/17/25    Adult Transthoracic Echo Complete W/ Cont if Necessary Per Protocol    Interpretation Summary  1.  Normal left ventricular size and systolic function, LVEF 65-70%.  2.  Grade 1 diastolic dysfunction.  3.  Mildly increased left atrial volume index.  4.  Normal right  ventricular size and systolic function.  5.  Trace MR and TR.    Condition on Discharge:      Stable.    Code status during the hospital stay:    Code Status and Medical Interventions: No CPR (Do Not Attempt to Resuscitate); Limited Support; No intubation (DNI)   Ordered at: 02/18/25 0227     Medical Intervention Limits:    No intubation (DNI)     Code Status (Patient has no pulse and is not breathing):    No CPR (Do Not Attempt to Resuscitate)     Medical Interventions (Patient has pulse or is breathing):    Limited Support     Discharge Disposition:    Skilled Nursing Facility (DC - External)    Discharge Medications:       Discharge Medications        New Medications        Instructions Start Date   cephalexin 500 MG capsule  Commonly known as: Keflex   500 mg, Oral, 3 Times Daily      oseltamivir 30 MG capsule  Commonly known as: TAMIFLU   30 mg, Oral, Every 12 Hours Scheduled             Changes to Medications        Instructions Start Date   sotalol 80 MG tablet  Commonly known as: BETAPACE  What changed:   how much to take  when to take this   TAKE 1/2 TABLET TWICE DAILY      Vitamin D 50 MCG (2000 UT) tablet  What changed: Another medication with the same name was removed. Continue taking this medication, and follow the directions you see here.   2,000 Units, Oral, Daily             Continue These Medications        Instructions Start Date   acetaminophen 325 MG tablet  Commonly known as: TYLENOL   650 mg, Every 6 Hours PRN      amLODIPine 10 MG tablet  Commonly known as: NORVASC   10 mg, Oral, Daily      atorvastatin 40 MG tablet  Commonly known as: LIPITOR   40 mg, Oral, Daily      atorvastatin 40 MG tablet  Commonly known as: LIPITOR   40 mg, Daily      dextromethorphan-guaifenesin  MG/5ML syrup  Commonly known as: ROBITUSSIN-DM   10 mL, 3 Times Daily      donepezil 10 MG tablet  Commonly known as: Aricept   10 mg, Oral, Nightly      Dulcolax 10 MG suppository  Generic drug: bisacodyl   10 mg, 2  Times Daily PRN      DULoxetine 60 MG capsule  Commonly known as: CYMBALTA   60 mg, Daily      Eliquis 5 MG tablet tablet  Generic drug: apixaban   TAKE 1 TABLET EVERY 12 HOURS      FLUoxetine 10 MG tablet  Commonly known as: PROzac   10 mg      furosemide 20 MG tablet  Commonly known as: LASIX   40 mg, Oral, Daily      Hydrocortisone (Perianal) 1 % cream rectal cream  Commonly known as: PROCTOCORT   1 Application, 2 Times Daily PRN      hydrOXYzine 50 MG tablet  Commonly known as: ATARAX   50 mg, Every 8 Hours PRN      LORazepam 1 MG tablet  Commonly known as: ATIVAN   1 mg, Oral, 2 Times Daily      losartan 100 MG tablet  Commonly known as: COZAAR   100 mg, Oral, Daily      memantine 10 MG tablet  Commonly known as: NAMENDA   10 mg, 2 Times Daily      metFORMIN 500 MG tablet  Commonly known as: GLUCOPHAGE   500 mg      MiraLax 17 GM/SCOOP powder  Generic drug: polyethylene glycol   17 g, Daily      mirtazapine 15 MG tablet  Commonly known as: REMERON   30 mg, Oral, Nightly      Multi-Day Vitamins tablet tablet  Generic drug: multivitamin   1 tablet, Daily      multivitamin with minerals tablet tablet   1 tablet, Daily      O2  Commonly known as: OXYGEN   1 L/min, Nightly      pantoprazole 20 MG EC tablet  Commonly known as: PROTONIX   40 mg, Oral, Daily      PHOSPHATE ENEMA RE   1 Application, Daily PRN      potassium chloride 20 MEQ CR tablet  Commonly known as: KLOR-CON M20   20 mEq, Daily      QUEtiapine 25 MG tablet  Commonly known as: SEROquel   25 mg, Nightly      senna 8.6 MG tablet  Commonly known as: SENOKOT   1 tablet, Daily PRN      Trelegy Ellipta 100-62.5-25 MCG/ACT inhaler  Generic drug: Fluticasone-Umeclidin-Vilant   1 puff, Daily - RT      vitamin B-12 1000 MCG tablet  Commonly known as: CYANOCOBALAMIN   1,000 mcg, Daily      ZYRTEC PO   10 mg, Daily             Stop These Medications      guaifenesin 100 MG/5ML liquid  Commonly known as: ROBITUSSIN     nitrofurantoin (macrocrystal-monohydrate) 100  MG capsule  Commonly known as: MACROBID            Discharge Diet:     Diet Instructions       Diet: Cardiac Diets; Healthy Heart (2-3 Na+); Regular (IDDSI 7); Thin (IDDSI 0)      Discharge Diet: Cardiac Diets    Cardiac Diet: Healthy Heart (2-3 Na+)    Texture: Regular (IDDSI 7)    Fluid Consistency: Thin (IDDSI 0)          Activity at Discharge:     Activity Instructions       Activity as Tolerated            Follow-up Appointments:     Contact information for follow-up providers       Jackson Guillory MD .    Specialty: Internal Medicine  Contact information:  [address] [address] [phone]                       Contact information for after-discharge care       Destination       [facility] .    Service: Nursing Home  Contact information:  [address] [place] [id number] [phone]                                 Test Results Pending at Discharge:    Pending Results       None             Maicol Gonzales MD  02/21/25  08:58 EST    Time: I spent 25 minutes on this discharge activity which included: face-to-face encounter with the patient, reviewing the data in the system, coordination of the care with the nursing staff as well as consultants, documentation, and entering orders.     Dictated utilizing Dragon dictation.

## 2025-02-21 NOTE — PLAN OF CARE
Goal Outcome Evaluation:               VSS att. Pt on O2 @4L [place]. Pt confused and agitated this shift. Will continue plan of care.

## 2025-02-21 NOTE — CASE MANAGEMENT/SOCIAL WORK
Case Management Discharge Note      Final Note: DC via [facility] to [facility] for LTC bed/placement.          Selected Continued Care - Discharged on 2/21/2025 Admission date: 2/17/2025 - Discharge disposition: Skilled Nursing Facility (DC - External)      Destination Coordination complete.      Service Provider Services Address Phone Fax Patient Preferred    [facility] [address] [address] [phone] --              Durable Medical Equipment    No services have been selected for the patient.                Dialysis/Infusion    No services have been selected for the patient.                Home Medical Care    No services have been selected for the patient.                Therapy    No services have been selected for the patient.                Community Resources    No services have been selected for the patient.                Community & DME    No services have been selected for the patient.                    Transportation Services  Ambulance: [facility]    Final Discharge Disposition Code: 04 - intermediate care facility

## 2025-02-22 LAB
BACTERIA SPEC AEROBE CULT: NORMAL
BACTERIA SPEC AEROBE CULT: NORMAL

## 2025-03-18 ENCOUNTER — NURSING HOME (OUTPATIENT)
Dept: INTERNAL MEDICINE | Facility: CLINIC | Age: 75
End: 2025-03-18
Payer: MEDICARE

## 2025-03-18 VITALS
TEMPERATURE: 98 F | DIASTOLIC BLOOD PRESSURE: 67 MMHG | SYSTOLIC BLOOD PRESSURE: 166 MMHG | HEIGHT: 68 IN | BODY MASS INDEX: 35.61 KG/M2 | HEART RATE: 70 BPM | RESPIRATION RATE: 14 BRPM | WEIGHT: 235 LBS | OXYGEN SATURATION: 91 %

## 2025-03-18 DIAGNOSIS — J43.8 OTHER EMPHYSEMA: ICD-10-CM

## 2025-03-18 DIAGNOSIS — H61.21 IMPACTED CERUMEN, RIGHT EAR: ICD-10-CM

## 2025-03-18 DIAGNOSIS — J18.9 PNEUMONIA DUE TO INFECTIOUS ORGANISM, UNSPECIFIED LATERALITY, UNSPECIFIED PART OF LUNG: ICD-10-CM

## 2025-03-18 DIAGNOSIS — R51.9 CHRONIC DAILY HEADACHE: ICD-10-CM

## 2025-03-18 DIAGNOSIS — J10.1 INFLUENZA A: ICD-10-CM

## 2025-03-18 DIAGNOSIS — B37.89 CANDIDIASIS OF BREAST: ICD-10-CM

## 2025-03-18 DIAGNOSIS — G47.33 OBSTRUCTIVE SLEEP APNEA SYNDROME: ICD-10-CM

## 2025-03-18 DIAGNOSIS — I50.33 ACUTE ON CHRONIC DIASTOLIC CHF (CONGESTIVE HEART FAILURE): ICD-10-CM

## 2025-03-18 DIAGNOSIS — I10 PRIMARY HYPERTENSION: ICD-10-CM

## 2025-03-18 DIAGNOSIS — F02.B18 MODERATE LATE ONSET ALZHEIMER'S DEMENTIA WITH OTHER BEHAVIORAL DISTURBANCE: Primary | ICD-10-CM

## 2025-03-18 DIAGNOSIS — I48.0 PAROXYSMAL ATRIAL FIBRILLATION: ICD-10-CM

## 2025-03-18 DIAGNOSIS — G30.1 MODERATE LATE ONSET ALZHEIMER'S DEMENTIA WITH OTHER BEHAVIORAL DISTURBANCE: Primary | ICD-10-CM

## 2025-03-18 PROCEDURE — 99306 1ST NF CARE HIGH MDM 50: CPT | Performed by: INTERNAL MEDICINE

## 2025-03-18 NOTE — LETTER
Nursing Home Progress Note        Que Johnson DO   [address] [place] [address] Phone: [phone]  Fax: [phone]     PATIENT NAME: Nery Garcia                                                                          YOB: 1950           DATE OF SERVICE: 03/18/2025  FACILITY:  [facility]   ______________________________________________________________________     CHIEF COMPLAINT:  Chronic Medical Management    History of Present Illness         PAST MEDICAL & SURGICAL HISTORY:   Past Medical History:   Diagnosis Date    Acute bronchitis     Acute otitis media     Anxiety     Atrial fibrillation     Body piercing     EARS    CAD (coronary artery disease)     Cardiac insufficiency     Cataract     Has implants    Chest pain     Colitis     COPD (chronic obstructive pulmonary disease)     Dementia     PATIENT REPORTS VERY EARLY STAGES OF THIS.  REPORTS CURRENTLY LIVES BY HERSELF.    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 BUT DOES NOT WEAR THEM ANYMORE    Hepatitis     REPORTS AS A CHILD, UNSURE TYPE    History of esophageal reflux     History of medical problems     Dementia    History of obesity     Hyperlipidemia     Hypertension     Iron deficiency anemia     Moderate Alzheimer's dementia without behavioral disturbance, psychotic disturbance, mood disturbance, or anxiety     Obesity      TRUNKAL    On home oxygen therapy     REPORTS SHE WEARS AT 1L [place] HS    Otitis externa     PAF (paroxysmal atrial fibrillation)     Peptic ulcer disease     noted on EGD, November 2013    Pneumonia     Pulmonary embolus 01/01/2012    RLS (restless legs syndrome)     Vitamin B 12 deficiency     Vitamin D deficiency     Wears glasses     Wears partial dentures     FULL UPPER PLATE AND PARTIAL LOWER  PLATE      Past Surgical History:   Procedure Laterality Date    BREAST EXCISIONAL BIOPSY Bilateral     Patient unsure of dates-     BREAST SURGERY Bilateral     lumpectomies, PATIENT REPORTS BENIGN AND THAT THERE ARE NO RESTRICTIONS ON BUE'S    CATARACT EXTRACTION Bilateral     COLONOSCOPY N/A 2017    Procedure: COLONOSCOPY WITH COLD BIOPSY POLYPECTOMY; BIOPSIES;  Surgeon: Severiano Calloway MD;  Location: [facility] ENDOSCOPY;  Service:     CORONARY ANGIOPLASTY  10/01/2012    Nonobstructive coronary artery disease with normal left ventricular function     HYSTERECTOMY      OOPHORECTOMY      TOTAL ABDOMINAL HYSTERECTOMY WITH SALPINGO OOPHORECTOMY           MEDICATIONS:  I have reviewed and reconciled the patients medication list in the patients chart at the skilled nursing facility today, 2025.      ALLERGIES:  Allergies   Allergen Reactions    Lisinopril Cough    Other Rash     Contact Metal Agent         SOCIAL HISTORY:  Social History     Socioeconomic History    Marital status:    Tobacco Use    Smoking status: Former     Current packs/day: 0.00     Average packs/day: 3.0 packs/day for 35.0 years (105.0 ttl pk-yrs)     Types: Cigarettes     Start date: 1970     Quit date: 2005     Years since quittin.2     Passive exposure: Past    Smokeless tobacco: Never   Vaping Use    Vaping status: Never Used   Substance and Sexual Activity    Alcohol use: Not Currently    Drug use: No    Sexual activity: Not Currently     Partners: Male     Birth control/protection: Abstinence, Hysterectomy       FAMILY HISTORY:  Family History   Problem Relation Age of Onset    Heart attack Mother     Stroke Mother     Arthritis Mother         Crippling arthritis    Hyperlipidemia Mother     Heart attack Father     Stroke Father     Alcohol abuse Father     Hyperlipidemia Father     Heart attack Brother     Hearing loss Brother     Liver disease Sister     Breast cancer Neg Hx     Ovarian cancer Neg Hx     Colon  "cancer Neg Hx        REVIEW OF SYSTEMS:  Review of Systems   Unable to perform ROS: Dementia      PHYSICAL EXAMINATION:     VITAL SIGNS:  /67   Pulse 70   Temp 98 °F (36.7 °C)   Resp 14   Ht 172.7 cm (68\")   Wt 107 kg (235 lb)   SpO2 91%   BMI 35.73 kg/m²     Physical Exam  Vitals and nursing note reviewed.   Constitutional:       Appearance: Normal appearance. She is well-developed. She is obese.   HENT:      Head: Normocephalic and atraumatic.      Nose: Nose normal.      Mouth/Throat:      Mouth: Mucous membranes are moist.      Pharynx: No oropharyngeal exudate.   Eyes:      General: No scleral icterus.     Conjunctiva/sclera: Conjunctivae normal.      Pupils: Pupils are equal, round, and reactive to light.   Neck:      Thyroid: No thyromegaly.   Cardiovascular:      Rate and Rhythm: Normal rate and regular rhythm.      Heart sounds: Normal heart sounds. No murmur heard.     No friction rub. No gallop.   Pulmonary:      Effort: Pulmonary effort is normal. No respiratory distress.      Breath sounds: Normal breath sounds. Wheezing (mild) present.   Abdominal:      General: Bowel sounds are normal. There is no distension.      Palpations: Abdomen is soft.      Tenderness: There is no abdominal tenderness.   Musculoskeletal:         General: No deformity or signs of injury.      Cervical back: Normal range of motion and neck supple.   Lymphadenopathy:      Cervical: No cervical adenopathy.   Skin:     General: Skin is warm and dry.      Findings: No rash.   Neurological:      Mental Status: She is alert and oriented to person, place, and time.   Psychiatric:      Comments: sleepy     RECORDS REVIEW:   Results      ASSESSMENT     There are no diagnoses linked to this encounter.    Assessment & Plan      HPI      [x]  Discussed Patient in detail with nursing/staff, addressed all needs today.     [x]  Plan of Care Reviewed   []  PT/OT Reviewed   []  Order Changes  []  Discharge Plans Reviewed  [x]  " Advance Directive on file with Nursing Home.   [x]  POA on file with Nursing Home.    [x]  Code Status listed and reviewed.     I confirm accuracy of unchanged data/findings including physical exam and plan which have been carried forward from previous visit, as well as I have updated appropriately those that have changed        Kathryn Cedeño MA.  3/18/2025      Patient or patient representative verbalized consent for the use of Ambient Listening during the visit with  Que Johnson DO for chart documentation. 3/18/2025  14:19 EDT

## 2025-03-18 NOTE — PROGRESS NOTES
Nursing Home Progress Note        Que Johnson    [address] [place] [address] Phone: [phone]  Fax: [phone]     PATIENT NAME: Nery Garcia                                                                          YOB: 1950           DATE OF SERVICE: 03/18/2025  FACILITY:  [facility]   ______________________________________________________________________     CHIEF COMPLAINT:  Readmission to nursing facility    History of Present Illness  The patient is a 74-year-old female being seen in the nursing facility for a follow-up visit.    She was recently hospitalized at [facility] in [place] from 02/17/2025 to 02/21/2025, where she was diagnosed with influenza A, bronchitis, and pneumonia. She was treated with Tamiflu, Rocephin, and a steroid taper. At the time of discharge, she required 4 L of nasal cannula oxygen. Since then, her respiratory function has improved, and her mental status has also been improving with the reduction of oversedating medications. She does not have follow-up labs since hospitalization.    On exam today, she was sitting up comfortably in her wheelchair, awake and alert but confused, and unable to provide any detailed history. She could not recall having done therapy or what her medications were. She was noted to have some lower extremity edema, which suggests volume overload. She has gained about 7 pounds since 01/14/2025. She usually has about +2 edema in her legs. She is on Lasix 40 mg once a day.    After evaluation with the nursing staff, she did have some rashes under the breast consistent with a yeast infection. Also, the right breast had a sore consistent with a stage 2 pressure wound.           PAST MEDICAL & SURGICAL HISTORY:   Past Medical History:   Diagnosis Date    Acute bronchitis     Acute otitis media     Anxiety     Atrial fibrillation     Body piercing     EARS    CAD (coronary artery disease)     Cardiac  insufficiency     Cataract     Has implants    Chest pain     Colitis     COPD (chronic obstructive pulmonary disease)     Dementia     PATIENT REPORTS VERY EARLY STAGES OF THIS.  REPORTS CURRENTLY LIVES BY HERSELF.    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 BUT DOES NOT WEAR THEM ANYMORE    Hepatitis     REPORTS AS A CHILD, UNSURE TYPE    History of esophageal reflux     History of medical problems     Dementia    History of obesity     Hyperlipidemia     Hypertension     Iron deficiency anemia     Moderate Alzheimer's dementia without behavioral disturbance, psychotic disturbance, mood disturbance, or anxiety     Obesity      TRUNKAL    On home oxygen therapy     REPORTS SHE WEARS AT [facility]L [facility]    Otitis externa     PAF (paroxysmal atrial fibrillation)     Peptic ulcer disease     noted on EGD, November 2013    Pneumonia     Pulmonary embolus 01/01/2012    RLS (restless legs syndrome)     Vitamin B 12 deficiency     Vitamin D deficiency     Wears glasses     Wears partial dentures     FULL UPPER PLATE AND PARTIAL LOWER PLATE      Past Surgical History:   Procedure Laterality Date    BREAST EXCISIONAL BIOPSY Bilateral     Patient unsure of dates-     BREAST SURGERY Bilateral     lumpectomies, PATIENT REPORTS BENIGN AND THAT THERE ARE NO RESTRICTIONS ON BUE'S    CATARACT EXTRACTION Bilateral     COLONOSCOPY N/A 09/27/2017    Procedure: COLONOSCOPY WITH COLD BIOPSY POLYPECTOMY; BIOPSIES;  Surgeon: Severiano Calloway MD;  Location: [facility] ENDOSCOPY;  Service:     CORONARY ANGIOPLASTY  10/01/2012    Nonobstructive coronary artery disease with normal left ventricular function     HYSTERECTOMY      OOPHORECTOMY      TOTAL ABDOMINAL HYSTERECTOMY WITH SALPINGO OOPHORECTOMY           MEDICATIONS:  I have reviewed and reconciled the patients  "medication list in the patients chart at the skilled nursing facility today, 2025.      ALLERGIES:  Allergies   Allergen Reactions    Lisinopril Cough    Other Rash     Contact Metal Agent         SOCIAL HISTORY:  Social History     Socioeconomic History    Marital status:    Tobacco Use    Smoking status: Former     Current packs/day: 0.00     Average packs/day: 3.0 packs/day for 35.0 years (105.0 ttl pk-yrs)     Types: Cigarettes     Start date: 1970     Quit date: 2005     Years since quittin.2     Passive exposure: Past    Smokeless tobacco: Never   Vaping Use    Vaping status: Never Used   Substance and Sexual Activity    Alcohol use: Not Currently    Drug use: No    Sexual activity: Not Currently     Partners: Male     Birth control/protection: Abstinence, Hysterectomy       FAMILY HISTORY:  Family History   Problem Relation Age of Onset    Heart attack Mother     Stroke Mother     Arthritis Mother         Crippling arthritis    Hyperlipidemia Mother     Heart attack Father     Stroke Father     Alcohol abuse Father     Hyperlipidemia Father     Heart attack Brother     Hearing loss Brother     Liver disease Sister     Breast cancer Neg Hx     Ovarian cancer Neg Hx     Colon cancer Neg Hx        REVIEW OF SYSTEMS:  Review of Systems   Unable to perform ROS: Dementia        PHYSICAL EXAMINATION:     VITAL SIGNS:  /67   Pulse 70   Temp 98 °F (36.7 °C)   Resp 14   Ht 172.7 cm (68\")   Wt 107 kg (235 lb)   SpO2 91%   BMI 35.73 kg/m²     Physical Exam  Vitals and nursing note reviewed.   Constitutional:       Appearance: Normal appearance. She is well-developed. She is obese.   HENT:      Head: Normocephalic and atraumatic.      Nose: Nose normal.      Mouth/Throat:      Mouth: Mucous membranes are moist.      Pharynx: No oropharyngeal exudate.   Eyes:      General: No scleral icterus.     Conjunctiva/sclera: Conjunctivae normal.      Pupils: Pupils are equal, round, and " reactive to light.   Neck:      Thyroid: No thyromegaly.   Cardiovascular:      Rate and Rhythm: Normal rate and regular rhythm.      Heart sounds: Normal heart sounds. No murmur heard.     No friction rub. No gallop.   Pulmonary:      Effort: Pulmonary effort is normal. No respiratory distress.      Breath sounds: Normal breath sounds. Wheezing (mild) present.   Abdominal:      General: Bowel sounds are normal. There is no distension.      Palpations: Abdomen is soft.      Tenderness: There is no abdominal tenderness.   Musculoskeletal:         General: No deformity or signs of injury.      Cervical back: Normal range of motion and neck supple.   Lymphadenopathy:      Cervical: No cervical adenopathy.   Skin:     General: Skin is warm and dry.      Findings: No rash.   Neurological:      Mental Status: She is alert and oriented to person, place, and time.   Psychiatric:      Comments: sleepy       RECORDS REVIEW:   Discharge summary [facility] 2/21/2025  Results      ASSESSMENT     Diagnoses and all orders for this visit:    1. Moderate late onset Alzheimer's dementia with other behavioral disturbance (Primary)    2. Paroxysmal atrial fibrillation    3. Primary hypertension    4. Obstructive sleep apnea syndrome    5. Impacted cerumen, right ear    6. Chronic daily headache    7. Other emphysema    8. Candidiasis of breast    9. Influenza A    10. Pneumonia due to infectious organism, unspecified laterality, unspecified part of lung    11. Acute on chronic diastolic CHF (congestive heart failure)        Assessment & Plan       Influenza A with bronchitis and pneumonia.  She was recently hospitalized and treated with Tamiflu, Rocephin, and a steroid taper. Her respiratory function has improved, and she is no longer requiring 4 L of nasal cannula oxygen.    Lower extremity edema / Acute on chronic diastolic congestive heart failure.   She has gained about 7 pounds since 01/14/2025 and exhibits lower  extremity edema, suggesting volume overload. A CBC and CMP will be ordered to monitor renal function. She will be started on Lasix 40 mg orally twice a day for 5 days, then return to daily after 5 days. Lower extremity compression socks will be used daily.     Candidal Yeast infection.  She has rashes under the breast consistent with a yeast infection. Diflucan 100 mg orally daily for 5 days will be started. Pillowcases or cloths will be used as barriers to help with ventilation under the breasts.    Stage 2 pressure wound.  She has a sore on the right breast consistent with a stage 2 pressure wound.  Wound care to start following in nursing facility,     Alzheimer's dementia.  Continue Namenda 10 mg twice daily and donepezil 10 mg nightly.     Mood disorder and anxiety.  Continue duloxetine 60 mg daily, Remeron 15 mg nightly, Seroquel 25 mg nightly, and lorazepam 1 mg daily as needed.     Gastroesophageal reflux disease (GERD).  Continue pantoprazole 40 mg daily.     Atrial fibrillation.  Continue sotalol and Eliquis.      Hypertension.  Continue amlodipine 10 mg daily and losartan daily.     Hyperlipidemia.  Continue atorvastatin 40 mg daily.     Type 2 diabetes mellitus.  Continue metformin 500 mg twice daily.      53 min spent with direct patient care, review of medical chart, discussion with nursing staff, and medical decision making.       [x]  Discussed Patient in detail with nursing/staff, addressed all needs today.     [x]  Plan of Care Reviewed   []  PT/OT Reviewed   [x]  Order Changes  []  Discharge Plans Reviewed  [x]  Advance Directive on file with Nursing Home.   [x]  POA on file with Nursing Home.    [x]  Code Status listed and reviewed.     I confirm accuracy of unchanged data/findings including physical exam and plan which have been carried forward from previous visit, as well as I have updated appropriately those that have changed        Que Johnson DO.  3/24/2025      Patient or patient  representative verbalized consent for the use of Ambient Listening during the visit with  Que Johnson DO for chart documentation. 3/24/2025  14:19 EDT

## 2025-03-18 NOTE — LETTER
Nursing Home Progress Note        Que Johnson DO   [address] [place] [address] Phone: [phone]  Fax: [phone]     PATIENT NAME: Nery Garcia                                                                          YOB: 1950           DATE OF SERVICE: 03/18/2025  FACILITY:  [facility]   ______________________________________________________________________     CHIEF COMPLAINT:  Readmission to nursing facility    History of Present Illness  The patient is a 74-year-old female being seen in the nursing facility for a follow-up visit.    She was recently hospitalized at [facility] in [place] from 02/17/2025 to 02/21/2025, where she was diagnosed with influenza A, bronchitis, and pneumonia. She was treated with Tamiflu, Rocephin, and a steroid taper. At the time of discharge, she required 4 L of nasal cannula oxygen. Since then, her respiratory function has improved, and her mental status has also been improving with the reduction of oversedating medications. She does not have follow-up labs since hospitalization.    On exam today, she was sitting up comfortably in her wheelchair, awake and alert but confused, and unable to provide any detailed history. She could not recall having done therapy or what her medications were. She was noted to have some lower extremity edema, which suggests volume overload. She has gained about 7 pounds since 01/14/2025. She usually has about +2 edema in her legs. She is on Lasix 40 mg once a day.    After evaluation with the nursing staff, she did have some rashes under the breast consistent with a yeast infection. Also, the right breast had a sore consistent with a stage 2 pressure wound.           PAST MEDICAL & SURGICAL HISTORY:   Past Medical History:   Diagnosis Date   • Acute bronchitis    • Acute otitis media    • Anxiety    • Atrial fibrillation    • Body piercing     EARS   • CAD (coronary artery disease)    • Cardiac  insufficiency    • Cataract     Has implants   • Chest pain    • Colitis    • COPD (chronic obstructive pulmonary disease)    • Dementia     PATIENT REPORTS VERY EARLY STAGES OF THIS.  REPORTS CURRENTLY LIVES BY HERSELF.   • Dementia    • Depression    • Dyslipidemia    • Fatty liver    • Fatty liver    • H/O cardiovascular stress test     PATIENT REPORTS UNSURE WHEN DONE BUT THAT ALL WAS WNL'S    • Hearing loss in right ear     REPORTS HAS HEARING AIDS BUT DOES NOT WEAR THEM ANYMORE   • Hepatitis     REPORTS AS A CHILD, UNSURE TYPE   • History of esophageal reflux    • History of medical problems     Dementia   • History of obesity    • Hyperlipidemia    • Hypertension    • Iron deficiency anemia    • Moderate Alzheimer's dementia without behavioral disturbance, psychotic disturbance, mood disturbance, or anxiety    • Obesity      TRUNKAL   • On home oxygen therapy     REPORTS SHE WEARS AT 1L [facility]   • Otitis externa    • PAF (paroxysmal atrial fibrillation)    • Peptic ulcer disease     noted on EGD, November 2013   • Pneumonia    • Pulmonary embolus 01/01/2012   • RLS (restless legs syndrome)    • Vitamin B 12 deficiency    • Vitamin D deficiency    • Wears glasses    • Wears partial dentures     FULL UPPER PLATE AND PARTIAL LOWER PLATE      Past Surgical History:   Procedure Laterality Date   • BREAST EXCISIONAL BIOPSY Bilateral     Patient unsure of dates-    • BREAST SURGERY Bilateral     lumpectomies, PATIENT REPORTS BENIGN AND THAT THERE ARE NO RESTRICTIONS ON BUE'S   • CATARACT EXTRACTION Bilateral    • COLONOSCOPY N/A 09/27/2017    Procedure: COLONOSCOPY WITH COLD BIOPSY POLYPECTOMY; BIOPSIES;  Surgeon: Severiano Calloway MD;  Location: [facility] ENDOSCOPY;  Service:    • CORONARY ANGIOPLASTY  10/01/2012    Nonobstructive coronary artery disease with normal left ventricular function    • HYSTERECTOMY     • OOPHORECTOMY     • TOTAL ABDOMINAL HYSTERECTOMY WITH SALPINGO OOPHORECTOMY           MEDICATIONS:  I have  "reviewed and reconciled the patients medication list in the patients chart at the skilled nursing facility today, 2025.      ALLERGIES:  Allergies   Allergen Reactions   • Lisinopril Cough   • Other Rash     Contact Metal Agent         SOCIAL HISTORY:  Social History     Socioeconomic History   • Marital status:    Tobacco Use   • Smoking status: Former     Current packs/day: 0.00     Average packs/day: 3.0 packs/day for 35.0 years (105.0 ttl pk-yrs)     Types: Cigarettes     Start date: 1970     Quit date: 2005     Years since quittin.2     Passive exposure: Past   • Smokeless tobacco: Never   Vaping Use   • Vaping status: Never Used   Substance and Sexual Activity   • Alcohol use: Not Currently   • Drug use: No   • Sexual activity: Not Currently     Partners: Male     Birth control/protection: Abstinence, Hysterectomy       FAMILY HISTORY:  Family History   Problem Relation Age of Onset   • Heart attack Mother    • Stroke Mother    • Arthritis Mother         Crippling arthritis   • Hyperlipidemia Mother    • Heart attack Father    • Stroke Father    • Alcohol abuse Father    • Hyperlipidemia Father    • Heart attack Brother    • Hearing loss Brother    • Liver disease Sister    • Breast cancer Neg Hx    • Ovarian cancer Neg Hx    • Colon cancer Neg Hx        REVIEW OF SYSTEMS:  Review of Systems   Unable to perform ROS: Dementia        PHYSICAL EXAMINATION:     VITAL SIGNS:  /67   Pulse 70   Temp 98 °F (36.7 °C)   Resp 14   Ht 172.7 cm (68\")   Wt 107 kg (235 lb)   SpO2 91%   BMI 35.73 kg/m²     Physical Exam  Vitals and nursing note reviewed.   Constitutional:       Appearance: Normal appearance. She is well-developed. She is obese.   HENT:      Head: Normocephalic and atraumatic.      Nose: Nose normal.      Mouth/Throat:      Mouth: Mucous membranes are moist.      Pharynx: No oropharyngeal exudate.   Eyes:      General: No scleral icterus.     Conjunctiva/sclera: " Conjunctivae normal.      Pupils: Pupils are equal, round, and reactive to light.   Neck:      Thyroid: No thyromegaly.   Cardiovascular:      Rate and Rhythm: Normal rate and regular rhythm.      Heart sounds: Normal heart sounds. No murmur heard.     No friction rub. No gallop.   Pulmonary:      Effort: Pulmonary effort is normal. No respiratory distress.      Breath sounds: Normal breath sounds. Wheezing (mild) present.   Abdominal:      General: Bowel sounds are normal. There is no distension.      Palpations: Abdomen is soft.      Tenderness: There is no abdominal tenderness.   Musculoskeletal:         General: No deformity or signs of injury.      Cervical back: Normal range of motion and neck supple.   Lymphadenopathy:      Cervical: No cervical adenopathy.   Skin:     General: Skin is warm and dry.      Findings: No rash.   Neurological:      Mental Status: She is alert and oriented to person, place, and time.   Psychiatric:      Comments: sleepy       RECORDS REVIEW:   Discharge summary [facility] 2/21/2025  Results      ASSESSMENT     Diagnoses and all orders for this visit:    1. Moderate late onset Alzheimer's dementia with other behavioral disturbance (Primary)    2. Paroxysmal atrial fibrillation    3. Primary hypertension    4. Obstructive sleep apnea syndrome    5. Impacted cerumen, right ear    6. Chronic daily headache    7. Other emphysema    8. Candidiasis of breast    9. Influenza A    10. Pneumonia due to infectious organism, unspecified laterality, unspecified part of lung    11. Acute on chronic diastolic CHF (congestive heart failure)        Assessment & Plan       Influenza A with bronchitis and pneumonia.  She was recently hospitalized and treated with Tamiflu, Rocephin, and a steroid taper. Her respiratory function has improved, and she is no longer requiring 4 L of nasal cannula oxygen.    Lower extremity edema / Acute on chronic diastolic congestive heart failure.   She has  gained about 7 pounds since 01/14/2025 and exhibits lower extremity edema, suggesting volume overload. A CBC and CMP will be ordered to monitor renal function. She will be started on Lasix 40 mg orally twice a day for 5 days, then return to daily after 5 days. Lower extremity compression socks will be used daily.     Candidal Yeast infection.  She has rashes under the breast consistent with a yeast infection. Diflucan 100 mg orally daily for 5 days will be started. Pillowcases or cloths will be used as barriers to help with ventilation under the breasts.    Stage 2 pressure wound.  She has a sore on the right breast consistent with a stage 2 pressure wound.  Wound care to start following in nursing facility,     Alzheimer's dementia.  Continue Namenda 10 mg twice daily and donepezil 10 mg nightly.     Mood disorder and anxiety.  Continue duloxetine 60 mg daily, Remeron 15 mg nightly, Seroquel 25 mg nightly, and lorazepam 1 mg daily as needed.     Gastroesophageal reflux disease (GERD).  Continue pantoprazole 40 mg daily.     Atrial fibrillation.  Continue sotalol and Eliquis.      Hypertension.  Continue amlodipine 10 mg daily and losartan daily.     Hyperlipidemia.  Continue atorvastatin 40 mg daily.     Type 2 diabetes mellitus.  Continue metformin 500 mg twice daily.      53 min spent with direct patient care, review of medical chart, discussion with nursing staff, and medical decision making.       [x]  Discussed Patient in detail with nursing/staff, addressed all needs today.     [x]  Plan of Care Reviewed   []  PT/OT Reviewed   [x]  Order Changes  []  Discharge Plans Reviewed  [x]  Advance Directive on file with Nursing Home.   [x]  POA on file with Nursing Home.    [x]  Code Status listed and reviewed.     I confirm accuracy of unchanged data/findings including physical exam and plan which have been carried forward from previous visit, as well as I have updated appropriately those that have changed         Que Johnson DO.  3/24/2025      Patient or patient representative verbalized consent for the use of Ambient Listening during the visit with  Que Johnson DO for chart documentation. 3/24/2025  14:19 EDT

## 2025-03-26 DIAGNOSIS — J10.1 INFLUENZA A: ICD-10-CM

## 2025-03-26 DIAGNOSIS — F03.90 CHRONIC DEMENTIA: ICD-10-CM

## 2025-03-26 RX ORDER — LORAZEPAM 1 MG/1
1 TABLET ORAL 2 TIMES DAILY
Qty: 60 TABLET | Refills: 3 | Status: SHIPPED | OUTPATIENT
Start: 2025-03-26

## 2025-03-26 NOTE — TELEPHONE ENCOUNTER
VICTOR MANUEL REQUESTING MED REFILL FOR LORAZEPAM 1 MG.    DIRECTIONS: LORAZEPAM 1 MG 1 TAB PO BID.

## 2025-03-31 ENCOUNTER — NURSING HOME (OUTPATIENT)
Dept: FAMILY MEDICINE CLINIC | Facility: CLINIC | Age: 75
End: 2025-03-31
Payer: MEDICARE

## 2025-03-31 VITALS
BODY MASS INDEX: 34.86 KG/M2 | WEIGHT: 230 LBS | HEART RATE: 44 BPM | SYSTOLIC BLOOD PRESSURE: 140 MMHG | TEMPERATURE: 97.8 F | OXYGEN SATURATION: 93 % | HEIGHT: 68 IN | DIASTOLIC BLOOD PRESSURE: 58 MMHG | RESPIRATION RATE: 20 BRPM

## 2025-03-31 DIAGNOSIS — F02.B0 MODERATE ALZHEIMER'S DEMENTIA WITHOUT BEHAVIORAL DISTURBANCE, PSYCHOTIC DISTURBANCE, MOOD DISTURBANCE, OR ANXIETY, UNSPECIFIED TIMING OF DEMENTIA ONSET: ICD-10-CM

## 2025-03-31 DIAGNOSIS — R60.9 DEPENDENT EDEMA: ICD-10-CM

## 2025-03-31 DIAGNOSIS — R60.0 BILATERAL LOWER EXTREMITY EDEMA: ICD-10-CM

## 2025-03-31 DIAGNOSIS — Z74.09 IMPAIRED MOBILITY AND ADLS: ICD-10-CM

## 2025-03-31 DIAGNOSIS — Z78.9 IMPAIRED MOBILITY AND ADLS: ICD-10-CM

## 2025-03-31 DIAGNOSIS — G30.9 MODERATE ALZHEIMER'S DEMENTIA WITHOUT BEHAVIORAL DISTURBANCE, PSYCHOTIC DISTURBANCE, MOOD DISTURBANCE, OR ANXIETY, UNSPECIFIED TIMING OF DEMENTIA ONSET: ICD-10-CM

## 2025-04-03 NOTE — PROGRESS NOTES
Nursing Home Follow Up Note      Anibal Gonzalez DO []   SHEILA Gaytan [x]  [address] [place] [address]  Phone: [phone]  Fax: [phone] Libertad Perez MD []    Que Johnson DO []   [address] [place] [address]  Phone: [phone]  Fax: [phone]     PATIENT NAME: Nery Garcia                                                                          YOB: 1950           DATE OF SERVICE: 03/31/2025  FACILITY:  [][place]   [] [facility] [place]   [] [facility]   [x] [facility]   [] Other ______________________________________________________________________      CHIEF COMPLAINT:    Increased BLE edema for the past couple days      HISTORY OF PRESENT ILLNESS:     73 yo female beng seen today for increased LE edema for the past couple days. No edema elsewhere, no shortness of breath. She does sit up in wheelchair with legs dependent most of the day. Sitting up in wheelchair during visit, with legs dependent. No complaints or concerns today.     PAST MEDICAL & SURGICAL HISTORY:   Past Medical History:   Diagnosis Date    Acute bronchitis     Acute otitis media     Anxiety     Atrial fibrillation     Body piercing     EARS    CAD (coronary artery disease)     Cardiac insufficiency     Cataract     Has implants    Chest pain     Colitis     COPD (chronic obstructive pulmonary disease)     Dementia     PATIENT REPORTS VERY EARLY STAGES OF THIS.  REPORTS CURRENTLY LIVES BY HERSELF.    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 BUT DOES NOT WEAR THEM ANYMORE    Hepatitis     REPORTS AS A CHILD, UNSURE TYPE    History of esophageal reflux     History of medical problems     Dementia    History of obesity     Hyperlipidemia     Hypertension     Iron deficiency anemia     Moderate Alzheimer's  dementia without behavioral disturbance, psychotic disturbance, mood disturbance, or anxiety     Obesity      TRUNKAL    On home oxygen therapy     REPORTS SHE WEARS AT [facility]L [facility]    Otitis externa     PAF (paroxysmal atrial fibrillation)     Peptic ulcer disease     noted on EGD, 2013    Pneumonia     Pulmonary embolus 2012    RLS (restless legs syndrome)     Vitamin B 12 deficiency     Vitamin D deficiency     Wears glasses     Wears partial dentures     FULL UPPER PLATE AND PARTIAL LOWER PLATE      Past Surgical History:   Procedure Laterality Date    BREAST EXCISIONAL BIOPSY Bilateral     Patient unsure of dates-     BREAST SURGERY Bilateral     lumpectomies, PATIENT REPORTS BENIGN AND THAT THERE ARE NO RESTRICTIONS ON BUE'S    CATARACT EXTRACTION Bilateral     COLONOSCOPY N/A 2017    Procedure: COLONOSCOPY WITH COLD BIOPSY POLYPECTOMY; BIOPSIES;  Surgeon: Severiano Calloway MD;  Location: [facility] ENDOSCOPY;  Service:     CORONARY ANGIOPLASTY  10/01/2012    Nonobstructive coronary artery disease with normal left ventricular function     HYSTERECTOMY      OOPHORECTOMY      TOTAL ABDOMINAL HYSTERECTOMY WITH SALPINGO OOPHORECTOMY           MEDICATIONS:  I have reviewed and reconciled the patients medication list in the patients chart at the skilled nursing facility today.      ALLERGIES:    Allergies   Allergen Reactions    Lisinopril Cough    Other Rash     Contact Metal Agent         SOCIAL HISTORY:    Social History     Socioeconomic History    Marital status:    Tobacco Use    Smoking status: Former     Current packs/day: 0.00     Average packs/day: 3.0 packs/day for 35.0 years (105.0 ttl pk-yrs)     Types: Cigarettes     Start date: 1970     Quit date: 2005     Years since quittin.2     Passive exposure: Past    Smokeless tobacco: Never   Vaping Use    Vaping status: Never Used   Substance and Sexual Activity    Alcohol use: Not Currently    Drug use: No    Sexual activity:  "Not Currently     Partners: Male     Birth control/protection: Abstinence, Hysterectomy       FAMILY HISTORY:    Family History   Problem Relation Age of Onset    Heart attack Mother     Stroke Mother     Arthritis Mother         Crippling arthritis    Hyperlipidemia Mother     Heart attack Father     Stroke Father     Alcohol abuse Father     Hyperlipidemia Father     Heart attack Brother     Hearing loss Brother     Liver disease Sister     Breast cancer Neg Hx     Ovarian cancer Neg Hx     Colon cancer Neg Hx        REVIEW OF SYSTEMS:    Review of Systems   Reason unable to perform ROS: per nursing and patient.   Constitutional:  Negative for activity change, appetite change, fatigue, fever and unexpected weight loss.   HENT:  Negative for congestion, mouth sores, nosebleeds and sore throat.    Eyes:  Negative for discharge and redness.   Respiratory:  Negative for apnea, cough, chest tightness, shortness of breath and wheezing.    Gastrointestinal:  Negative for abdominal distention, abdominal pain, constipation, diarrhea, nausea and vomiting.   Musculoskeletal:  Negative for arthralgias, gait problem and myalgias.   Skin:  Negative for pallor, rash and bruise.   Neurological:  Positive for memory problem and confusion.   Psychiatric/Behavioral:  Negative for agitation, behavioral problems, dysphoric mood, sleep disturbance and depressed mood. The patient is not nervous/anxious.          PHYSICAL EXAMINATION:   VITAL SIGNS:   Vitals:    03/31/25 0951   BP: 140/58   Pulse: (!) 44   Resp: 20   Temp: 97.8 °F (36.6 °C)   SpO2: 93%   Weight: 104 kg (230 lb)   Height: 172.7 cm (68\")        Physical Exam  Vitals and nursing note reviewed.   Constitutional:       Appearance: She is well-developed.   HENT:      Head: Normocephalic.   Eyes:      Conjunctiva/sclera: Conjunctivae normal.   Cardiovascular:      Rate and Rhythm: Normal rate and regular rhythm.      Heart sounds: Normal heart sounds.   Pulmonary:      Effort: " Pulmonary effort is normal. No respiratory distress.      Breath sounds: Examination of the right-upper field reveals wheezing. Examination of the left-upper field reveals wheezing. Wheezing present. No decreased breath sounds or rales.   Abdominal:      General: Bowel sounds are normal. There is no distension.      Palpations: Abdomen is soft.      Tenderness: There is no abdominal tenderness.   Musculoskeletal:      Comments: NROM all major joints   Skin:     General: Skin is warm and dry.      Findings: No rash.   Neurological:      Mental Status: She is alert. Mental status is at baseline.   Psychiatric:         Mood and Affect: Mood and affect normal.         Behavior: Behavior normal.         Cognition and Memory: Cognition is impaired. Memory is impaired.         RECORDS REVIEW:     I have reviewed records in [facility] and [facility]    ASSESSMENT     Diagnoses and all orders for this visit:    1. Bilateral lower extremity edema    2. Dependent edema    3. Moderate Alzheimer's dementia without behavioral disturbance, psychotic disturbance, mood disturbance, or anxiety, unspecified timing of dementia onset    4. Impaired mobility and ADLs          PLAN    #) BLE edema  -Give Lasix 40 mg po bid x 3 days, at breakfast and dinner, then back to daily.   -TATYANA hose to BLE  -Encourage elevation of BLE    Nursing encouraged to keep me informed of any acute changes, lack of improvement, or any new concerning symptoms.     Continue supportive care for all ADLs.     Will follow up and continue to monitor.       [x]  Discussed Patient in detail with nursing/staff, addressed all needs today.     [x]  Plan of Care Reviewed   []  PT/OT Reviewed   [x]  Order Changes  []  Discharge Plans Reviewed  [x]  Advance Directive on file with Nursing Home.   [x]  POA on file with Nursing Home.   [x]  Code Status listed: []  Full Code   []  DNR       I spent 30 minutes caring for Novel on this date of service. This time includes time spent by me in  the following activities:preparing for the visit, obtaining and/or reviewing a separately obtained history, performing a medically appropriate examination and/or evaluation , counseling and educating the patient/family/caregiver, ordering medications, tests, or procedures, referring and communicating with other health care professionals , documenting information in the medical record, and care coordination    I confirm accuracy of unchanged data/findings which have been carried forward from previous visit, as well as I have updated appropriately those that have changed.     Gail Hoover, APRN.

## 2025-04-05 ENCOUNTER — HOSPITAL ENCOUNTER (EMERGENCY)
Facility: HOSPITAL | Age: 75
Discharge: HOME | End: 2025-04-05
Attending: EMERGENCY MEDICINE
Payer: MEDICARE

## 2025-04-05 ENCOUNTER — APPOINTMENT (OUTPATIENT)
Dept: CT IMAGING | Facility: HOSPITAL | Age: 75
End: 2025-04-05
Payer: MEDICARE

## 2025-04-05 ENCOUNTER — APPOINTMENT (OUTPATIENT)
Dept: GENERAL RADIOLOGY | Facility: HOSPITAL | Age: 75
End: 2025-04-05
Payer: MEDICARE

## 2025-04-05 VITALS
DIASTOLIC BLOOD PRESSURE: 71 MMHG | SYSTOLIC BLOOD PRESSURE: 152 MMHG | BODY MASS INDEX: 34.75 KG/M2 | WEIGHT: 229.28 LBS | OXYGEN SATURATION: 97 % | TEMPERATURE: 97.9 F | HEIGHT: 68 IN | HEART RATE: 61 BPM | RESPIRATION RATE: 18 BRPM

## 2025-04-05 DIAGNOSIS — J96.10 CHRONIC RESPIRATORY FAILURE, UNSPECIFIED WHETHER WITH HYPOXIA OR HYPERCAPNIA: ICD-10-CM

## 2025-04-05 DIAGNOSIS — R10.9 ABDOMINAL PAIN, UNSPECIFIED ABDOMINAL LOCATION: Primary | ICD-10-CM

## 2025-04-05 LAB
ALBUMIN SERPL-MCNC: 4 G/DL (ref 3.5–5.2)
ALBUMIN/GLOB SERPL: 1.3 G/DL
ALP SERPL-CCNC: 104 U/L (ref 39–117)
ALT SERPL W P-5'-P-CCNC: 13 U/L (ref 1–33)
ANION GAP SERPL CALCULATED.3IONS-SCNC: 10.6 MMOL/L (ref 5–15)
AST SERPL-CCNC: 17 U/L (ref 1–32)
BASOPHILS # BLD AUTO: 0.03 10*3/MM3 (ref 0–0.2)
BASOPHILS NFR BLD AUTO: 0.4 % (ref 0–1.5)
BILIRUB SERPL-MCNC: 0.4 MG/DL (ref 0–1.2)
BILIRUB UR QL STRIP: NEGATIVE
BUN SERPL-MCNC: 28 MG/DL (ref 8–23)
BUN/CREAT SERPL: 19 (ref 7–25)
CALCIUM SPEC-SCNC: 9.9 MG/DL (ref 8.6–10.5)
CHLORIDE SERPL-SCNC: 99 MMOL/L (ref 98–107)
CLARITY UR: ABNORMAL
CO2 SERPL-SCNC: 28.4 MMOL/L (ref 22–29)
COLOR UR: YELLOW
CREAT SERPL-MCNC: 1.47 MG/DL (ref 0.57–1)
D-LACTATE SERPL-SCNC: 1 MMOL/L (ref 0.5–2)
DEPRECATED RDW RBC AUTO: 44.5 FL (ref 37–54)
EGFRCR SERPLBLD CKD-EPI 2021: 37.5 ML/MIN/1.73
EOSINOPHIL # BLD AUTO: 0.17 10*3/MM3 (ref 0–0.4)
EOSINOPHIL NFR BLD AUTO: 2.4 % (ref 0.3–6.2)
ERYTHROCYTE [DISTWIDTH] IN BLOOD BY AUTOMATED COUNT: 14.3 % (ref 12.3–15.4)
GLOBULIN UR ELPH-MCNC: 3.1 GM/DL
GLUCOSE SERPL-MCNC: 156 MG/DL (ref 65–99)
GLUCOSE UR STRIP-MCNC: NEGATIVE MG/DL
HCT VFR BLD AUTO: 37.5 % (ref 34–46.6)
HGB BLD-MCNC: 11.8 G/DL (ref 12–15.9)
HGB UR QL STRIP.AUTO: NEGATIVE
IMM GRANULOCYTES # BLD AUTO: 0.02 10*3/MM3 (ref 0–0.05)
IMM GRANULOCYTES NFR BLD AUTO: 0.3 % (ref 0–0.5)
KETONES UR QL STRIP: NEGATIVE
LEUKOCYTE ESTERASE UR QL STRIP.AUTO: NEGATIVE
LIPASE SERPL-CCNC: 21 U/L (ref 13–60)
LYMPHOCYTES # BLD AUTO: 1.5 10*3/MM3 (ref 0.7–3.1)
LYMPHOCYTES NFR BLD AUTO: 21.6 % (ref 19.6–45.3)
MCH RBC QN AUTO: 26.6 PG (ref 26.6–33)
MCHC RBC AUTO-ENTMCNC: 31.5 G/DL (ref 31.5–35.7)
MCV RBC AUTO: 84.5 FL (ref 79–97)
MONOCYTES # BLD AUTO: 0.56 10*3/MM3 (ref 0.1–0.9)
MONOCYTES NFR BLD AUTO: 8 % (ref 5–12)
NEUTROPHILS NFR BLD AUTO: 4.68 10*3/MM3 (ref 1.7–7)
NEUTROPHILS NFR BLD AUTO: 67.3 % (ref 42.7–76)
NITRITE UR QL STRIP: NEGATIVE
NRBC BLD AUTO-RTO: 0 /100 WBC (ref 0–0.2)
PH UR STRIP.AUTO: 7.5 [PH] (ref 5–8)
PLATELET # BLD AUTO: 213 10*3/MM3 (ref 140–450)
PMV BLD AUTO: 10 FL (ref 6–12)
POTASSIUM SERPL-SCNC: 4.4 MMOL/L (ref 3.5–5.2)
PROT SERPL-MCNC: 7.1 G/DL (ref 6–8.5)
PROT UR QL STRIP: NEGATIVE
RBC # BLD AUTO: 4.44 10*6/MM3 (ref 3.77–5.28)
SODIUM SERPL-SCNC: 138 MMOL/L (ref 136–145)
SP GR UR STRIP: 1.01 (ref 1–1.03)
UROBILINOGEN UR QL STRIP: ABNORMAL
WBC NRBC COR # BLD AUTO: 6.96 10*3/MM3 (ref 3.4–10.8)

## 2025-04-05 PROCEDURE — 99285 EMERGENCY DEPT VISIT HI MDM: CPT | Performed by: EMERGENCY MEDICINE

## 2025-04-05 PROCEDURE — 80053 COMPREHEN METABOLIC PANEL: CPT | Performed by: EMERGENCY MEDICINE

## 2025-04-05 PROCEDURE — 85025 COMPLETE CBC W/AUTO DIFF WBC: CPT | Performed by: EMERGENCY MEDICINE

## 2025-04-05 PROCEDURE — 83605 ASSAY OF LACTIC ACID: CPT | Performed by: EMERGENCY MEDICINE

## 2025-04-05 PROCEDURE — 71045 X-RAY EXAM CHEST 1 VIEW: CPT

## 2025-04-05 PROCEDURE — 74177 CT ABD & PELVIS W/CONTRAST: CPT

## 2025-04-05 PROCEDURE — 83690 ASSAY OF LIPASE: CPT | Performed by: EMERGENCY MEDICINE

## 2025-04-05 PROCEDURE — 25510000001 IOPAMIDOL 61 % SOLUTION: Performed by: EMERGENCY MEDICINE

## 2025-04-05 PROCEDURE — 81003 URINALYSIS AUTO W/O SCOPE: CPT | Performed by: EMERGENCY MEDICINE

## 2025-04-05 RX ORDER — IOPAMIDOL 612 MG/ML
100 INJECTION, SOLUTION INTRAVASCULAR
Status: COMPLETED | OUTPATIENT
Start: 2025-04-05 | End: 2025-04-05

## 2025-04-05 RX ORDER — SODIUM CHLORIDE 0.9 % (FLUSH) 0.9 %
10 SYRINGE (ML) INJECTION AS NEEDED
Status: DISCONTINUED | OUTPATIENT
Start: 2025-04-05 | End: 2025-04-05 | Stop reason: HOSPADM

## 2025-04-05 RX ADMIN — IOPAMIDOL 100 ML: 612 INJECTION, SOLUTION INTRAVENOUS at 09:51

## 2025-04-05 NOTE — ED NOTES
[facility]. Awaiting EMS at this time.

## 2025-04-06 NOTE — ED PROVIDER NOTES
EMERGENCY DEPARTMENT ENCOUNTER    Pt Name: Nery Garcia  MRN: [id number]  Pt :   1950  Room Number:    Date of encounter:  2025  PCP: Que Johnson DO  ED Provider: Rolly Walker MD    Historian: EMS, family      HPI:  Chief Complaint   Patient presents with    Abdominal Pain    Shortness of Breath     Pt has hx of dementia, pt's O2 sats are a little low, mid 80's to 91 %. Pt is  also having low abd pain with decreased bowel sounds.           Context: Nery Garcia is a 74 y.o. female who presents to the ED c/o abdominal pain, patient's complaint of lower abdominal pain, history of similar pain in the past.  Also her oxygen levels have been running low, apparently she was previously on oxygen but when she was discharged back to the nursing facility the oxygen order was not placed.  Patient's saturations have been dropping.  She has had some increasing confusion when her oxygen level drops.  No vomiting, no blood in the stool      PAST MEDICAL HISTORY  Past Medical History:   Diagnosis Date    Acute bronchitis     Acute otitis media     Anxiety     Atrial fibrillation     Body piercing     EARS    CAD (coronary artery disease)     Cardiac insufficiency     Cataract     Has implants    Chest pain     Colitis     COPD (chronic obstructive pulmonary disease)     Dementia     PATIENT REPORTS VERY EARLY STAGES OF THIS.  REPORTS CURRENTLY LIVES BY HERSELF.    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 BUT DOES NOT WEAR THEM ANYMORE    Hepatitis     REPORTS AS A CHILD, UNSURE TYPE    History of esophageal reflux     History of medical problems     Dementia    History of obesity     Hyperlipidemia     Hypertension     Iron deficiency anemia     Moderate Alzheimer's dementia without behavioral disturbance,  psychotic disturbance, mood disturbance, or anxiety     Obesity      TRUNKAL    On home oxygen therapy     REPORTS SHE WEARS AT [facility]L [facility]    Otitis externa     PAF (paroxysmal atrial fibrillation)     Peptic ulcer disease     noted on EGD, 2013    Pneumonia     Pulmonary embolus 2012    RLS (restless legs syndrome)     Vitamin B 12 deficiency     Vitamin D deficiency     Wears glasses     Wears partial dentures     FULL UPPER PLATE AND PARTIAL LOWER PLATE         PAST SURGICAL HISTORY  Past Surgical History:   Procedure Laterality Date    BREAST EXCISIONAL BIOPSY Bilateral     Patient unsure of dates-     BREAST SURGERY Bilateral     lumpectomies, PATIENT REPORTS BENIGN AND THAT THERE ARE NO RESTRICTIONS ON BUE'S    CATARACT EXTRACTION Bilateral     COLONOSCOPY N/A 2017    Procedure: COLONOSCOPY WITH COLD BIOPSY POLYPECTOMY; BIOPSIES;  Surgeon: Severiano Calloway MD;  Location: [facility] ENDOSCOPY;  Service:     CORONARY ANGIOPLASTY  10/01/2012    Nonobstructive coronary artery disease with normal left ventricular function     HYSTERECTOMY      OOPHORECTOMY      TOTAL ABDOMINAL HYSTERECTOMY WITH SALPINGO OOPHORECTOMY           FAMILY HISTORY  Family History   Problem Relation Age of Onset    Heart attack Mother     Stroke Mother     Arthritis Mother         Crippling arthritis    Hyperlipidemia Mother     Heart attack Father     Stroke Father     Alcohol abuse Father     Hyperlipidemia Father     Heart attack Brother     Hearing loss Brother     Liver disease Sister     Breast cancer Neg Hx     Ovarian cancer Neg Hx     Colon cancer Neg Hx          SOCIAL HISTORY  Social History     Socioeconomic History    Marital status:    Tobacco Use    Smoking status: Former     Current packs/day: 0.00     Average packs/day: 3.0 packs/day for 35.0 years (105.0 ttl pk-yrs)     Types: Cigarettes     Start date: 1970     Quit date: 2005     Years since quittin.2     Passive exposure: Past     Smokeless tobacco: Never   Vaping Use    Vaping status: Never Used   Substance and Sexual Activity    Alcohol use: Not Currently    Drug use: No    Sexual activity: Not Currently     Partners: Male     Birth control/protection: Abstinence, Hysterectomy         ALLERGIES  Lisinopril and Other        REVIEW OF SYSTEMS  Review of Systems   Constitutional:  Negative for chills and fever.   HENT:  Negative for sore throat and trouble swallowing.    Eyes:  Negative for pain and redness.   Respiratory:  Negative for cough and shortness of breath.    Cardiovascular:  Negative for chest pain and leg swelling.   Gastrointestinal:  Positive for abdominal pain. Negative for nausea and vomiting.   Genitourinary:  Negative for dysuria and urgency.   Musculoskeletal:  Negative for back pain and neck pain.   Skin:  Negative for rash and wound.   Neurological:  Negative for dizziness and weakness.        All systems reviewed and negative except for those discussed in HPI.       PHYSICAL EXAM    I have reviewed the triage vital signs and nursing notes.    ED Triage Vitals [04/05/25 0856]   Temp Heart Rate Resp BP SpO2   97.9 °F (36.6 °C) 61 18 174/75 97 %      Temp src Heart Rate Source Patient Position BP Location FiO2 (%)   Oral Monitor Sitting Left arm --       Physical Exam  Constitutional:       Appearance: Normal appearance. She is not ill-appearing.   HENT:      Head: Normocephalic and atraumatic.      Right Ear: External ear normal.      Left Ear: External ear normal.      Nose: Nose normal.      Mouth/Throat:      Mouth: Mucous membranes are moist.      Pharynx: Oropharynx is clear.   Eyes:      Extraocular Movements: Extraocular movements intact.      Conjunctiva/sclera: Conjunctivae normal.      Pupils: Pupils are equal, round, and reactive to light.   Cardiovascular:      Rate and Rhythm: Normal rate and regular rhythm.      Pulses:           Radial pulses are 2+ on the right side and 2+ on the left side.   Pulmonary:       Effort: Pulmonary effort is normal.      Breath sounds: Normal breath sounds.   Abdominal:      General: There is no distension.      Palpations: Abdomen is soft.      Tenderness: There is no abdominal tenderness.   Musculoskeletal:         General: No tenderness or deformity. Normal range of motion.      Cervical back: Normal range of motion and neck supple.      Right lower leg: No edema.      Left lower leg: No edema.   Skin:     General: Skin is warm and dry.      Capillary Refill: Capillary refill takes less than 2 seconds.   Neurological:      General: No focal deficit present.      Mental Status: She is alert and oriented to person, place, and time.            LAB RESULTS  Recent Results (from the past 24 hours)   Comprehensive Metabolic Panel    Collection Time: 04/05/25  9:08 AM    Specimen: Blood   Result Value Ref Range    Glucose 156 (H) 65 - 99 mg/dL    BUN 28 (H) 8 - 23 mg/dL    Creatinine 1.47 (H) 0.57 - 1.00 mg/dL    Sodium 138 136 - 145 mmol/L    Potassium 4.4 3.5 - 5.2 mmol/L    Chloride 99 98 - 107 mmol/L    CO2 28.4 22.0 - 29.0 mmol/L    Calcium 9.9 8.6 - 10.5 mg/dL    Total Protein 7.1 6.0 - 8.5 g/dL    Albumin 4.0 3.5 - 5.2 g/dL    ALT (SGPT) 13 1 - 33 U/L    AST (SGOT) 17 1 - 32 U/L    Alkaline Phosphatase 104 39 - 117 U/L    Total Bilirubin 0.4 0.0 - 1.2 mg/dL    Globulin 3.1 gm/dL    A/G Ratio 1.3 g/dL    BUN/Creatinine Ratio 19.0 7.0 - 25.0    Anion Gap 10.6 5.0 - 15.0 mmol/L    eGFR 37.5 (L) >60.0 mL/min/1.73   Lactic Acid, Plasma    Collection Time: 04/05/25  9:08 AM    Specimen: Blood   Result Value Ref Range    Lactate 1.0 0.5 - 2.0 mmol/L   Lipase    Collection Time: 04/05/25  9:08 AM    Specimen: Blood   Result Value Ref Range    Lipase 21 13 - 60 U/L   CBC Auto Differential    Collection Time: 04/05/25  9:08 AM    Specimen: Blood   Result Value Ref Range    WBC 6.96 3.40 - 10.80 10*3/mm3    RBC 4.44 3.77 - 5.28 10*6/mm3    Hemoglobin 11.8 (L) 12.0 - 15.9 g/dL    Hematocrit 37.5 34.0  - 46.6 %    MCV 84.5 79.0 - 97.0 fL    MCH 26.6 26.6 - 33.0 pg    MCHC 31.5 31.5 - 35.7 g/dL    RDW 14.3 12.3 - 15.4 %    RDW-SD 44.5 37.0 - 54.0 fl    MPV 10.0 6.0 - 12.0 fL    Platelets 213 140 - 450 10*3/mm3    Neutrophil % 67.3 42.7 - 76.0 %    Lymphocyte % 21.6 19.6 - 45.3 %    Monocyte % 8.0 5.0 - 12.0 %    Eosinophil % 2.4 0.3 - 6.2 %    Basophil % 0.4 0.0 - 1.5 %    Immature Grans % 0.3 0.0 - 0.5 %    Neutrophils, Absolute 4.68 1.70 - 7.00 10*3/mm3    Lymphocytes, Absolute 1.50 0.70 - 3.10 10*3/mm3    Monocytes, Absolute 0.56 0.10 - 0.90 10*3/mm3    Eosinophils, Absolute 0.17 0.00 - 0.40 10*3/mm3    Basophils, Absolute 0.03 0.00 - 0.20 10*3/mm3    Immature Grans, Absolute 0.02 0.00 - 0.05 10*3/mm3    nRBC 0.0 0.0 - 0.2 /100 WBC   Urinalysis With Microscopic If Indicated (No Culture) - Urine, Clean Catch    Collection Time: 04/05/25 10:49 AM    Specimen: Urine, Clean Catch   Result Value Ref Range    Color, UA Yellow Yellow, Straw    Appearance, UA Cloudy (A) Clear    pH, UA 7.5 5.0 - 8.0    Specific Gravity, UA 1.009 1.005 - 1.030    Glucose, UA Negative Negative    Ketones, UA Negative Negative    Bilirubin, UA Negative Negative    Blood, UA Negative Negative    Protein, UA Negative Negative    Leuk Esterase, UA Negative Negative    Nitrite, UA Negative Negative    Urobilinogen, UA 0.2 E.U./dL 0.2 - 1.0 E.U./dL       If labs were ordered, I independently reviewed the results and considered them in treating the patient.        RADIOLOGY  XR Chest 1 View  Result Date: 4/5/2025  CHEST SINGLE VIEW  COMPARISON: Chest from 02/17/2025.  HISTORY: Decreased oxygen saturation.  FINDINGS: Cardiac silhouette is mildly enlarged.  Mild interstitial opacities seen throughout both lungs, likely chronic.  Linear opacity in the right upper lobe is new, but is favored to represent atelectasis.      Linear density in right upper lobe, new since previous. Follow-up PA and lateral chest radiograph recommended.  This report was  signed and finalized on 4/5/2025 12:42 PM by Juanjose Pelayo MD.      CT Abdomen Pelvis With Contrast  Result Date: 4/5/2025  CT SCAN OF THE ABDOMEN AND PELVIS WITH CONTRAST  COMPARISON: 7-  HISTORY: Bilateral lower abdominal pain  PROCEDURE: The patient was injected with 100 mL of Omnipaque-300. Oral contrast was also administered. Axial images were obtained from the lung bases to the pubic symphysis by computed tomography. Individualized dose reduction techniques using automated exposure control or adjustment of the mA and/or kV according to patient size were employed.  FINDINGS:  ABDOMEN: Mild scarring is seen at the lung bases.  Liver parenchyma is homogeneous. Gallbladder is present. Spleen, pancreas, adrenals, and kidneys appear unremarkable. Benign-appearing cyst arising from the inferior pole of the left kidney measures approximately 2.4 cm. There is dense vascular calcification within the abdominal aorta and at the origins of the iliac vessels.  PELVIS: Multiple scattered diverticula are identified throughout the descending and sigmoid colon. No localized inflammatory reaction is seen. There is no evidence of pelvic free fluid.       1. Descending and sigmoid diverticulosis, without evidence of diverticulitis.  2. Extensive vascular calcification in the distal abdominal aorta and iliac vessels, as described.    CTDI: 14.62 mGy DLP:747.28 mGy.cm   This report was signed and finalized on 4/5/2025 10:03 AM by Juanjose Pelayo MD.            PROCEDURES    Procedures    Interpretations    O2 Sat: The patient's oxygen saturation was 97% on  2 L Nasal Cannula.  This was independently interpreted by me as Normal        Radiology: I ordered and independently reviewed the above noted radiographic studies.  I viewed images of CT Abdomen/Pelvis which showed No intraabdominal process per my independent interpretation. See radiologist's dictation for official interpretation.         MEDICATIONS GIVEN IN  ER    Medications   iopamidol (ISOVUE-300) 61 % injection 100 mL (100 mL Intravenous Given 4/5/25 0951)         MEDICAL DECISION MAKING, PROGRESS, and CONSULTS    All labs, if obtained, have been independently reviewed by me.  All radiology studies, if obtained, have been reviewed by me and the radiologist dictating the report.  All EKG's, if obtained, have been independently viewed and interpreted by me      Discussion below represents my analysis of pertinent findings related to patient's condition, differential diagnosis, treatment plan and final disposition.      Differential diagnosis:    74-year-old female presented ED with complaint of abdominal pain, lower in the abdomen, concern for diverticulitis, versus constipation, versus bowel obstruction, obtain CT scan of the abdomen pelvis.  Apparently has had some low oxygen with the patient has been on 2 L of oxygen for many years who currently has been without it, on 2 L she is saturating normally here.  Will obtain labs, chest x-ray    Additional Sources:  Discussed/ obtained information from independent historians:  Daughter at bedside  External (non-ED) record review:  Discharge summary 2/21/2025 after admission for shortness of breath and influenza       Orders placed during this visit:  Orders Placed This Encounter   Procedures    Oxygen Therapy    CT Abdomen Pelvis With Contrast    XR Chest 1 View    Comprehensive Metabolic Panel    Urinalysis With Microscopic If Indicated (No Culture) - Urine, Clean Catch    Lactic Acid, Plasma    Lipase    CBC Auto Differential    CBC & Differential         Additional orders considered but not ordered:  None    ED Course:    Consultants:  None    ED Course as of 04/06/25 0704   Sat Apr 05, 2025   0924 CBC & Differential(!)  CBC is unremarkable [CS]   1028 Comprehensive Metabolic Panel(!)  CMP with stable elevated creatinine [CS]   1028 Lactic Acid, Plasma  Lactic is negative [CS]   1058 Urinalysis With Microscopic If  Indicated (No Culture) - Urine, Clean Catch(!)  UA negative for infection [CS]   1121 Labs are benign, CT scan is benign, the patient is resting comfortably, her abdomen is nontender on reexamination.  Discussed with the daughter at bedside I do not see a life-threatening cause of her abdominal pain, the daughter notes that the patient was discharged from here recently, and was not placed on oxygen at her long-term care facility which likely explains some of her symptoms.  Will write for patient to have 2 L of oxygen at all times.  Patient will be discharged back to her long-term care facility.  Her and daughter voiced understanding and are agreeable with the plan for discharge home [CS]      ED Course User Index  [CS] Rolly Walker MD           After my consideration of clinical presentation and any laboratory/radiology studies obtained, I discussed the findings with the patient/patient representative who is in agreement with the treatment plan and the final disposition. Risks and benefits of discharge were discussed.     AS OF 07:04 EDT VITALS:    BP - 152/71  HR - 61  TEMP - 97.9 °F (36.6 °C) (Oral)  O2 SATS - 97%    I reviewed the patient's prescription monitoring report if available prior to discharge    DIAGNOSIS  Final diagnoses:   Abdominal pain, unspecified abdominal location   Chronic respiratory failure, unspecified whether with hypoxia or hypercapnia         DISPOSITION  ED Disposition       ED Disposition   Discharge    Condition   Stable    Comment   --                   Please note that portions of this document were completed with voice recognition software.        Rolly Walker MD  04/06/25 9178

## 2025-04-14 ENCOUNTER — TELEPHONE (OUTPATIENT)
Dept: INTERNAL MEDICINE | Facility: CLINIC | Age: 75
End: 2025-04-14
Payer: MEDICARE

## (undated) DEVICE — ENDOGATOR AUXILIARY WATER JET CONNECTOR: Brand: ENDOGATOR

## (undated) DEVICE — FRCP BIOP COLD ENDOJAW ALLGTR W/NDL 2.8X2300MM BLU

## (undated) DEVICE — JELLY,LUBE,STERILE,FLIP TOP,TUBE,2-OZ: Brand: MEDLINE

## (undated) DEVICE — Device

## (undated) DEVICE — PAD GRND REM POLYHESIVE A/ DISP